# Patient Record
Sex: FEMALE | Race: WHITE | NOT HISPANIC OR LATINO | Employment: OTHER | URBAN - METROPOLITAN AREA
[De-identification: names, ages, dates, MRNs, and addresses within clinical notes are randomized per-mention and may not be internally consistent; named-entity substitution may affect disease eponyms.]

---

## 2017-01-11 PROCEDURE — 96523 IRRIG DRUG DELIVERY DEVICE: CPT

## 2017-01-11 PROCEDURE — 25000125 ZZHC RX 250: Performed by: INTERNAL MEDICINE

## 2017-01-11 RX ORDER — HEPARIN SODIUM (PORCINE) LOCK FLUSH IV SOLN 100 UNIT/ML 100 UNIT/ML
5 SOLUTION INTRAVENOUS EVERY 8 HOURS
Status: DISCONTINUED | OUTPATIENT
Start: 2017-01-11 | End: 2017-01-19 | Stop reason: HOSPADM

## 2017-01-11 RX ADMIN — SODIUM CHLORIDE, PRESERVATIVE FREE 5 ML: 5 INJECTION INTRAVENOUS at 11:14

## 2017-03-20 PROCEDURE — 96523 IRRIG DRUG DELIVERY DEVICE: CPT

## 2017-03-20 PROCEDURE — 25000128 H RX IP 250 OP 636: Performed by: INTERNAL MEDICINE

## 2017-03-20 RX ORDER — HEPARIN SODIUM (PORCINE) LOCK FLUSH IV SOLN 100 UNIT/ML 100 UNIT/ML
5 SOLUTION INTRAVENOUS DAILY PRN
Status: DISCONTINUED | OUTPATIENT
Start: 2017-03-20 | End: 2017-03-28 | Stop reason: HOSPADM

## 2017-03-20 RX ADMIN — SODIUM CHLORIDE, PRESERVATIVE FREE 5 ML: 5 INJECTION INTRAVENOUS at 10:43

## 2017-03-20 NOTE — NURSING NOTE
Chief Complaint   Patient presents with     Port Flush     port accessed by RN, positive blood return line flushed with NS and heparin and de-accessed immediately.      Melissa Hanley

## 2017-03-26 ASSESSMENT — ENCOUNTER SYMPTOMS
ABDOMINAL PAIN: 0
HEARTBURN: 1
JAUNDICE: 0
RECTAL PAIN: 0
VOMITING: 0
NAUSEA: 0
ARTHRALGIAS: 1
MYALGIAS: 0
DYSURIA: 1
EYE WATERING: 1
BOWEL INCONTINENCE: 0
DIFFICULTY URINATING: 0
MUSCLE CRAMPS: 1
JOINT SWELLING: 0
FLANK PAIN: 0
STIFFNESS: 0
LEG PAIN: 1
BACK PAIN: 1
BLOOD IN STOOL: 0
HEMATURIA: 0
BLOATING: 1
CONSTIPATION: 0
EYE IRRITATION: 1
NECK PAIN: 0
RECTAL BLEEDING: 0
DIARRHEA: 1
MUSCLE WEAKNESS: 0

## 2017-03-27 DIAGNOSIS — C48.0 LIPOSARCOMA OF RETROPERITONEUM (H): ICD-10-CM

## 2017-03-27 RX ORDER — METHYLPREDNISOLONE 32 MG/1
32 TABLET ORAL DAILY
Qty: 2 TABLET | Refills: 3 | Status: SHIPPED | OUTPATIENT
Start: 2017-03-27 | End: 2018-06-20

## 2017-03-29 ENCOUNTER — ONCOLOGY VISIT (OUTPATIENT)
Dept: ONCOLOGY | Facility: CLINIC | Age: 62
End: 2017-03-29
Attending: INTERNAL MEDICINE
Payer: COMMERCIAL

## 2017-03-29 VITALS
BODY MASS INDEX: 26.23 KG/M2 | SYSTOLIC BLOOD PRESSURE: 111 MMHG | HEIGHT: 69 IN | RESPIRATION RATE: 18 BRPM | TEMPERATURE: 98.1 F | DIASTOLIC BLOOD PRESSURE: 75 MMHG | HEART RATE: 63 BPM | WEIGHT: 177.1 LBS | OXYGEN SATURATION: 98 %

## 2017-03-29 DIAGNOSIS — C48.0 LIPOSARCOMA OF RETROPERITONEUM (H): ICD-10-CM

## 2017-03-29 DIAGNOSIS — E03.9 HYPOTHYROIDISM, UNSPECIFIED TYPE: ICD-10-CM

## 2017-03-29 DIAGNOSIS — D68.61 ANTIPHOSPHOLIPID ANTIBODY SYNDROME (H): ICD-10-CM

## 2017-03-29 DIAGNOSIS — M51.26 DISPLACEMENT OF LUMBAR INTERVERTEBRAL DISC WITHOUT MYELOPATHY: ICD-10-CM

## 2017-03-29 DIAGNOSIS — Z86.19 HISTORY OF HERPES GENITALIS: ICD-10-CM

## 2017-03-29 DIAGNOSIS — K21.9 GASTROESOPHAGEAL REFLUX DISEASE, ESOPHAGITIS PRESENCE NOT SPECIFIED: ICD-10-CM

## 2017-03-29 PROCEDURE — 99214 OFFICE O/P EST MOD 30 MIN: CPT | Mod: ZP | Performed by: INTERNAL MEDICINE

## 2017-03-29 PROCEDURE — 99212 OFFICE O/P EST SF 10 MIN: CPT | Mod: ZF

## 2017-03-29 RX ORDER — ASPIRIN 81 MG/1
81 TABLET, CHEWABLE ORAL DAILY
COMMUNITY
End: 2018-11-29

## 2017-03-29 RX ORDER — LEVOTHYROXINE SODIUM 75 UG/1
88 TABLET ORAL DAILY
COMMUNITY
End: 2018-04-20

## 2017-03-29 ASSESSMENT — PAIN SCALES - GENERAL: PAINLEVEL: NO PAIN (0)

## 2017-03-29 NOTE — MR AVS SNAPSHOT
After Visit Summary   3/29/2017    Shiela Hewitt    MRN: [id number]           Patient Information     Date Of Birth          1955        Visit Information        Provider Department      3/29/2017 11:00 AM Rob Wiseman MD [facility]        Today's Diagnoses     Liposarcoma of retroperitoneum (H)        Hypothyroidism, unspecified type        Antiphospholipid antibody syndrome (H)        Gastroesophageal reflux disease, esophagitis presence not specified        History of herpes genitalis        Displacement of lumbar intervertebral disc without myelopathy           Follow-ups after your visit        Your next 10 appointments already scheduled     Jun 27, 2017  9:40 AM CDT   (Arrive by 9:25 AM)   CT CHEST ABDOMEN PELVIS W/O & W CONTRAST with UCCT1   [facility] [place] [facility] ([facility])    [address] [address] [phone]           Please bring any scans or X-rays taken at other hospitals, if similar tests were done. Also bring a list of your medicines, including vitamins, minerals and over-the-counter drugs. It is safest to leave personal items at home.  Be sure to tell your doctor:   If you have any allergies.   If there s any chance you are pregnant.   If you are breastfeeding.   If you have any special needs.  You may have contrast for this exam. To prepare:   Do not eat or drink for 2 hours before your exam. If you need to take medicine, you may take it with small sips of water. (We may ask you to take liquid medicine as well.)   The day before your exam, drink extra fluids at least six 8-ounce glasses (unless your doctor tells you to restrict your fluids).  Patients over 70 or patients with diabetes or kidney problems:   If you haven t had a blood test (creatinine test) within the last 30 days, go to your clinic or Diagnostic Imaging Department for this test.  If you have diabetes:   If your  kidney function is normal, continue taking your metformin (Avandamet, Glucophage, Glucovance, Metaglip) on the day of your exam.   If your kidney function is abnormal, wait 48 hours before restarting this medicine.  You will have oral contrast for this exam:   You will drink the contrast at home. Get this from your clinic or Diagnostic Imaging Department. Please follow the directions given.  Please wear loose clothing, such as a sweat suit or jogging clothes. Avoid snaps, zippers and other metal. We may ask you to undress and put on a hospital gown.  If you have any questions, please call the Imaging Department where you will have your exam.            Jun 28, 2017 11:30 AM CDT   (Arrive by 11:15 AM)   Return Visit with Rob Wiseman MD   [facility] ([facility])    [address] [place] [id number] [phone]            Oct 18, 2017 11:30 AM CDT   (Arrive by 11:15 AM)   Return Visit with Rob Wiseman MD   [facility] ([facility])    [address] [place] [id number] [phone]              Future tests that were ordered for you today     Open Future Orders        Priority Expected Expires Ordered    CT Chest Abdomen Pelvis w/o & w Contrast Routine 6/27/2017 6/27/2022 3/29/2017            Who to contact     If you have questions or need follow up information about today's clinic visit or your schedule please contact [facility] directly at [phone].  Normal or non-critical lab and imaging results will be communicated to you by MyChart, letter or phone within 4 business days after the clinic has received the results. If you do not hear from us within 7 days, please contact the clinic through MyChart or phone. If you have a critical or abnormal lab result, we will notify you by phone as soon as possible.  Submit refill requests  "through [employer] or call your pharmacy and they will forward the refill request to us. Please allow 3 business days for your refill to be completed.          Additional Information About Your Visit        [employer]art Information     [employer] gives you secure access to your electronic health record. If you see a primary care provider, you can also send messages to your care team and make appointments. If you have questions, please call your primary care clinic.  If you do not have a primary care provider, please call [phone] and they will assist you.        Care EveryWhere ID     This is your Care EveryWhere ID. This could be used by other organizations to access your [place] medical records  [id number]        Your Vitals Were     Pulse Temperature Respirations Height Pulse Oximetry BMI (Body Mass Index)    63 98.1  F (36.7  C) (Oral) 18 1.74 m (5' 8.5\") 98% 26.54 kg/m2       Blood Pressure from Last 3 Encounters:   03/29/17 111/75   12/14/16 115/72   08/30/16 114/74    Weight from Last 3 Encounters:   03/29/17 80.3 kg (177 lb 1.6 oz)   12/14/16 81.9 kg (180 lb 9.6 oz)   08/30/16 80.8 kg (178 lb 3.2 oz)                 Today's Medication Changes          These changes are accurate as of: 3/29/17 11:28 AM.  If you have any questions, ask your nurse or doctor.               These medicines have changed or have updated prescriptions.        Dose/Directions    aspirin 81 MG chewable tablet   This may have changed:  Another medication with the same name was removed. Continue taking this medication, and follow the directions you see here.   Used for:  Liposarcoma of retroperitoneum (H), Hypothyroidism, unspecified type, Antiphospholipid antibody syndrome (H), Gastroesophageal reflux disease, esophagitis presence not specified, History of herpes genitalis, Displacement of lumbar intervertebral disc without myelopathy   Changed by:  Rob Wiseman MD        Dose:  81 mg   Take 81 mg by mouth   Refills:  0       " levothyroxine 75 MCG tablet   Commonly known as:  SYNTHROID/LEVOTHROID   This may have changed:  Another medication with the same name was removed. Continue taking this medication, and follow the directions you see here.   Used for:  Liposarcoma of retroperitoneum (H), Hypothyroidism, unspecified type, Antiphospholipid antibody syndrome (H), Gastroesophageal reflux disease, esophagitis presence not specified, History of herpes genitalis, Displacement of lumbar intervertebral disc without myelopathy   Changed by:  Rob Wiseman MD        Dose:  75 mcg   Take 75 mcg by mouth   Refills:  0                Primary Care Provider Office Phone # Fax #    Nahomy Desouza [phone]       [facility] [id number]  [facility] [address]        Thank you!     Thank you for choosing [facility]  for your care. Our goal is always to provide you with excellent care. Hearing back from our patients is one way we can continue to improve our services. Please take a few minutes to complete the written survey that you may receive in the mail after your visit with us. Thank you!             Your Updated Medication List - Protect others around you: Learn how to safely use, store and throw away your medicines at www.disposemymeds.org.          This list is accurate as of: 3/29/17 11:28 AM.  Always use your most recent med list.                   Brand Name Dispense Instructions for use    aspirin 81 MG chewable tablet      Take 81 mg by mouth       azithromycin 250 MG tablet    ZITHROMAX     Take by mouth three times a week       levothyroxine 75 MCG tablet    SYNTHROID/LEVOTHROID     Take 75 mcg by mouth       loperamide 2 MG capsule    IMODIUM     Take 2 mg by mouth daily       LORazepam 0.5 MG tablet    ATIVAN    30 tablet    Take 1 tablet (0.5 mg) by mouth every 4 hours as needed (Anxiety, Nausea/Vomiting or Sleep)       methylPREDNISolone 32 MG tablet    MEDROL    2  tablet    Take 1 tablet (32 mg) by mouth daily Take 1 tablet 12 hours before CT scan, take the other tablet 2 hours before CT scan       MULTIVITAMIN GUMMIES ADULT PO      Take 2 chew tab by mouth daily       omeprazole 40 MG capsule    priLOSEC    30 capsule    Take 1 capsule (40 mg) by mouth daily

## 2017-03-29 NOTE — LETTER
3/29/2017     RE: Shiela Hewitt  [address] [place] [address]     Dear Colleague,    Thank you for referring your patient, Shiela Hewitt, to the [facility]. Please see a copy of my visit note below.    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   -  Background  In brief, she noted an abdominal mass at the end of 2013, which was quite large and was resected on 01/28/2014. This tumor was about 26 cm in greatest diameter, high-grade, with necrosis present. In retrospect, she had had a CT scan done about 2 years before that, which was felt to be negative. In 08/2014, repeat imaging revealed a 3.5 cm mass near the psoas, and she received preoperative radiation therapy for this. 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 A new margin was obtained, but this was still positive for liposarcoma. All visible tumor was removed.     A recurrence was noted in the abd and she began lipodox/ifos about 3-19-15 and had 6 cycles; the last in August 2015.  -  -     Interval history    She is doing pretty well overall.  She just got back from [place] and did lot of golfing.    Her R upper leg bothers her sometimes-no change.  She can walk for 20 min before she needs to rest due to discomfort in the legs and bottoms of the feet.  The paresthesias with pins-and-needles sensation in the right groin and upper thigh are not much changed.       She developed facial and neck redness and some burning with CT contrast yesterday at 10 AM and she did take MP and benadryl - the same thing happened last time.    She is off prilosec now and GERD is generally OK.     She sometimes needs ativan at hs for sx c/w restless legs but is using it less.     BMs are pretty good not w improvement of chronic diarrhea w 1 Imodium a day.      She has a longstanding history of hypothyroidism on replacement T4, and some  "seasonal allergies. She was also noted to have an antiphospholipid antibody. This was checked because her sister presented with a mesenteric artery clot. The patient herself was on 81 mg of aspirin a day in this regard, and has not had any problems.      Otherwise, her 10-point ROS is notable for chronic hearing loss in the right ear, intermittent positional vertigo which she treats with Meclizine when presen, intermittent GERD, a history of genital herpes, and an L4-L5 disk herniation in 1997.      -  -  Background PMH, FH, SH  PAST MEDICAL HISTORY: She did have a tonsillectomy at age 15 and some sinus surgery in about 1994 along with a tubal pregnancy and a tubal ligation.   ALLERGIES: She does describe 1 hive after IV contrast material about 20 years ago, so she has been taking prednisone and Benadryl for contrast scans. She does have some itching with Dilaudid, but still uses it.   SOCIAL HISTORY: She is a never smoker and does not abuse alcohol or other drugs. 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  -        On exam she appeared comfortable with normal affect.   /75  Pulse 63  Temp 98.1  F (36.7  C) (Oral)  Resp 18  Ht 1.74 m (5' 8.5\")  Wt 80.3 kg (177 lb 1.6 oz)  SpO2 98%  BMI 26.54 kg/m2     HEENT No icterus.   NECK No thyromegaly.  CHEST clear chest.   CV RRR w no sig murmur.  ABD No HSMT.  LYMPH No lymphadenopathy.  NEURO Normal Romberg, heel/toe.  EXT No edema.    PSYCH Mood good.     -  No labs.     -     It was felt that on her outside imaging pre chemo that there seemed to be a recurrence in the abd; this is a complicated image. This region on the psoas seemed a bit more prominent on 3-14 c/w Feb, and on the 3-31 is less prominent. We dont have a biopsy of this but the series was c/w tumor response. The CT of ay 2015 showed some colitis.    I reviewed her new CT images and report and there is no clear PD and that is the formal " read.  -        We discussed a number of issues. She had the facial redness with the last scan again (typically the next day) but it did not bother her much so we will not change anything in that regard next time. She will MP pre scan again.    It will be 2 years post chemotherapy in August.  We will plan to restage in March.      All questions were addressed and she will call if other questions arise.  -  Rob Wiseman M.D.  Professor  Hematology, Oncology and Transplantation  -  cc: Sergio Haider MD   [facility]   Department of Surgery

## 2017-03-29 NOTE — NURSING NOTE
"Shiela Hewitt is a 61 year old female who presents for:  Chief Complaint   Patient presents with     Oncology Clinic Visit     return patient visit for follow up related to Liposarcoma of retroperitoneum (H)        Initial Vitals:  /75  Pulse 63  Temp 98.1  F (36.7  C) (Oral)  Resp 18  Ht 1.74 m (5' 8.5\")  Wt 80.3 kg (177 lb 1.6 oz)  SpO2 98%  BMI 26.54 kg/m2 Estimated body mass index is 26.54 kg/(m^2) as calculated from the following:    Height as of this encounter: 1.74 m (5' 8.5\").    Weight as of this encounter: 80.3 kg (177 lb 1.6 oz).. Body surface area is 1.97 meters squared. BP completed using cuff size: large  No Pain (0) No LMP recorded. Patient is postmenopausal. Allergies and medications reviewed.     Medications: Medication refills not needed today.  Pharmacy name entered into [employer]:    [id number] [place] - [address] [place] - [place], [place] - [address]  [facility] [place], [place] - [phone]    Comments: patient denied pain/discomfort.     5 minutes for nursing intake (face to face time)   Craig Quezada CMA        "

## 2017-03-29 NOTE — PROGRESS NOTES
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   -  Background  In brief, she noted an abdominal mass at the end of 2013, which was quite large and was resected on 01/28/2014. This tumor was about 26 cm in greatest diameter, high-grade, with necrosis present. In retrospect, she had had a CT scan done about 2 years before that, which was felt to be negative. In 08/2014, repeat imaging revealed a 3.5 cm mass near the psoas, and she received preoperative radiation therapy for this. 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 A new margin was obtained, but this was still positive for liposarcoma. All visible tumor was removed.     A recurrence was noted in the abd and she began lipodox/ifos about 3-19-15 and had 6 cycles; the last in August 2015.  -  -     Interval history    She is doing pretty well overall.  She just got back from [place] and did lot of golfing.    Her R upper leg bothers her sometimes-no change.  She can walk for 20 min before she needs to rest due to discomfort in the legs and bottoms of the feet.  The paresthesias with pins-and-needles sensation in the right groin and upper thigh are not much changed.       She developed facial and neck redness and some burning with CT contrast yesterday at 10 AM and she did take MP and benadryl - the same thing happened last time.    She is off prilosec now and GERD is generally OK.     She sometimes needs ativan at hs for sx c/w restless legs but is using it less.     BMs are pretty good not w improvement of chronic diarrhea w 1 Imodium a day.      She has a longstanding history of hypothyroidism on replacement T4, and some seasonal allergies. She was also noted to have an antiphospholipid antibody. This was checked because her sister presented with a mesenteric artery clot. The patient herself was on 81 mg of aspirin a day in this regard, and has not had  "any problems.      Otherwise, her 10-point ROS is notable for chronic hearing loss in the right ear, intermittent positional vertigo which she treats with Meclizine when presen, intermittent GERD, a history of genital herpes, and an L4-L5 disk herniation in 1997.      -  -  Background PMH, FH, SH  PAST MEDICAL HISTORY: She did have a tonsillectomy at age 15 and some sinus surgery in about 1994 along with a tubal pregnancy and a tubal ligation.   ALLERGIES: She does describe 1 hive after IV contrast material about 20 years ago, so she has been taking prednisone and Benadryl for contrast scans. She does have some itching with Dilaudid, but still uses it.   SOCIAL HISTORY: She is a never smoker and does not abuse alcohol or other drugs. 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  -        On exam she appeared comfortable with normal affect.   /75  Pulse 63  Temp 98.1  F (36.7  C) (Oral)  Resp 18  Ht 1.74 m (5' 8.5\")  Wt 80.3 kg (177 lb 1.6 oz)  SpO2 98%  BMI 26.54 kg/m2     HEENT No icterus.   NECK No thyromegaly.  CHEST clear chest.   CV RRR w no sig murmur.  ABD No HSMT.  LYMPH No lymphadenopathy.  NEURO Normal Romberg, heel/toe.  EXT No edema.    PSYCH Mood good.     -  No labs.     -     It was felt that on her outside imaging pre chemo that there seemed to be a recurrence in the abd; this is a complicated image. This region on the psoas seemed a bit more prominent on 3-14 c/w Feb, and on the 3-31 is less prominent. We dont have a biopsy of this but the series was c/w tumor response. The CT of ay 2015 showed some colitis.    I reviewed her new CT images and report and there is no clear PD and that is the formal read.  -        We discussed a number of issues. She had the facial redness with the last scan again (typically the next day) but it did not bother her much so we will not change anything in that regard next time. She will MP pre scan again.    It " will be 2 years post chemotherapy in August.  We will plan to restage in March.      All questions were addressed and she will call if other questions arise.  -  Rob Wiseman M.D.  Professor  Hematology, Oncology and Transplantation  -  cc: Sergio Haider MD   [facility]   Department of Surgery

## 2017-05-23 ENCOUNTER — TRANSFERRED RECORDS (OUTPATIENT)
Dept: HEALTH INFORMATION MANAGEMENT | Facility: CLINIC | Age: 62
End: 2017-05-23

## 2017-05-23 LAB — PAP SMEAR - HIM PATIENT REPORTED: NEGATIVE

## 2017-06-05 PROCEDURE — 96523 IRRIG DRUG DELIVERY DEVICE: CPT

## 2017-06-05 PROCEDURE — 25000128 H RX IP 250 OP 636: Performed by: INTERNAL MEDICINE

## 2017-06-05 RX ORDER — HEPARIN SODIUM (PORCINE) LOCK FLUSH IV SOLN 100 UNIT/ML 100 UNIT/ML
5 SOLUTION INTRAVENOUS DAILY PRN
Status: DISCONTINUED | OUTPATIENT
Start: 2017-06-05 | End: 2017-06-13 | Stop reason: HOSPADM

## 2017-06-05 RX ADMIN — SODIUM CHLORIDE, PRESERVATIVE FREE 5 ML: 5 INJECTION INTRAVENOUS at 10:20

## 2017-06-15 ASSESSMENT — ENCOUNTER SYMPTOMS
NUMBNESS: 1
EYE IRRITATION: 1
DIARRHEA: 1
EYE WATERING: 1

## 2017-06-23 LAB — HEP C HIM: NORMAL

## 2017-06-24 ENCOUNTER — HEALTH MAINTENANCE LETTER (OUTPATIENT)
Age: 62
End: 2017-06-24

## 2017-06-28 ENCOUNTER — ONCOLOGY VISIT (OUTPATIENT)
Dept: ONCOLOGY | Facility: CLINIC | Age: 62
End: 2017-06-28
Attending: INTERNAL MEDICINE
Payer: COMMERCIAL

## 2017-06-28 VITALS
TEMPERATURE: 98.8 F | RESPIRATION RATE: 18 BRPM | HEART RATE: 75 BPM | DIASTOLIC BLOOD PRESSURE: 71 MMHG | OXYGEN SATURATION: 97 % | WEIGHT: 176.15 LBS | SYSTOLIC BLOOD PRESSURE: 128 MMHG | BODY MASS INDEX: 26.39 KG/M2

## 2017-06-28 DIAGNOSIS — K21.9 GASTROESOPHAGEAL REFLUX DISEASE, ESOPHAGITIS PRESENCE NOT SPECIFIED: ICD-10-CM

## 2017-06-28 DIAGNOSIS — D68.61 ANTIPHOSPHOLIPID ANTIBODY SYNDROME (H): ICD-10-CM

## 2017-06-28 DIAGNOSIS — Z91.041 HISTORY OF ALLERGY TO RADIOGRAPHIC CONTRAST MEDIA: ICD-10-CM

## 2017-06-28 DIAGNOSIS — E03.9 HYPOTHYROIDISM, UNSPECIFIED TYPE: ICD-10-CM

## 2017-06-28 DIAGNOSIS — Z86.19 HISTORY OF HERPES GENITALIS: Primary | ICD-10-CM

## 2017-06-28 DIAGNOSIS — C48.0 LIPOSARCOMA OF RETROPERITONEUM (H): ICD-10-CM

## 2017-06-28 DIAGNOSIS — C49.9 LIPOSARCOMA (H): ICD-10-CM

## 2017-06-28 PROCEDURE — 99214 OFFICE O/P EST MOD 30 MIN: CPT | Mod: ZP | Performed by: INTERNAL MEDICINE

## 2017-06-28 PROCEDURE — 99212 OFFICE O/P EST SF 10 MIN: CPT | Mod: ZF

## 2017-06-28 RX ORDER — LORAZEPAM 0.5 MG/1
0.5 TABLET ORAL EVERY 4 HOURS PRN
Qty: 30 TABLET | Refills: 3 | Status: SHIPPED | OUTPATIENT
Start: 2017-06-28 | End: 2018-06-20

## 2017-06-28 ASSESSMENT — PAIN SCALES - GENERAL: PAINLEVEL: NO PAIN (0)

## 2017-06-28 NOTE — NURSING NOTE
"Oncology Rooming Note    June 28, 2017 11:23 AM   Shiela Hewitt is a 61 year old female who presents for:    Chief Complaint   Patient presents with     Oncology Clinic Visit     CT results , Liposarcoma      Initial Vitals: /71 (BP Location: Left arm, Patient Position: Chair, Cuff Size: Adult Large)  Pulse 75  Temp 98.8  F (37.1  C) (Oral)  Resp 18  Wt 79.9 kg (176 lb 2.4 oz)  SpO2 97%  BMI 26.39 kg/m2 Estimated body mass index is 26.39 kg/(m^2) as calculated from the following:    Height as of 3/29/17: 1.74 m (5' 8.5\").    Weight as of this encounter: 79.9 kg (176 lb 2.4 oz). Body surface area is 1.97 meters squared.  No Pain (0) Comment: Data Unavailable   No LMP recorded. Patient is postmenopausal.  Allergies reviewed: Yes  Medications reviewed: Yes    Medications: Medication refills not needed today.  Pharmacy name entered into [employer]:    [id number] - [place], [place] - [address] [place] [address] [address] [facility] [place], [place] - [address] [address]  [facility] [place], [place] - [address] [place] [address] [phone]    Clinical concerns:    mitesh was notified.    7 minutes for nursing intake (face to face time)     Kari Ortega MA              "

## 2017-06-28 NOTE — MR AVS SNAPSHOT
After Visit Summary   6/28/2017    Shiela Hewitt    MRN: [id number]           Patient Information     Date Of Birth          1955        Visit Information        Provider Department      6/28/2017 11:30 AM Rob Wiseman MD [facility]        Today's Diagnoses     History of herpes genitalis    -  1    Liposarcoma of retroperitoneum (H)        Hypothyroidism, unspecified type        Antiphospholipid antibody syndrome (H)        Gastroesophageal reflux disease, esophagitis presence not specified        History of allergy to radiographic contrast media        Liposarcoma (H)           Follow-ups after your visit        Your next 10 appointments already scheduled     Oct 16, 2017 10:15 AM CDT   [facility] Draw with  [facility] DRAW   [facility] ([facility])    [address] [place] [address] [id number] [phone]            Oct 16, 2017 11:00 AM CDT   (Arrive by 10:45 AM)   CT CHEST ABDOMEN PELVIS W/O & W CONTRAST with UCCT1   [facility] CT ([facility])    [address] [place] [address] [id number] [phone]           Please bring any scans or X-rays taken at other hospitals, if similar tests were done. Also bring a list of your medicines, including vitamins, minerals and over-the-counter drugs. It is safest to leave personal items at home.  Be sure to tell your doctor:   If you have any allergies.   If there s any chance you are pregnant.   If you are breastfeeding.   If you have any special needs.  You may have contrast for this exam. To prepare:   Do not eat or drink for 2 hours before your exam. If you need to take medicine, you may take it with small sips of water. (We may ask you to take liquid medicine as well.)   The day before your exam, drink extra fluids at least six 8-ounce glasses (unless your doctor tells you to restrict your fluids).   Patients over 70 or patients with diabetes or kidney problems:   If you haven t had a blood test (creatinine test) within the last 30 days, go to your clinic or Diagnostic Imaging Department for this test.  If you have diabetes:   If your kidney function is normal, continue taking your metformin (Avandamet, Glucophage, Glucovance, Metaglip) on the day of your exam.   If your kidney function is abnormal, wait 48 hours before restarting this medicine.  You will have oral contrast for this exam:   You will drink the contrast at home. Get this from your clinic or Diagnostic Imaging Department. Please follow the directions given.  Please wear loose clothing, such as a sweat suit or jogging clothes. Avoid snaps, zippers and other metal. We may ask you to undress and put on a hospital gown.  If you have any questions, please call the Imaging Department where you will have your exam.            Oct 18, 2017 11:30 AM CDT   (Arrive by 11:15 AM)   Return Visit with Rob Wiseman MD   [facility] ([facility])    [address] [place] [id number] [phone]            Feb 22, 2018 10:30 AM CST   (Arrive by 10:15 AM)   Return Visit with Rob Wiseman MD   [facility] ([facility])    [address] [place] [id number] [phone]              Future tests that were ordered for you today     Open Future Orders        Priority Expected Expires Ordered    CT Chest Abdomen Pelvis w/o & w Contrast Routine 10/13/2017 12/26/2017 6/28/2017            Who to contact     If you have questions or need follow up information about today's clinic visit or your schedule please contact [facility] directly at [phone].  Normal or non-critical lab and imaging results will be communicated to you by MyChart, letter or phone within 4 business days after the clinic  has received the results. If you do not hear from us within 7 days, please contact the clinic through [employer] or phone. If you have a critical or abnormal lab result, we will notify you by phone as soon as possible.  Submit refill requests through [employer] or call your pharmacy and they will forward the refill request to us. Please allow 3 business days for your refill to be completed.          Additional Information About Your Visit        [employer] Information     [employer] gives you secure access to your electronic health record. If you see a primary care provider, you can also send messages to your care team and make appointments. If you have questions, please call your primary care clinic.  If you do not have a primary care provider, please call [phone] and they will assist you.        Care EveryWhere ID     This is your Care EveryWhere ID. This could be used by other organizations to access your [facility] medical records  [id number]        Your Vitals Were     Pulse Temperature Respirations Pulse Oximetry BMI (Body Mass Index)       75 98.8  F (37.1  C) (Oral) 18 97% 26.39 kg/m2        Blood Pressure from Last 3 Encounters:   06/28/17 128/71   03/29/17 111/75   12/14/16 115/72    Weight from Last 3 Encounters:   06/28/17 79.9 kg (176 lb 2.4 oz)   03/29/17 80.3 kg (177 lb 1.6 oz)   12/14/16 81.9 kg (180 lb 9.6 oz)              We Performed the Following     CBC with platelets differential     Comprehensive metabolic panel          Where to get your medicines      Some of these will need a paper prescription and others can be bought over the counter.  Ask your nurse if you have questions.     Bring a paper prescription for each of these medications     LORazepam 0.5 MG tablet          Primary Care Provider Office Phone # Fax #    Nahomy Deo [phone]       [facility] [id number]  [place] [address]        Equal Access to Services     GREG CARR AH: Zhanna urrutia  ivan Rea, waosvaldoda luqadaha, qaasuncionta kashawnda amena, seven tyler lucerocassandra hansnadeen lacharleycece siomara [place] [phone].    ATENCIÓN: Si habla piaañol, tiene a webb disposición servicios gratuitos de asistencia lingüística. Ovidio al [phone].    We comply with applicable federal civil rights laws and [place] laws. We do not discriminate on the basis of race, color, national origin, age, disability sex, sexual orientation or gender identity.            Thank you!     Thank you for choosing [facility]  for your care. Our goal is always to provide you with excellent care. Hearing back from our patients is one way we can continue to improve our services. Please take a few minutes to complete the written survey that you may receive in the mail after your visit with us. Thank you!             Your Updated Medication List - Protect others around you: Learn how to safely use, store and throw away your medicines at www.disposemymeds.org.          This list is accurate as of: 6/28/17 11:52 AM.  Always use your most recent med list.                   Brand Name Dispense Instructions for use Diagnosis    aspirin 81 MG chewable tablet      Take 81 mg by mouth    Liposarcoma of retroperitoneum (H), Hypothyroidism, unspecified type, Antiphospholipid antibody syndrome (H), Gastroesophageal reflux disease, esophagitis presence not specified, History of herpes genitalis, Displacement of lumbar intervertebral disc without myelopathy       azithromycin 250 MG tablet    ZITHROMAX     Take by mouth three times a week    Liposarcoma of retroperitoneum (H)       levothyroxine 75 MCG tablet    SYNTHROID/LEVOTHROID     Take 75 mcg by mouth    Liposarcoma of retroperitoneum (H), Hypothyroidism, unspecified type, Antiphospholipid antibody syndrome (H), Gastroesophageal reflux disease, esophagitis presence not specified, History of herpes genitalis, Displacement of lumbar intervertebral disc without myelopathy        loperamide 2 MG capsule    IMODIUM     Take 2 mg by mouth daily        LORazepam 0.5 MG tablet    ATIVAN    30 tablet    Take 1 tablet (0.5 mg) by mouth every 4 hours as needed (Anxiety, Nausea/Vomiting or Sleep)    Liposarcoma (H)       methylPREDNISolone 32 MG tablet    MEDROL    2 tablet    Take 1 tablet (32 mg) by mouth daily Take 1 tablet 12 hours before CT scan, take the other tablet 2 hours before CT scan    Liposarcoma of retroperitoneum (H)       MULTIVITAMIN GUMMIES ADULT PO      Take 2 chew tab by mouth daily        omeprazole 40 MG capsule    priLOSEC    30 capsule    Take 1 capsule (40 mg) by mouth daily    Gastroesophageal reflux disease without esophagitis

## 2017-06-28 NOTE — LETTER
6/28/2017       RE: Shiela Hewitt  [address] [place] [address]     Dear Colleague,    Thank you for referring your patient, Shiela Hewitt, to the [facility]. Please see a copy of my visit note below.    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   -  Background  In brief, she noted an abdominal mass at the end of 2013, which was quite large and was resected on 01/28/2014. This tumor was about 26 cm in greatest diameter, high-grade, with necrosis present. In retrospect, she had had a CT scan done about 2 years before that, which was felt to be negative. In 08/2014, repeat imaging revealed a 3.5 cm mass near the psoas, and she received preoperative radiation therapy for this. 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 A new margin was obtained, but this was still positive for liposarcoma. All visible tumor was removed.      A recurrence was noted in the abd and she began lipodox/ifos about 3-19-15 and had 6 cycles; the last in August 2015.  -        Interval history     She is doing pretty well overall.      GERD is generally OK off prilosec.    She developed facial and neck redness and some burning with CT contrast yesterday at 10 AM and she did take MP and benadryl - the same thing happened last time.    Her R upper leg bothers her sometimes-no change.  She can walk for a mile over about 20 min before she needs to rest due to discomfort in the legs and bottoms of the feet.  The paresthesias with pins-and-needles sensation in the right groin and upper thigh are not much changed.       She goes to [place] in Sept.    She sometimes needs ativan at  for sx c/w restless legs but is using it less.      BMs are pretty good not w improvement of chronic diarrhea w 1 Imodium a day.       She has a longstanding history of hypothyroidism on replacement T4, and some seasonal  allergies. She was also noted to have an antiphospholipid antibody. This was checked because her sister presented with a mesenteric artery clot. The patient herself was on 81 mg of aspirin a day in this regard, and has not had any problems.     Otherwise,basically her 10-point ROS is notable for chronic hearing loss in the right ear, intermittent positional vertigo which she treats with Meclizine when presen, intermittent GERD, a history of genital herpes, and an L4-L5 disk herniation in 1997.       -  -  Background PMH, FH, SH  PAST MEDICAL HISTORY: She did have a tonsillectomy at age 15 and some sinus surgery in about 1994 along with a tubal pregnancy and a tubal ligation.   ALLERGIES: She does describe 1 hive after IV contrast material about 20 years ago, so she has been taking prednisone and Benadryl for contrast scans. She does have some itching with Dilaudid, but still uses it.   SOCIAL HISTORY: She is a never smoker and does not abuse alcohol or other drugs. 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  -          On exam she appeared comfortable with normal affect.   /71 (BP Location: Left arm, Patient Position: Chair, Cuff Size: Adult Large)  Pulse 75  Temp 98.8  F (37.1  C) (Oral)  Resp 18  Wt 79.9 kg (176 lb 2.4 oz)  SpO2 97%  BMI 26.39 kg/m2  HEENT No icterus.   NECK No thyromegaly.  CHEST clear chest.   LYMPH No lymphadenopathy.  CV RRR w no sig murmur.  ABD No HSMT.  NEURO Normal Romberg, heel/toe.  EXT No edema.    PSYCH Mood good.      -  No labs.      -      It was felt that on her outside imaging pre chemo that there seemed to be a recurrence in the abd; this is a complicated image. This region on the psoas seemed a bit more prominent on 3-14 c/w Feb, and on the 3-31 is less prominent. We dont have a biopsy of this but the series was c/w tumor response. The CT of ay 2015 showed some colitis.       I reviewed her new CT images today and  report and there is no clear PD and that is the formal read.  -    We discussed a number of issues. She had the facial redness with the last scan again (typically the next day) but it did not bother her much so we will not change anything in that regard next time. She will MP pre scan again.    She has steroid for the next scan.    It will be 2 years post chemotherapy in August 2017.  We will plan to restage in Oct.       All questions were addressed and she will call if other questions arise.  -  Rob Wiseman M.D.  Professor  Hematology, Oncology and Transplantation  -  cc: Sergio Haider MD   [facility]   Department of Surgery

## 2017-06-28 NOTE — PROGRESS NOTES
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   -  Background  In brief, she noted an abdominal mass at the end of 2013, which was quite large and was resected on 01/28/2014. This tumor was about 26 cm in greatest diameter, high-grade, with necrosis present. In retrospect, she had had a CT scan done about 2 years before that, which was felt to be negative. In 08/2014, repeat imaging revealed a 3.5 cm mass near the psoas, and she received preoperative radiation therapy for this. 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 A new margin was obtained, but this was still positive for liposarcoma. All visible tumor was removed.      A recurrence was noted in the abd and she began lipodox/ifos about 3-19-15 and had 6 cycles; the last in August 2015.  -        Interval history     She is doing pretty well overall.      GERD is generally OK off prilosec.    She developed facial and neck redness and some burning with CT contrast yesterday at 10 AM and she did take MP and benadryl - the same thing happened last time.    Her R upper leg bothers her sometimes-no change.  She can walk for a mile over about 20 min before she needs to rest due to discomfort in the legs and bottoms of the feet.  The paresthesias with pins-and-needles sensation in the right groin and upper thigh are not much changed.       She goes to [place] in Sept.    She sometimes needs ativan at hs for sx c/w restless legs but is using it less.      BMs are pretty good not w improvement of chronic diarrhea w 1 Imodium a day.       She has a longstanding history of hypothyroidism on replacement T4, and some seasonal allergies. She was also noted to have an antiphospholipid antibody. This was checked because her sister presented with a mesenteric artery clot. The patient herself was on 81 mg of aspirin a day in this regard, and has not had any problems.      Otherwise,basically her 10-point ROS is notable for chronic hearing loss in the right ear, intermittent positional vertigo which she treats with Meclizine when presen, intermittent GERD, a history of genital herpes, and an L4-L5 disk herniation in 1997.       -  -  Background PMH, FH, SH  PAST MEDICAL HISTORY: She did have a tonsillectomy at age 15 and some sinus surgery in about 1994 along with a tubal pregnancy and a tubal ligation.   ALLERGIES: She does describe 1 hive after IV contrast material about 20 years ago, so she has been taking prednisone and Benadryl for contrast scans. She does have some itching with Dilaudid, but still uses it.   SOCIAL HISTORY: She is a never smoker and does not abuse alcohol or other drugs. 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  -          On exam she appeared comfortable with normal affect.   /71 (BP Location: Left arm, Patient Position: Chair, Cuff Size: Adult Large)  Pulse 75  Temp 98.8  F (37.1  C) (Oral)  Resp 18  Wt 79.9 kg (176 lb 2.4 oz)  SpO2 97%  BMI 26.39 kg/m2  HEENT No icterus.   NECK No thyromegaly.  CHEST clear chest.   LYMPH No lymphadenopathy.  CV RRR w no sig murmur.  ABD No HSMT.  NEURO Normal Romberg, heel/toe.  EXT No edema.    PSYCH Mood good.      -  No labs.      -      It was felt that on her outside imaging pre chemo that there seemed to be a recurrence in the abd; this is a complicated image. This region on the psoas seemed a bit more prominent on 3-14 c/w Feb, and on the 3-31 is less prominent. We dont have a biopsy of this but the series was c/w tumor response. The CT of ay 2015 showed some colitis.       I reviewed her new CT images today and report and there is no clear PD and that is the formal read.  -    We discussed a number of issues. She had the facial redness with the last scan again (typically the next day) but it did not bother her much so we will not change anything in  that regard next time. She will MP pre scan again.    She has steroid for the next scan.    It will be 2 years post chemotherapy in August 2017.  We will plan to restage in Oct.       All questions were addressed and she will call if other questions arise.  -  Rob Wiseman M.D.  Professor  Hematology, Oncology and Transplantation  -  cc: Sergio Haider MD   [facility]   Department of Surgery

## 2017-08-04 PROCEDURE — 36591 DRAW BLOOD OFF VENOUS DEVICE: CPT

## 2017-08-04 PROCEDURE — 25000128 H RX IP 250 OP 636: Performed by: INTERNAL MEDICINE

## 2017-08-04 RX ORDER — HEPARIN SODIUM (PORCINE) LOCK FLUSH IV SOLN 100 UNIT/ML 100 UNIT/ML
5 SOLUTION INTRAVENOUS EVERY 8 HOURS
Status: DISCONTINUED | OUTPATIENT
Start: 2017-08-04 | End: 2017-08-12 | Stop reason: HOSPADM

## 2017-08-04 RX ADMIN — SODIUM CHLORIDE, PRESERVATIVE FREE 5 ML: 5 INJECTION INTRAVENOUS at 10:20

## 2017-10-06 PROCEDURE — 25000128 H RX IP 250 OP 636: Performed by: INTERNAL MEDICINE

## 2017-10-06 PROCEDURE — 96523 IRRIG DRUG DELIVERY DEVICE: CPT

## 2017-10-06 RX ORDER — HEPARIN SODIUM (PORCINE) LOCK FLUSH IV SOLN 100 UNIT/ML 100 UNIT/ML
5 SOLUTION INTRAVENOUS EVERY 8 HOURS
Status: DISCONTINUED | OUTPATIENT
Start: 2017-10-06 | End: 2017-10-14 | Stop reason: HOSPADM

## 2017-10-06 RX ADMIN — SODIUM CHLORIDE, PRESERVATIVE FREE 5 ML: 5 INJECTION INTRAVENOUS at 09:57

## 2017-10-06 NOTE — NURSING NOTE
Chief Complaint   Patient presents with     Port Flush     accessed with Gripper needle, flushed with heparin and de accessed

## 2017-10-16 ENCOUNTER — OFFICE VISIT (OUTPATIENT)
Dept: FAMILY MEDICINE | Facility: CLINIC | Age: 62
End: 2017-10-16
Payer: COMMERCIAL

## 2017-10-16 VITALS
TEMPERATURE: 98.3 F | WEIGHT: 177 LBS | BODY MASS INDEX: 26.52 KG/M2 | OXYGEN SATURATION: 98 % | DIASTOLIC BLOOD PRESSURE: 69 MMHG | SYSTOLIC BLOOD PRESSURE: 114 MMHG | HEART RATE: 88 BPM

## 2017-10-16 DIAGNOSIS — C48.0 LIPOSARCOMA OF RETROPERITONEUM (H): ICD-10-CM

## 2017-10-16 DIAGNOSIS — D68.61 ANTIPHOSPHOLIPID ANTIBODY SYNDROME (H): ICD-10-CM

## 2017-10-16 DIAGNOSIS — F41.1 GAD (GENERALIZED ANXIETY DISORDER): ICD-10-CM

## 2017-10-16 DIAGNOSIS — D63.0 ANEMIA IN NEOPLASTIC DISEASE: ICD-10-CM

## 2017-10-16 DIAGNOSIS — H81.10 BENIGN PAROXYSMAL POSITIONAL VERTIGO, UNSPECIFIED LATERALITY: ICD-10-CM

## 2017-10-16 DIAGNOSIS — K21.9 GASTROESOPHAGEAL REFLUX DISEASE, ESOPHAGITIS PRESENCE NOT SPECIFIED: ICD-10-CM

## 2017-10-16 DIAGNOSIS — R79.89 LOW VITAMIN D LEVEL: ICD-10-CM

## 2017-10-16 DIAGNOSIS — Z86.19 HISTORY OF HERPES GENITALIS: ICD-10-CM

## 2017-10-16 DIAGNOSIS — E03.9 HYPOTHYROIDISM, UNSPECIFIED TYPE: ICD-10-CM

## 2017-10-16 DIAGNOSIS — K56.609 SBO (SMALL BOWEL OBSTRUCTION) (H): Primary | ICD-10-CM

## 2017-10-16 DIAGNOSIS — R73.09 ELEVATED GLUCOSE: ICD-10-CM

## 2017-10-16 DIAGNOSIS — G47.00 INSOMNIA, UNSPECIFIED TYPE: ICD-10-CM

## 2017-10-16 DIAGNOSIS — C49.9 LIPOSARCOMA (H): ICD-10-CM

## 2017-10-16 LAB
ALBUMIN SERPL-MCNC: 3.8 G/DL (ref 3.4–5)
ALP SERPL-CCNC: 90 U/L (ref 40–150)
ALT SERPL W P-5'-P-CCNC: 29 U/L (ref 0–50)
ANION GAP SERPL CALCULATED.3IONS-SCNC: 9 MMOL/L (ref 3–14)
AST SERPL W P-5'-P-CCNC: 25 U/L (ref 0–45)
BASOPHILS # BLD AUTO: 0 10E9/L (ref 0–0.2)
BASOPHILS NFR BLD AUTO: 0 %
BILIRUB SERPL-MCNC: 0.7 MG/DL (ref 0.2–1.3)
BUN SERPL-MCNC: 20 MG/DL (ref 7–30)
CALCIUM SERPL-MCNC: 9.1 MG/DL (ref 8.5–10.1)
CHLORIDE SERPL-SCNC: 108 MMOL/L (ref 94–109)
CO2 SERPL-SCNC: 23 MMOL/L (ref 20–32)
CREAT SERPL-MCNC: 0.86 MG/DL (ref 0.52–1.04)
DIFFERENTIAL METHOD BLD: ABNORMAL
EOSINOPHIL # BLD AUTO: 0 10E9/L (ref 0–0.7)
EOSINOPHIL NFR BLD AUTO: 0 %
ERYTHROCYTE [DISTWIDTH] IN BLOOD BY AUTOMATED COUNT: 13.2 % (ref 10–15)
GFR SERPL CREATININE-BSD FRML MDRD: 67 ML/MIN/1.7M2
GLUCOSE SERPL-MCNC: 128 MG/DL (ref 70–99)
HBA1C MFR BLD: 5.3 % (ref 4.3–6)
HCT VFR BLD AUTO: 37.9 % (ref 35–47)
HGB BLD-MCNC: 12.6 G/DL (ref 11.7–15.7)
IMM GRANULOCYTES # BLD: 0 10E9/L (ref 0–0.4)
IMM GRANULOCYTES NFR BLD: 0.3 %
LYMPHOCYTES # BLD AUTO: 0.6 10E9/L (ref 0.8–5.3)
LYMPHOCYTES NFR BLD AUTO: 9.1 %
MCH RBC QN AUTO: 34.1 PG (ref 26.5–33)
MCHC RBC AUTO-ENTMCNC: 33.2 G/DL (ref 31.5–36.5)
MCV RBC AUTO: 102 FL (ref 78–100)
MONOCYTES # BLD AUTO: 0.1 10E9/L (ref 0–1.3)
MONOCYTES NFR BLD AUTO: 1.3 %
NEUTROPHILS # BLD AUTO: 5.5 10E9/L (ref 1.6–8.3)
NEUTROPHILS NFR BLD AUTO: 89.3 %
NRBC # BLD AUTO: 0 10*3/UL
NRBC BLD AUTO-RTO: 0 /100
PLATELET # BLD AUTO: 172 10E9/L (ref 150–450)
POTASSIUM SERPL-SCNC: 3.6 MMOL/L (ref 3.4–5.3)
PROT SERPL-MCNC: 7 G/DL (ref 6.8–8.8)
RBC # BLD AUTO: 3.7 10E12/L (ref 3.8–5.2)
SODIUM SERPL-SCNC: 139 MMOL/L (ref 133–144)
WBC # BLD AUTO: 6.2 10E9/L (ref 4–11)

## 2017-10-16 PROCEDURE — 82306 VITAMIN D 25 HYDROXY: CPT | Performed by: INTERNAL MEDICINE

## 2017-10-16 PROCEDURE — 83036 HEMOGLOBIN GLYCOSYLATED A1C: CPT | Performed by: INTERNAL MEDICINE

## 2017-10-16 PROCEDURE — 80053 COMPREHEN METABOLIC PANEL: CPT | Performed by: INTERNAL MEDICINE

## 2017-10-16 PROCEDURE — 85025 COMPLETE CBC W/AUTO DIFF WBC: CPT | Performed by: INTERNAL MEDICINE

## 2017-10-16 PROCEDURE — 99204 OFFICE O/P NEW MOD 45 MIN: CPT | Performed by: FAMILY MEDICINE

## 2017-10-16 PROCEDURE — 25000128 H RX IP 250 OP 636: Performed by: INTERNAL MEDICINE

## 2017-10-16 RX ORDER — HEPARIN SODIUM (PORCINE) LOCK FLUSH IV SOLN 100 UNIT/ML 100 UNIT/ML
5 SOLUTION INTRAVENOUS
Status: COMPLETED | OUTPATIENT
Start: 2017-10-16 | End: 2017-10-16

## 2017-10-16 RX ORDER — TRAZODONE HYDROCHLORIDE 50 MG/1
50 TABLET, FILM COATED ORAL
Qty: 30 TABLET | Refills: 1 | Status: SHIPPED | OUTPATIENT
Start: 2017-10-16 | End: 2018-11-29

## 2017-10-16 RX ORDER — LORAZEPAM 0.5 MG/1
0.5 TABLET ORAL EVERY 4 HOURS PRN
Qty: 30 TABLET | Refills: 3 | Status: CANCELLED | OUTPATIENT
Start: 2017-10-16

## 2017-10-16 RX ADMIN — SODIUM CHLORIDE, PRESERVATIVE FREE 5 ML: 5 INJECTION INTRAVENOUS at 10:26

## 2017-10-16 ASSESSMENT — ANXIETY QUESTIONNAIRES
GAD7 TOTAL SCORE: 10
2. NOT BEING ABLE TO STOP OR CONTROL WORRYING: MORE THAN HALF THE DAYS
7. FEELING AFRAID AS IF SOMETHING AWFUL MIGHT HAPPEN: SEVERAL DAYS
1. FEELING NERVOUS, ANXIOUS, OR ON EDGE: MORE THAN HALF THE DAYS
3. WORRYING TOO MUCH ABOUT DIFFERENT THINGS: MORE THAN HALF THE DAYS
5. BEING SO RESTLESS THAT IT IS HARD TO SIT STILL: SEVERAL DAYS
6. BECOMING EASILY ANNOYED OR IRRITABLE: NOT AT ALL

## 2017-10-16 ASSESSMENT — PATIENT HEALTH QUESTIONNAIRE - PHQ9: 5. POOR APPETITE OR OVEREATING: MORE THAN HALF THE DAYS

## 2017-10-16 NOTE — NURSING NOTE
"Chief Complaint   Patient presents with     Establish Care     Recheck Medication     Health Maintenance     pended     Flu Shot     historically documented     Formulary Issue     not covereing lorazapam, needs a PA       Initial /69  Pulse 88  Temp 98.3  F (36.8  C) (Oral)  Ht (P) 5' 8.5\" (1.74 m)  Wt 177 lb (80.3 kg)  SpO2 98%  BMI (P) 26.52 kg/m2 Estimated body mass index is 26.52 kg/(m^2) (pended) as calculated from the following:    Height as of this encounter: (P) 5' 8.5\" (1.74 m).    Weight as of this encounter: 177 lb (80.3 kg).  Medication Reconciliation: complete   Merlyn Montes MA      "

## 2017-10-16 NOTE — NURSING NOTE
"Chief Complaint   Patient presents with     Port Draw     labs drawn from port by rn.        Port accessed with 20 gauge 3/4\" Power needle and labs drawn by rn.  Port flushed with NS and heparin.  Pt tolerated well.    Otilia Montano, RN    Add:  Spoke with Dr. Wiseman who is placing orders for CBC and CMP.  "

## 2017-10-16 NOTE — PROGRESS NOTES
SUBJECTIVE:   Shiela Hewitt is a 62 year old female who presents to clinic today for the following health issues:     Patient had a recent small bowel obstruction in [place]. Brought records.   Brought records from recent blood work.   Refill of Ativan, not covered by new insurance. Needs a PA or alternative medication.     Anxiety Follow-Up    Status since last visit: No change, uses ativan when she goes to sleep and helps her stay asleep.     Other associated symptoms: None    Patient reports trouble sleeping disturbed by hot flashes and restless leg. She notes her son is an alcoholic and causes her stress.     Complicating factors:   Significant life event: No   Current substance abuse: None  Depression symptoms: No  RUTH-7 SCORE 10/16/2017   Total Score 10       GAD7        Amount of exercise or physical activity: 6-7 days/week for an average of 15-30 minutes, walking dog    Problems taking medications regularly: No    Medication side effects: none    Diet: regular (no restrictions)     Please abstract the following data from this visit with this patient into the appropriate field in Epic:    Mammogram done on this date: 5/22/17 (approximately), by this group: [facility], results were normal.   Pap smear done on this date: 5/23/17 (approximately), by this group: [facility], results were normal  Hepatitis C screening done by [facility] 5/23/17 and negative.      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  She notes she had half of her colon and small intestine surgically removed.  She takes imodium for diarrhea.  Patient is interested in seeing GI.  She states she gets dehydrated easily.  She reports muscle cramping in her legs.  Patient states her bowels are regular       Problem list and  "histories reviewed & adjusted, as indicated.  Additional history: as documented    BP Readings from Last 3 Encounters:   10/16/17 114/69   06/28/17 128/71   03/29/17 111/75    Wt Readings from Last 3 Encounters:   10/16/17 177 lb (80.3 kg)   06/28/17 176 lb 2.4 oz (79.9 kg)   03/29/17 177 lb 1.6 oz (80.3 kg)                    Reviewed and updated as needed this visit by clinical staff  Allergies  Meds       Reviewed and updated as needed this visit by Provider       ROS:  Constitutional, HEENT, cardiovascular, pulmonary, GI, , musculoskeletal, neuro, skin, and endocrine systems are negative, except as otherwise noted.  Psych: see above     This document serves as a record of the services and decisions personally performed and made by Mindy Vieira DO. It was created on her/his behalf by Raquel Gallegos, a trained medical scribe. The creation of this document is based on the provider's statements to the medical scribe.  Raquel Gallegos October 16, 2017 1:23 PM     OBJECTIVE:   /69  Pulse 88  Temp 98.3  F (36.8  C) (Oral)  Ht (P) 5' 8.5\" (1.74 m)  Wt 177 lb (80.3 kg)  SpO2 98%  BMI (P) 26.52 kg/m2  Body mass index is 26.52 kg/(m^2) (pended).  GENERAL: healthy, alert and no distress  EYES: Eyes grossly normal to inspection, PERRL and conjunctivae and sclerae normal  HENT: ear canals and TM's normal, nose and mouth without ulcers or lesions  NECK: no adenopathy, no asymmetry, masses, or scars and thyroid normal to palpation  RESP: lungs clear to auscultation - no rales, rhonchi or wheezes  CV: regular rate and rhythm, normal S1 S2, no S3 or S4, no murmur, click or rub, no peripheral edema and peripheral pulses strong  ABDOMEN: soft, no hepatosplenomegaly, no masses and bowel sounds normal, RLQ tender  MS: no gross musculoskeletal defects noted, no edema  SKIN: no suspicious lesions or rashes  NEURO: Normal strength and tone, mentation intact and speech normal  PSYCH: mentation appears normal, affect " normal/bright    Diagnostic Test Results:  Results for orders placed or performed in visit on 10/16/17 (from the past 24 hour(s))   CBC with platelets differential   Result Value Ref Range    WBC 6.2 4.0 - 11.0 10e9/L    RBC Count 3.70 (L) 3.8 - 5.2 10e12/L    Hemoglobin 12.6 11.7 - 15.7 g/dL    Hematocrit 37.9 35.0 - 47.0 %     (H) 78 - 100 fl    MCH 34.1 (H) 26.5 - 33.0 pg    MCHC 33.2 31.5 - 36.5 g/dL    RDW 13.2 10.0 - 15.0 %    Platelet Count 172 150 - 450 10e9/L    Diff Method Automated Method     % Neutrophils 89.3 %    % Lymphocytes 9.1 %    % Monocytes 1.3 %    % Eosinophils 0.0 %    % Basophils 0.0 %    % Immature Granulocytes 0.3 %    Nucleated RBCs 0 0 /100    Absolute Neutrophil 5.5 1.6 - 8.3 10e9/L    Absolute Lymphocytes 0.6 (L) 0.8 - 5.3 10e9/L    Absolute Monocytes 0.1 0.0 - 1.3 10e9/L    Absolute Eosinophils 0.0 0.0 - 0.7 10e9/L    Absolute Basophils 0.0 0.0 - 0.2 10e9/L    Abs Immature Granulocytes 0.0 0 - 0.4 10e9/L    Absolute Nucleated RBC 0.0    Comprehensive metabolic panel   Result Value Ref Range    Sodium 139 133 - 144 mmol/L    Potassium 3.6 3.4 - 5.3 mmol/L    Chloride 108 94 - 109 mmol/L    Carbon Dioxide 23 20 - 32 mmol/L    Anion Gap 9 3 - 14 mmol/L    Glucose 128 (H) 70 - 99 mg/dL    Urea Nitrogen 20 7 - 30 mg/dL    Creatinine 0.86 0.52 - 1.04 mg/dL    GFR Estimate 67 >60 mL/min/1.7m2    GFR Estimate If Black 81 >60 mL/min/1.7m2    Calcium 9.1 8.5 - 10.1 mg/dL    Bilirubin Total 0.7 0.2 - 1.3 mg/dL    Albumin 3.8 3.4 - 5.0 g/dL    Protein Total 7.0 6.8 - 8.8 g/dL    Alkaline Phosphatase 90 40 - 150 U/L    ALT 29 0 - 50 U/L    AST 25 0 - 45 U/L   Hemoglobin A1c   Result Value Ref Range    Hemoglobin A1C 5.3 4.3 - 6.0 %       ASSESSMENT/PLAN:     Hypothyroidism; controlled/euthyroid   Plan:  No changes in the patient's current treatment plan      ICD-10-CM    1. SBO (small bowel obstruction) K56.609 GASTROENTEROLOGY ADULT REF CONSULT ONLY   2. Insomnia, unspecified type G47.00  traZODone (DESYREL) 50 MG tablet   3. Liposarcoma (H) C49.9    4. Anemia in neoplastic disease D63.0    5. Hypothyroidism, unspecified type E03.9    6. Gastroesophageal reflux disease, esophagitis presence not specified K21.9    7. RUTH (generalized anxiety disorder) F41.1    8. Elevated glucose R73.09 CANCELED: Hemoglobin A1c   9. Low vitamin D level E55.9 CANCELED: Vitamin D Deficiency     I referred patient to GI for SBO follow up and prescribed Trazodone for insomnia.  Patient will follow-up with me on MyChart with an update on her insomnia. This patient's cancer and anemia is managed by his specialist and is stable on the current treatments.     Patient Instructions   Follow-up with me with an update on your insomnia.       This document serves as a record of the services and decisions personally performed and made by Mindy Vieira DO. It was created on her/his behalf by Raquel Gallegos, a trained medical scribe. The creation of this document is based on the provider's statements to the medical scribe.  Raquel Gallegos October 16, 2017 1:22 PM     Mindy Vieira DO  [facility]

## 2017-10-16 NOTE — MR AVS SNAPSHOT
After Visit Summary   10/16/2017    Shiela Hewitt    MRN: [id number]           Patient Information     Date Of Birth          1955        Visit Information        Provider Department      10/16/2017 1:00 PM Mindy Vieira DO [facility]        Today's Diagnoses     Liposarcoma (H)    -  1    Anemia in neoplastic disease        Hypothyroidism, unspecified type        Gastroesophageal reflux disease, esophagitis presence not specified        RUTH (generalized anxiety disorder)        SBO (small bowel obstruction)        Elevated glucose        Low vitamin D level        Insomnia, unspecified type          Care Instructions    Follow-up with me with an update on your insomnia.           Follow-ups after your visit        Additional Services     GASTROENTEROLOGY ADULT REF CONSULT ONLY       Preferred Location: [facility], [facility] [phone]      Please be aware that coverage of these services is subject to the terms and limitations of your health insurance plan.  Call member services at your health plan with any benefit or coverage questions.  Any procedures must be performed at a [facility] facility OR coordinated by your clinic's referral office.    Please bring the following with you to your appointment:    (1) Any X-Rays, CTs or MRIs which have been performed.  Contact the facility where they were done to arrange for  prior to your scheduled appointment.    (2) List of current medications   (3) This referral request   (4) Any documents/labs given to you for this referral                  Your next 10 appointments already scheduled     Oct 18, 2017 11:30 AM CDT   (Arrive by 11:15 AM)   Return Visit with Rob Wiseman MD   [facility] ([facility])    [address] [place] [id number]   [phone]            Feb 22, 2018 10:30 AM CST   (Arrive by 10:15 AM)   Return Visit with Rob Lechuga  MD [place]   [facility] ([facility])    [address]  [address]  [facility] [address] [phone]              Who to contact     If you have questions or need follow up information about today's clinic visit or your schedule please contact [facility] directly at [phone].  Normal or non-critical lab and imaging results will be communicated to you by MyChart, letter or phone within 4 business days after the clinic has received the results. If you do not hear from us within 7 days, please contact the clinic through [facility] or phone. If you have a critical or abnormal lab result, we will notify you by phone as soon as possible.  Submit refill requests through Consulted or call your pharmacy and they will forward the refill request to us. Please allow 3 business days for your refill to be completed.          Additional Information About Your Visit        [facility] Information     Consulted gives you secure access to your electronic health record. If you see a primary care provider, you can also send messages to your care team and make appointments. If you have questions, please call your primary care clinic.  If you do not have a primary care provider, please call [phone] and they will assist you.        Care EveryWhere ID     This is your Care EveryWhere ID. This could be used by other organizations to access your [facility] medical records  [id number]        Your Vitals Were     Pulse Temperature Pulse Oximetry BMI (Body Mass Index)          88 98.3  F (36.8  C) (Oral) 98% 26.52 kg/m2         Blood Pressure from Last 3 Encounters:   10/16/17 114/69   06/28/17 128/71   03/29/17 111/75    Weight from Last 3 Encounters:   10/16/17 177 lb (80.3 kg)   06/28/17 176 lb 2.4 oz (79.9 kg)   03/29/17 177 lb 1.6 oz (80.3 kg)              We Performed the Following     GASTROENTEROLOGY ADULT REF CONSULT ONLY     Hemoglobin A1c     Vitamin D  Deficiency          Today's Medication Changes          These changes are accurate as of: 10/16/17  2:04 PM.  If you have any questions, ask your nurse or doctor.               Start taking these medicines.        Dose/Directions    traZODone 50 MG tablet   Commonly known as:  DESYREL   Used for:  Insomnia, unspecified type   Started by:  Mindy Vieira DO        Dose:  50 mg   Take 1 tablet (50 mg) by mouth nightly as needed for sleep   Quantity:  30 tablet   Refills:  1         Stop taking these medicines if you haven't already. Please contact your care team if you have questions.     azithromycin 250 MG tablet   Commonly known as:  ZITHROMAX   Stopped by:  Mindy Vieira DO                Where to get your medicines      These medications were sent to [facility] [address] [place] [address]  AT [address] [facility] [address]     Phone:  [phone]     traZODone 50 MG tablet                Primary Care Provider Office Phone # Fax #    Nahomy Desouza [phone]       [id number]  [facility] [address]        Equal Access to Services     [facility] Center[facility]ISHA : Hadii aad ku hadasho Soomaali, waaxda luqadaha, qaybta kaalmada adeegyada, waxay ashain hayfamilian rohan armirez . So [facility] [phone].    ATENCIÓN: Si habla español, tiene a webb disposición servicios gratuitos de asistencia lingüística. LlThe [facility] [phone].    We comply with applicable federal civil rights laws and [place] laws. We do not discriminate on the basis of race, color, national origin, age, disability, sex, sexual orientation, or gender identity.            Thank you!     Thank you for choosing [facility]  for your care. Our goal is always to provide you with excellent care. Hearing back from our patients is one way we can continue to improve our services. Please take a few minutes to complete the written survey that you  may receive in the mail after your visit with us. Thank you!             Your Updated Medication List - Protect others around you: Learn how to safely use, store and throw away your medicines at www.disposemymeds.org.          This list is accurate as of: 10/16/17  2:04 PM.  Always use your most recent med list.                   Brand Name Dispense Instructions for use Diagnosis    aspirin 81 MG chewable tablet      Take 81 mg by mouth    Liposarcoma of retroperitoneum (H), Hypothyroidism, unspecified type, Antiphospholipid antibody syndrome (H), Gastroesophageal reflux disease, esophagitis presence not specified, History of herpes genitalis, Displacement of lumbar intervertebral disc without myelopathy       levothyroxine 75 MCG tablet    SYNTHROID/LEVOTHROID     Take 75 mcg by mouth    Liposarcoma of retroperitoneum (H), Hypothyroidism, unspecified type, Antiphospholipid antibody syndrome (H), Gastroesophageal reflux disease, esophagitis presence not specified, History of herpes genitalis, Displacement of lumbar intervertebral disc without myelopathy       loperamide 2 MG capsule    IMODIUM     Take 2 mg by mouth daily        LORazepam 0.5 MG tablet    ATIVAN    30 tablet    Take 1 tablet (0.5 mg) by mouth every 4 hours as needed (Anxiety, Nausea/Vomiting or Sleep)    Liposarcoma (H)       methylPREDNISolone 32 MG tablet    MEDROL    2 tablet    Take 1 tablet (32 mg) by mouth daily Take 1 tablet 12 hours before CT scan, take the other tablet 2 hours before CT scan    Liposarcoma of retroperitoneum (H)       MULTIVITAMIN GUMMIES ADULT PO      Take 2 chew tab by mouth daily        omeprazole 40 MG capsule    priLOSEC    30 capsule    Take 1 capsule (40 mg) by mouth daily    Gastroesophageal reflux disease without esophagitis       RESTASIS OP           traZODone 50 MG tablet    DESYREL    30 tablet    Take 1 tablet (50 mg) by mouth nightly as needed for sleep    Insomnia, unspecified type

## 2017-10-16 NOTE — Clinical Note
Mammogram done on this date: 5/22/17 (approximately), by this group: [facility] , results were normal.  Pap smear done on this date: 5/23/17 (approximately), by this group: [facility], results were normal Hepatitis C screening done by [facility] 5/23/17 and negative Dexa Scan done 11/9/2010 [facility] and Rebel Montes MA

## 2017-10-17 ASSESSMENT — ANXIETY QUESTIONNAIRES: GAD7 TOTAL SCORE: 10

## 2017-10-18 ENCOUNTER — ONCOLOGY VISIT (OUTPATIENT)
Dept: ONCOLOGY | Facility: CLINIC | Age: 62
End: 2017-10-18
Attending: INTERNAL MEDICINE
Payer: COMMERCIAL

## 2017-10-18 VITALS
TEMPERATURE: 97.5 F | OXYGEN SATURATION: 99 % | WEIGHT: 176.9 LBS | BODY MASS INDEX: 26.2 KG/M2 | DIASTOLIC BLOOD PRESSURE: 68 MMHG | HEIGHT: 69 IN | RESPIRATION RATE: 18 BRPM | HEART RATE: 73 BPM | SYSTOLIC BLOOD PRESSURE: 130 MMHG

## 2017-10-18 DIAGNOSIS — H81.10 BENIGN PAROXYSMAL POSITIONAL VERTIGO, UNSPECIFIED LATERALITY: ICD-10-CM

## 2017-10-18 DIAGNOSIS — E03.9 HYPOTHYROIDISM, UNSPECIFIED TYPE: ICD-10-CM

## 2017-10-18 DIAGNOSIS — D68.61 ANTIPHOSPHOLIPID ANTIBODY SYNDROME (H): ICD-10-CM

## 2017-10-18 DIAGNOSIS — K21.9 GASTROESOPHAGEAL REFLUX DISEASE, ESOPHAGITIS PRESENCE NOT SPECIFIED: ICD-10-CM

## 2017-10-18 DIAGNOSIS — C48.0 LIPOSARCOMA OF RETROPERITONEUM (H): ICD-10-CM

## 2017-10-18 DIAGNOSIS — D63.0 ANEMIA IN NEOPLASTIC DISEASE: ICD-10-CM

## 2017-10-18 PROCEDURE — 99214 OFFICE O/P EST MOD 30 MIN: CPT | Mod: ZP | Performed by: INTERNAL MEDICINE

## 2017-10-18 PROCEDURE — 99212 OFFICE O/P EST SF 10 MIN: CPT | Mod: ZF

## 2017-10-18 ASSESSMENT — PAIN SCALES - GENERAL: PAINLEVEL: NO PAIN (0)

## 2017-10-18 NOTE — NURSING NOTE
"Oncology Rooming Note    October 18, 2017 11:16 AM   Shiela eHwitt is a 62 year old female who presents for:    Chief Complaint   Patient presents with     Oncology Clinic Visit     Liposarcoma F/U     Initial Vitals: /68  Pulse 73  Temp 97.5  F (36.4  C) (Oral)  Resp 18  Ht 1.74 m (5' 8.5\")  Wt 80.2 kg (176 lb 14.4 oz)  SpO2 99%  BMI 26.5 kg/m2 Estimated body mass index is 26.5 kg/(m^2) as calculated from the following:    Height as of this encounter: 1.74 m (5' 8.5\").    Weight as of this encounter: 80.2 kg (176 lb 14.4 oz). Body surface area is 1.97 meters squared.  No Pain (0) Comment: Data Unavailable   No LMP recorded. Patient is postmenopausal.  Allergies reviewed: Yes  Medications reviewed: Yes    Medications: Medication refills not needed today.  Pharmacy name entered into Recroup:    [id number] - [place] - [phone] & [facility] - [place] [address]  [facility] [place], [place] - [id number]  [facility] [place] [id number] AT [facility]    Clinical concerns: Pt reported having a small bowel obstruction in September over labor day. Dr Wiseman was NOT notified.    7 minutes for nursing intake (face to face time)     Eve Terrell LPN              "

## 2017-10-18 NOTE — MR AVS SNAPSHOT
After Visit Summary   10/18/2017    Shiela Hewitt    MRN: [id number]           Patient Information     Date Of Birth          1955        Visit Information        Provider Department      10/18/2017 11:30 AM Rob Wiseman MD [facility]        Today's Diagnoses     Liposarcoma of retroperitoneum (H)        Hypothyroidism, unspecified type        Antiphospholipid antibody syndrome (H)        Benign paroxysmal positional vertigo, unspecified laterality        Gastroesophageal reflux disease, esophagitis presence not specified        Anemia in neoplastic disease           Follow-ups after your visit        Your next 10 appointments already scheduled     Feb 20, 2018 10:15 AM CST   [facility] with  [facility]   [facility]    [address]  2nd Floor  [facility] [id number] [phone]            Feb 20, 2018 10:40 AM CST   (Arrive by 10:25 AM)   CT ABDOMEN PELVIS W/O & W CONTRAST with UCCT2   [facility] [place] [facility] ([facility])    [address]  1st Floor  [facility] [id number] [phone]           Please bring any scans or X-rays taken at other hospitals, if similar tests were done. Also bring a list of your medicines, including vitamins, minerals and over-the-counter drugs. It is safest to leave personal items at home.  Be sure to tell your doctor:   If you have any allergies.   If there s any chance you are pregnant.   If you are breastfeeding.   If you have any special needs.  You may have contrast for this exam. To prepare:   Do not eat or drink for 2 hours before your exam. If you need to take medicine, you may take it with small sips of water. (We may ask you to take liquid medicine as well.)   The day before your exam, drink extra fluids at least six 8-ounce glasses (unless your doctor tells you to restrict your fluids).  Patients over 70 or  patients with diabetes or kidney problems:   If you haven t had a blood test (creatinine test) within the last 30 days, go to your clinic or Diagnostic Imaging Department for this test.  If you have diabetes:   If your kidney function is normal, continue taking your metformin (Avandamet, Glucophage, Glucovance, Metaglip) on the day of your exam.   If your kidney function is abnormal, wait 48 hours before restarting this medicine.  You will have oral contrast for this exam:   You will drink the contrast at home. Get this from your clinic or Diagnostic Imaging Department. Please follow the directions given.  Please wear loose clothing, such as a sweat suit or jogging clothes. Avoid snaps, zippers and other metal. We may ask you to undress and put on a hospital gown.  If you have any questions, please call the Imaging Department where you will have your exam.            Feb 22, 2018 10:30 AM CST   (Arrive by 10:15 AM)   Return Visit with Rob Wiseman MD   [facility] ([facility])    [address] [place] [id number] [phone]            Jun 20, 2018 11:00 AM CDT   (Arrive by 10:45 AM)   Return Visit with Rob Wiseman MD   [facility] ([facility])    [address] [place] [id number] [phone]              Future tests that were ordered for you today     Open Future Orders        Priority Expected Expires Ordered    CT Abdomen Pelvis w/o & w Contrast Routine 2/20/2018 6/18/2018 10/18/2017    CBC with platelets differential Routine 2/20/2018 6/18/2018 10/18/2017    Comprehensive metabolic panel Routine 2/20/2018 6/18/2018 10/18/2017            Who to contact     If you have questions or need follow up information about today's clinic visit or your schedule please contact [facility] directly at [phone].  Normal or non-critical lab  "and imaging results will be communicated to you by MyChart, letter or phone within 4 business days after the clinic has received the results. If you do not hear from us within 7 days, please contact the clinic through [employer] or phone. If you have a critical or abnormal lab result, we will notify you by phone as soon as possible.  Submit refill requests through [employer] or call your pharmacy and they will forward the refill request to us. Please allow 3 business days for your refill to be completed.          Additional Information About Your Visit        [employer] Information     [employer] gives you secure access to your electronic health record. If you see a primary care provider, you can also send messages to your care team and make appointments. If you have questions, please call your primary care clinic.  If you do not have a primary care provider, please call [phone] and they will assist you.        Care EveryWhere ID     This is your Care EveryWhere ID. This could be used by other organizations to access your [facility] medical records  [id number]        Your Vitals Were     Pulse Temperature Respirations Height Pulse Oximetry BMI (Body Mass Index)    73 97.5  F (36.4  C) (Oral) 18 1.74 m (5' 8.5\") 99% 26.5 kg/m2       Blood Pressure from Last 3 Encounters:   10/18/17 130/68   10/16/17 114/69   06/28/17 128/71    Weight from Last 3 Encounters:   10/18/17 80.2 kg (176 lb 14.4 oz)   10/16/17 80.3 kg (177 lb)   06/28/17 79.9 kg (176 lb 2.4 oz)                 Today's Medication Changes          These changes are accurate as of: 10/18/17 11:58 AM.  If you have any questions, ask your nurse or doctor.               These medicines have changed or have updated prescriptions.        Dose/Directions    omeprazole 40 MG capsule   Commonly known as:  priLOSEC   This may have changed:    - when to take this  - reasons to take this   Used for:  Gastroesophageal reflux disease without esophagitis        Dose:  40 mg   Take " 1 capsule (40 mg) by mouth daily   Quantity:  30 capsule   Refills:  2                Primary Care Provider Office Phone # Fax #    Nahomy Desouza [phone]       [facility] [place] [address]  [place] [address]        Equal Access to Services     GREG CARR : Hadii aad ku hadasho Soomaali, waaxda luqadaha, qaybta kaalmada adeegyada, seven loyd rastacece madrigal maria doloresjanice liriano. So [facility] [phone].    ATENCIÓN: Si habla español, tiene a webb disposición servicios gratuitos de asistencia lingüística. Ovidio al [phone].    We comply with applicable federal civil rights laws and [place] laws. We do not discriminate on the basis of race, color, national origin, age, disability, sex, sexual orientation, or gender identity.            Thank you!     Thank you for choosing [facility]  for your care. Our goal is always to provide you with excellent care. Hearing back from our patients is one way we can continue to improve our services. Please take a few minutes to complete the written survey that you may receive in the mail after your visit with us. Thank you!             Your Updated Medication List - Protect others around you: Learn how to safely use, store and throw away your medicines at www.disposemymeds.org.          This list is accurate as of: 10/18/17 11:58 AM.  Always use your most recent med list.                   Brand Name Dispense Instructions for use Diagnosis    aspirin 81 MG chewable tablet      Take 81 mg by mouth daily    Liposarcoma of retroperitoneum (H), Hypothyroidism, unspecified type, Antiphospholipid antibody syndrome (H), Gastroesophageal reflux disease, esophagitis presence not specified, History of herpes genitalis, Displacement of lumbar intervertebral disc without myelopathy       levothyroxine 75 MCG tablet    SYNTHROID/LEVOTHROID     Take 88 mcg by mouth daily    Liposarcoma of retroperitoneum (H), Hypothyroidism, unspecified  type, Antiphospholipid antibody syndrome (H), Gastroesophageal reflux disease, esophagitis presence not specified, History of herpes genitalis, Displacement of lumbar intervertebral disc without myelopathy       loperamide 2 MG capsule    IMODIUM     Take 2 mg by mouth every 48 hours        LORazepam 0.5 MG tablet    ATIVAN    30 tablet    Take 1 tablet (0.5 mg) by mouth every 4 hours as needed (Anxiety, Nausea/Vomiting or Sleep)    Liposarcoma (H)       methylPREDNISolone 32 MG tablet    MEDROL    2 tablet    Take 1 tablet (32 mg) by mouth daily Take 1 tablet 12 hours before CT scan, take the other tablet 2 hours before CT scan    Liposarcoma of retroperitoneum (H)       MULTIVITAMIN GUMMIES ADULT PO      Take 2 chew tab by mouth daily        omeprazole 40 MG capsule    priLOSEC    30 capsule    Take 1 capsule (40 mg) by mouth daily    Gastroesophageal reflux disease without esophagitis       RESTASIS OP      Apply 1 drop to eye 2 times daily        traZODone 50 MG tablet    DESYREL    30 tablet    Take 1 tablet (50 mg) by mouth nightly as needed for sleep    Insomnia, unspecified type

## 2017-10-18 NOTE — PROGRESS NOTES
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   -  Background  In brief, she noted an abdominal mass at the end of 2013, which was quite large and was resected on 01/28/2014. This tumor was about 26 cm in greatest diameter, high-grade, with necrosis present. In retrospect, she had had a CT scan done about 2 years before that, which was felt to be negative. In 08/2014, repeat imaging revealed a 3.5 cm mass near the psoas, and she received preoperative radiation therapy for this. 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 A new margin was obtained, but this was still positive for liposarcoma. All visible tumor was removed.      A recurrence was noted in the abd and she began lipodox/ifos about 3-19-15 and had 6 cycles; the last in August 2015.  -         Interval history      She is doing pretty well overall.      She had an episode of SBO in [place] in early Sept and was hospitalized for 3 days w no NG.  She is taking less imodium now due to that.     GERD is generally OK off prilosec.     She developed facial and neck redness and some burning with CT contrast yesterday at 10 AM and she did take MP and benadryl - the same thing happened last time.     Her R upper leg bothers her sometimes-no change.  She walks about 3/4 mile over 20 min and notes some MEDRANO at the end of that.  She noted some MEDRANO going up the big clinic stairs today.  She is going to try in increase her activity to try to improve endurance.    The paresthesias with pins-and-needles sensation in the right groin and upper thigh are not much changed.        She goes to [place] in Jan.    She switched to trazadone for the restless legs w her new PCP.      BMs were pretty good not w improvement of chronic diarrhea w 1 Imodium a day.  Now she is using less and has more BMs.      She has a longstanding history of hypothyroidism on replacement T4, and  "some seasonal allergies. She was also noted to have an antiphospholipid antibody. This was checked because her sister presented with a mesenteric artery clot. The patient herself was on 81 mg of aspirin a day in this regard, and has not had any problems.     Otherwise,basically her 10-point ROS is notable for chronic hearing loss in the right ear, intermittent positional vertigo which she treats with Meclizine when presen, intermittent GERD, a history of genital herpes, and an L4-L5 disk herniation in 1997.       -  -  Background PMH, FH, SH  PAST MEDICAL HISTORY: She did have a tonsillectomy at age 15 and some sinus surgery in about 1994 along with a tubal pregnancy and a tubal ligation.   ALLERGIES: She does describe 1 hive after IV contrast material about 20 years ago, so she has been taking prednisone and Benadryl for contrast scans. She does have some itching with Dilaudid, but still uses it.   SOCIAL HISTORY: She is a never smoker and does not abuse alcohol or other drugs. 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  -          On exam she appeared comfortable with normal affect.   /68  Pulse 73  Temp 97.5  F (36.4  C) (Oral)  Resp 18  Ht 1.74 m (5' 8.5\")  Wt 80.2 kg (176 lb 14.4 oz)  SpO2 99%  BMI 26.5 kg/m2  HEENT No icterus.   NECK No thyromegaly.  LYMPH No lymphadenopathy.  CHEST clear chest.   CV RRR w no sig murmur.  ABD No HSMT.  NEURO Normal Romberg, heel/toe.  EXT No edema.    PSYCH Mood good.      -  GNE, LFT, CBC OK though .    -   It was felt that on her outside imaging pre chemo that there seemed to be a recurrence in the abd; this is a complicated image. This region on the psoas seemed a bit more prominent on 3-14 c/w Feb, and on the 3-31 is less prominent. We dont have a biopsy of this but the series was c/w tumor response. The CT of ay 2015 showed some colitis.    I reviewed her new CT images today and report and there is no " clear PD and that is the formal read.    -     We discussed a number of issues. She had the facial redness with the last scan again (typically the next day) but it did not bother her much so we will not change anything in that regard next time. She will MP pre scan again.     She has steroid for the next scan.    It was 2 years post chemotherapy in August 2017.  We will plan to restage in Feb.       All questions were addressed and she will call if other questions arise.  -  Rob Wiseman M.D.  Professor  Hematology, Oncology and Transplantation  -  cc: Sergio Haider MD   [facility]   Department of Surgery

## 2017-10-18 NOTE — LETTER
10/18/2017       RE: Shiela Hewitt  [address] [place] [address]     Dear Colleague,    Thank you for referring your patient, Shiela Hewitt, to the [facility]. Please see a copy of my visit note below.    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   -  Background  In brief, she noted an abdominal mass at the end of 2013, which was quite large and was resected on 01/28/2014. This tumor was about 26 cm in greatest diameter, high-grade, with necrosis present. In retrospect, she had had a CT scan done about 2 years before that, which was felt to be negative. In 08/2014, repeat imaging revealed a 3.5 cm mass near the psoas, and she received preoperative radiation therapy for this. 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 A new margin was obtained, but this was still positive for liposarcoma. All visible tumor was removed.      A recurrence was noted in the abd and she began lipodox/ifos about 3-19-15 and had 6 cycles; the last in August 2015.  -         Interval history      She is doing pretty well overall.      She had an episode of [place] in [place] in early Sept and was hospitalized for 3 days w no NG.  She is taking less imodium now due to that.     GERD is generally OK off prilosec.     She developed facial and neck redness and some burning with CT contrast yesterday at 10 AM and she did take MP and benadryl - the same thing happened last time.     Her R upper leg bothers her sometimes-no change.  She walks about 3/4 mile over 20 min and notes some MEDRANO at the end of that.  She noted some MEDRANO going up the big clinic stairs today.  She is going to try in increase her activity to try to improve endurance.    The paresthesias with pins-and-needles sensation in the right groin and upper thigh are not much changed.        She goes to [place] in Jan.    She switched to trazadone  "for the restless legs w her new PCP.      BMs were pretty good not w improvement of chronic diarrhea w 1 Imodium a day.  Now she is using less and has more BMs.      She has a longstanding history of hypothyroidism on replacement T4, and some seasonal allergies. She was also noted to have an antiphospholipid antibody. This was checked because her sister presented with a mesenteric artery clot. The patient herself was on 81 mg of aspirin a day in this regard, and has not had any problems.     Otherwise,basically her 10-point ROS is notable for chronic hearing loss in the right ear, intermittent positional vertigo which she treats with Meclizine when presen, intermittent GERD, a history of genital herpes, and an L4-L5 disk herniation in 1997.       -  -  Background PMH, FH, SH  PAST MEDICAL HISTORY: She did have a tonsillectomy at age 15 and some sinus surgery in about 1994 along with a tubal pregnancy and a tubal ligation.   ALLERGIES: She does describe 1 hive after IV contrast material about 20 years ago, so she has been taking prednisone and Benadryl for contrast scans. She does have some itching with Dilaudid, but still uses it.   SOCIAL HISTORY: She is a never smoker and does not abuse alcohol or other drugs. 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  -          On exam she appeared comfortable with normal affect.   /68  Pulse 73  Temp 97.5  F (36.4  C) (Oral)  Resp 18  Ht 1.74 m (5' 8.5\")  Wt 80.2 kg (176 lb 14.4 oz)  SpO2 99%  BMI 26.5 kg/m2  HEENT No icterus.   NECK No thyromegaly.  LYMPH No lymphadenopathy.  CHEST clear chest.   CV RRR w no sig murmur.  ABD No HSMT.  NEURO Normal Romberg, heel/toe.  EXT No edema.    PSYCH Mood good.      -  GNE, LFT, CBC OK though .    -   It was felt that on her outside imaging pre chemo that there seemed to be a recurrence in the abd; this is a complicated image. This region on the psoas seemed a bit more " prominent on 3-14 c/w Feb, and on the 3-31 is less prominent. We dont have a biopsy of this but the series was c/w tumor response. The CT of ay 2015 showed some colitis.    I reviewed her new CT images today and report and there is no clear PD and that is the formal read.    -     We discussed a number of issues. She had the facial redness with the last scan again (typically the next day) but it did not bother her much so we will not change anything in that regard next time. She will MP pre scan again.     She has steroid for the next scan.    It was 2 years post chemotherapy in August 2017.  We will plan to restage in Feb.       All questions were addressed and she will call if other questions arise.  -  Rob Wiseman M.D.  Professor  Hematology, Oncology and Transplantation  -  cc: Sergio Haider MD   [facility]   Department of Surgery

## 2017-10-19 LAB — DEPRECATED CALCIDIOL+CALCIFEROL SERPL-MC: 22 UG/L (ref 20–75)

## 2017-11-21 PROCEDURE — 25000128 H RX IP 250 OP 636: Performed by: INTERNAL MEDICINE

## 2017-11-21 PROCEDURE — 96523 IRRIG DRUG DELIVERY DEVICE: CPT

## 2017-11-21 RX ORDER — HEPARIN SODIUM (PORCINE) LOCK FLUSH IV SOLN 100 UNIT/ML 100 UNIT/ML
5 SOLUTION INTRAVENOUS
Status: COMPLETED | OUTPATIENT
Start: 2017-11-21 | End: 2017-11-21

## 2017-11-21 RX ADMIN — SODIUM CHLORIDE, PRESERVATIVE FREE 5 ML: 5 INJECTION INTRAVENOUS at 10:09

## 2017-11-21 NOTE — NURSING NOTE
Chief Complaint   Patient presents with     Port Flush     port flushed by rn     Good blood return noted in port.  Port flushed with NS and heparin by rn.  Pt tolerated well.  Otilia Montano RN

## 2017-12-21 PROCEDURE — 96523 IRRIG DRUG DELIVERY DEVICE: CPT

## 2017-12-21 PROCEDURE — 25000128 H RX IP 250 OP 636: Performed by: INTERNAL MEDICINE

## 2017-12-21 RX ORDER — HEPARIN SODIUM (PORCINE) LOCK FLUSH IV SOLN 100 UNIT/ML 100 UNIT/ML
5 SOLUTION INTRAVENOUS
Status: COMPLETED | OUTPATIENT
Start: 2017-12-21 | End: 2017-12-21

## 2017-12-21 RX ADMIN — SODIUM CHLORIDE, PRESERVATIVE FREE 5 ML: 5 INJECTION INTRAVENOUS at 09:56

## 2017-12-21 NOTE — NURSING NOTE
"Chief Complaint   Patient presents with     Port Flush     port flushed by rn.     Port accessed with 20 gauge 3/4\" gripper needle and labs drawn by rn.  Port flushed with NS and heparin; port de-accessed.  Pt tolerated well.    Otilia Montano RN      "

## 2018-02-10 ASSESSMENT — ENCOUNTER SYMPTOMS
SINUS PAIN: 1
ABDOMINAL PAIN: 0
TASTE DISTURBANCE: 0
BLOOD IN STOOL: 0
SHORTNESS OF BREATH: 0
WHEEZING: 0
COUGH DISTURBING SLEEP: 1
RECTAL PAIN: 0
HEARTBURN: 0
SPUTUM PRODUCTION: 1
SMELL DISTURBANCE: 0
JAUNDICE: 0
HEMOPTYSIS: 0
CONSTIPATION: 0
SORE THROAT: 1
BLOATING: 0
COUGH: 1
DYSPNEA ON EXERTION: 0
TROUBLE SWALLOWING: 0
DIARRHEA: 1
BOWEL INCONTINENCE: 0
POSTURAL DYSPNEA: 0
NECK MASS: 0
SNORES LOUDLY: 0
VOMITING: 0
NAUSEA: 0
SINUS CONGESTION: 1

## 2018-02-20 ENCOUNTER — RADIANT APPOINTMENT (OUTPATIENT)
Dept: CT IMAGING | Facility: CLINIC | Age: 63
End: 2018-02-20
Attending: INTERNAL MEDICINE
Payer: COMMERCIAL

## 2018-02-20 DIAGNOSIS — H81.10 BENIGN PAROXYSMAL POSITIONAL VERTIGO, UNSPECIFIED LATERALITY: ICD-10-CM

## 2018-02-20 DIAGNOSIS — D68.61 ANTIPHOSPHOLIPID ANTIBODY SYNDROME (H): ICD-10-CM

## 2018-02-20 DIAGNOSIS — K21.9 GASTROESOPHAGEAL REFLUX DISEASE, ESOPHAGITIS PRESENCE NOT SPECIFIED: ICD-10-CM

## 2018-02-20 DIAGNOSIS — E03.9 HYPOTHYROIDISM, UNSPECIFIED TYPE: ICD-10-CM

## 2018-02-20 DIAGNOSIS — D63.0 ANEMIA IN NEOPLASTIC DISEASE: ICD-10-CM

## 2018-02-20 DIAGNOSIS — C48.0 LIPOSARCOMA OF RETROPERITONEUM (H): ICD-10-CM

## 2018-02-20 LAB
ALBUMIN SERPL-MCNC: 4 G/DL (ref 3.4–5)
ALP SERPL-CCNC: 93 U/L (ref 40–150)
ALT SERPL W P-5'-P-CCNC: 22 U/L (ref 0–50)
ANION GAP SERPL CALCULATED.3IONS-SCNC: 9 MMOL/L (ref 3–14)
AST SERPL W P-5'-P-CCNC: 18 U/L (ref 0–45)
BASOPHILS # BLD AUTO: 0 10E9/L (ref 0–0.2)
BASOPHILS NFR BLD AUTO: 0.1 %
BILIRUB SERPL-MCNC: 0.4 MG/DL (ref 0.2–1.3)
BUN SERPL-MCNC: 23 MG/DL (ref 7–30)
CALCIUM SERPL-MCNC: 9.4 MG/DL (ref 8.5–10.1)
CHLORIDE SERPL-SCNC: 105 MMOL/L (ref 94–109)
CO2 SERPL-SCNC: 24 MMOL/L (ref 20–32)
CREAT SERPL-MCNC: 0.82 MG/DL (ref 0.52–1.04)
DIFFERENTIAL METHOD BLD: ABNORMAL
EOSINOPHIL # BLD AUTO: 0 10E9/L (ref 0–0.7)
EOSINOPHIL NFR BLD AUTO: 0 %
ERYTHROCYTE [DISTWIDTH] IN BLOOD BY AUTOMATED COUNT: 12.4 % (ref 10–15)
GFR SERPL CREATININE-BSD FRML MDRD: 70 ML/MIN/1.7M2
GLUCOSE SERPL-MCNC: 134 MG/DL (ref 70–99)
HCT VFR BLD AUTO: 39.7 % (ref 35–47)
HGB BLD-MCNC: 13.4 G/DL (ref 11.7–15.7)
IMM GRANULOCYTES # BLD: 0 10E9/L (ref 0–0.4)
IMM GRANULOCYTES NFR BLD: 0.3 %
LYMPHOCYTES # BLD AUTO: 0.5 10E9/L (ref 0.8–5.3)
LYMPHOCYTES NFR BLD AUTO: 7 %
MCH RBC QN AUTO: 33.8 PG (ref 26.5–33)
MCHC RBC AUTO-ENTMCNC: 33.8 G/DL (ref 31.5–36.5)
MCV RBC AUTO: 100 FL (ref 78–100)
MONOCYTES # BLD AUTO: 0 10E9/L (ref 0–1.3)
MONOCYTES NFR BLD AUTO: 0.4 %
NEUTROPHILS # BLD AUTO: 6.2 10E9/L (ref 1.6–8.3)
NEUTROPHILS NFR BLD AUTO: 92.2 %
NRBC # BLD AUTO: 0 10*3/UL
NRBC BLD AUTO-RTO: 0 /100
PLATELET # BLD AUTO: 174 10E9/L (ref 150–450)
POTASSIUM SERPL-SCNC: 4 MMOL/L (ref 3.4–5.3)
PROT SERPL-MCNC: 7.6 G/DL (ref 6.8–8.8)
RBC # BLD AUTO: 3.97 10E12/L (ref 3.8–5.2)
SODIUM SERPL-SCNC: 138 MMOL/L (ref 133–144)
WBC # BLD AUTO: 6.7 10E9/L (ref 4–11)

## 2018-02-20 PROCEDURE — 80053 COMPREHEN METABOLIC PANEL: CPT | Performed by: INTERNAL MEDICINE

## 2018-02-20 PROCEDURE — 85025 COMPLETE CBC W/AUTO DIFF WBC: CPT | Performed by: INTERNAL MEDICINE

## 2018-02-20 PROCEDURE — 25000128 H RX IP 250 OP 636: Performed by: INTERNAL MEDICINE

## 2018-02-20 RX ORDER — IOPAMIDOL 755 MG/ML
108 INJECTION, SOLUTION INTRAVASCULAR ONCE
Status: COMPLETED | OUTPATIENT
Start: 2018-02-20 | End: 2018-02-20

## 2018-02-20 RX ORDER — HEPARIN SODIUM (PORCINE) LOCK FLUSH IV SOLN 100 UNIT/ML 100 UNIT/ML
5 SOLUTION INTRAVENOUS EVERY 8 HOURS PRN
Status: DISCONTINUED | OUTPATIENT
Start: 2018-02-20 | End: 2018-02-28 | Stop reason: HOSPADM

## 2018-02-20 RX ORDER — HEPARIN SODIUM (PORCINE) LOCK FLUSH IV SOLN 100 UNIT/ML 100 UNIT/ML
500 SOLUTION INTRAVENOUS ONCE
Status: COMPLETED | OUTPATIENT
Start: 2018-02-20 | End: 2018-02-20

## 2018-02-20 RX ADMIN — HEPARIN SODIUM (PORCINE) LOCK FLUSH IV SOLN 100 UNIT/ML 500 UNITS: 100 SOLUTION at 11:04

## 2018-02-20 RX ADMIN — SODIUM CHLORIDE, PRESERVATIVE FREE 5 ML: 5 INJECTION INTRAVENOUS at 10:29

## 2018-02-20 RX ADMIN — IOPAMIDOL 108 ML: 755 INJECTION, SOLUTION INTRAVASCULAR at 11:02

## 2018-02-20 NOTE — NURSING NOTE
Chief Complaint   Patient presents with     Port Draw     Labs drawn via port by RN. Line flushed and hep locked. VS taken.     Dorene Medina RN

## 2018-02-20 NOTE — DISCHARGE INSTRUCTIONS

## 2018-02-22 ENCOUNTER — ONCOLOGY VISIT (OUTPATIENT)
Dept: ONCOLOGY | Facility: CLINIC | Age: 63
End: 2018-02-22
Attending: INTERNAL MEDICINE
Payer: COMMERCIAL

## 2018-02-22 VITALS
HEART RATE: 71 BPM | DIASTOLIC BLOOD PRESSURE: 70 MMHG | BODY MASS INDEX: 26.28 KG/M2 | OXYGEN SATURATION: 98 % | HEIGHT: 69 IN | WEIGHT: 177.4 LBS | RESPIRATION RATE: 16 BRPM | TEMPERATURE: 97.3 F | SYSTOLIC BLOOD PRESSURE: 107 MMHG

## 2018-02-22 DIAGNOSIS — K21.9 GASTROESOPHAGEAL REFLUX DISEASE, ESOPHAGITIS PRESENCE NOT SPECIFIED: ICD-10-CM

## 2018-02-22 DIAGNOSIS — D68.61 ANTIPHOSPHOLIPID ANTIBODY SYNDROME (H): ICD-10-CM

## 2018-02-22 DIAGNOSIS — C48.0 LIPOSARCOMA OF RETROPERITONEUM (H): ICD-10-CM

## 2018-02-22 DIAGNOSIS — M51.26 DISPLACEMENT OF LUMBAR INTERVERTEBRAL DISC WITHOUT MYELOPATHY: ICD-10-CM

## 2018-02-22 DIAGNOSIS — E06.3 HYPOTHYROIDISM DUE TO HASHIMOTO'S THYROIDITIS: ICD-10-CM

## 2018-02-22 PROCEDURE — G0463 HOSPITAL OUTPT CLINIC VISIT: HCPCS | Mod: ZF

## 2018-02-22 PROCEDURE — 99214 OFFICE O/P EST MOD 30 MIN: CPT | Mod: ZP | Performed by: INTERNAL MEDICINE

## 2018-02-22 ASSESSMENT — PAIN SCALES - GENERAL: PAINLEVEL: NO PAIN (0)

## 2018-02-22 NOTE — PROGRESS NOTES
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   -  Background  In brief, she noted an abdominal mass at the end of 2013, which was quite large and was resected on 01/28/2014. This tumor was about 26 cm in greatest diameter, high-grade, with necrosis present. In retrospect, she had had a CT scan done about 2 years before that, which was felt to be negative. In 08/2014, repeat imaging revealed a 3.5 cm mass near the psoas, and she received preoperative radiation therapy for this. 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 A new margin was obtained, but this was still positive for liposarcoma. All visible tumor was removed.      A recurrence was noted in the abd and she began lipodox/ifos about 3-19-15 and had 6 cycles; the last in August 2015.  -      Interval history  Sandro is doing very well.  She usually stays in [place] from January through May.  She and her  drove back to [place] for this appointment.  She has good appetite and her weight is relatively stable.  She plays golf in [place].  She had some numbness in her toes.  Right thigh is also numb since the second surgery.  She has some weakness in the right leg but is getting better and if that does not affect her life.  She is off omeprazole.  She has some loose stool and she takes Imodium for it.  She had a CAT scan 2 days ago.  Her face was red yesterday; today it is back to normal.  Denies fever, chills, shortness of breath, chest pain, diarrhea or constipation. She plans to drive back to [place] after today's visit.        She has a longstanding history of hypothyroidism on replacement T4, and some seasonal allergies. She was also noted to have an antiphospholipid antibody. This was checked because her sister presented with a mesenteric artery clot. The patient herself was on 81 mg of aspirin a day in this regard, and has not  "had any problems.     Her 10-point ROS is otherwise unremarkable    -  Background PMH, FH, SH  PAST MEDICAL HISTORY: She did have a tonsillectomy at age 15 and some sinus surgery in about 1994 along with a tubal pregnancy and a tubal ligation.   ALLERGIES: She does describe 1 hive after IV contrast material about 20 years ago, so she has been taking prednisone and Benadryl for contrast scans. She does have some itching with Dilaudid, but still uses it.   SOCIAL HISTORY: She is a never smoker and does not abuse alcohol or other drugs. 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  -          On exam she appeared comfortable with normal affect.   /70 (BP Location: Right arm, Patient Position: Sitting, Cuff Size: Adult Regular)  Pulse 71  Temp 97.3  F (36.3  C) (Oral)  Resp 16  Ht 1.74 m (5' 8.5\")  Wt 80.5 kg (177 lb 6.4 oz)  SpO2 98%  BMI 26.58 kg/m2  HEENT No icterus.   NECK No thyromegaly or JVD.  LYMPH No lymphadenopathy.  CHEST clear chest.   CV RRR w no sig murmur.  ABD No HSMT.  NEURO AAO X3, nonfocal.  EXT No edema.    PSYCH Mood good.      -  Labs unremarkable    -   It was felt that on her outside imaging pre chemo that there seemed to be a recurrence in the abd; this was a complicated image. This region on the psoas seemed a bit more prominent on 3-14 c/w Feb, and on the 3-31 is less prominent. We dont have a biopsy of this but the series was c/w tumor response. The CT of ay 2015 showed some colitis.    We reviewed her new CT images today and report and there is no clear PD and that is the formal read.    -  A/P   We discussed a number of issues. She continues to have facial redness after the scan (typically the next day) but it did not bother her much so we will not change anything in that regard next time. She has steroid for the next scan.    She is about  2.5 years post Chemotherapy and is doing well.  We will plan to restage in 6/2018 and then " 11/2018.       All questions were addressed and she will call if other questions arise.  -  Discussed with Dr. Bowen Patterson MD, PhD  Hem/Onc Fellow    I examined the patient w Dr Patterson and agree w the above note.      Rob Wiseman M.D.  Professor  Hematology, Oncology and Transplantation

## 2018-02-22 NOTE — MR AVS SNAPSHOT
After Visit Summary   2/22/2018    Shiela Hewitt    MRN: [id number]           Patient Information     Date Of Birth          1955        Visit Information        Provider Department      2/22/2018 10:30 AM Rob Wiseman MD [facility]        Today's Diagnoses     Liposarcoma of retroperitoneum (H)        Hypothyroidism due to Hashimoto's thyroiditis        Antiphospholipid antibody syndrome (H)        Gastroesophageal reflux disease, esophagitis presence not specified        Displacement of lumbar intervertebral disc without myelopathy           Follow-ups after your visit        Your next 10 appointments already scheduled     Jun 18, 2018 10:15 AM CDT   [facility] with  [facility]   [facility] ([facility])    [address]  [address]  [facility] [id number] [phone]            Jun 18, 2018 10:40 AM CDT   (Arrive by 10:25 AM)   CT CHEST ABDOMEN PELVIS W/O & W CONTRAST with UCCT1   [facility] [place] ([facility])    [address]  1st Floor  [facility] [id number] [phone]           Please bring any scans or X-rays taken at other hospitals, if similar tests were done. Also bring a list of your medicines, including vitamins, minerals and over-the-counter drugs. It is safest to leave personal items at home.  Be sure to tell your doctor:   If you have any allergies.   If there s any chance you are pregnant.   If you are breastfeeding.  You may have contrast for this exam. To prepare:   Do not eat or drink for 2 hours before your exam. If you need to take medicine, you may take it with small sips of water. (We may ask you to take liquid medicine as well.)   The day before your exam, drink extra fluids at least six 8-ounce glasses (unless your doctor tells you to restrict your fluids).   You will be given instructions on how to drink the contrast.   Patients over 70 or patients with diabetes or kidney problems:   If you haven t had a blood test (creatinine test) within the last 30 days, the Cardiologist/Radiologist may require you to get this test prior to your exam.  If you have diabetes:   Continue to take your metformin medication on the day of your exam  Please wear loose clothing, such as a sweat suit or jogging clothes. Avoid snaps, zippers and other metal. We may ask you to undress and put on a hospital gown.  If you have any questions, please call the Imaging Department where you will have your exam.            Jun 20, 2018 11:00 AM CDT   (Arrive by 10:45 AM)   Return Visit with Rob Wiseman MD   [facility] ([facility])    [address] [place] [address]  [address] [place] [id number] [phone]            Nov 29, 2018 10:00 AM CST   (Arrive by 9:45 AM)   Return Visit with Rob Wiseman MD   [facility] ([facility])    [address] [place] [address]  [address] [place] [id number] [phone]              Future tests that were ordered for you today     Open Future Orders        Priority Expected Expires Ordered    CT Chest Abdomen Pelvis w/o & w Contrast Routine 6/18/2018 12/22/2018 2/22/2018    CBC with platelets differential Routine 6/18/2018 12/22/2018 2/22/2018    Comprehensive metabolic panel Routine 6/18/2018 12/22/2018 2/22/2018            Who to contact     If you have questions or need follow up information about today's clinic visit or your schedule please contact [facility] directly at [phone].  Normal or non-critical lab and imaging results will be communicated to you by MyChart, letter or phone within 4 business days after the clinic has received the results. If you do not hear from us within 7 days, please contact the clinic through MyChart or phone. If you have a critical or abnormal lab result, we will  "notify you by phone as soon as possible.  Submit refill requests through RocketOn or call your pharmacy and they will forward the refill request to us. Please allow 3 business days for your refill to be completed.          Additional Information About Your Visit        Microinoxhart Information     [employer]On gives you secure access to your electronic health record. If you see a primary care provider, you can also send messages to your care team and make appointments. If you have questions, please call your primary care clinic.  If you do not have a primary care provider, please call [phone] and they will assist you.        Care EveryWhere ID     This is your Care EveryWhere ID. This could be used by other organizations to access your [place] medical records  [id number]        Your Vitals Were     Pulse Temperature Respirations Height Pulse Oximetry BMI (Body Mass Index)    71 97.3  F (36.3  C) (Oral) 16 1.74 m (5' 8.5\") 98% 26.58 kg/m2       Blood Pressure from Last 3 Encounters:   02/22/18 107/70   10/18/17 130/68   10/16/17 114/69    Weight from Last 3 Encounters:   02/22/18 80.5 kg (177 lb 6.4 oz)   10/18/17 80.2 kg (176 lb 14.4 oz)   10/16/17 80.3 kg (177 lb)               Primary Care Provider Office Phone # Fax #    Nahomy Desouza [phone]       [address] ANTONY [address] [place] [phone]        Equal Access to Services     GREG CARR : Hadii aad ku hadasho Soomaali, waaxda luqadaha, qaybta kaalmada adeegyada, seven ramirez . So [facility] [phone].    ATENCIÓN: Si habla español, tiene a webb disposición servicios gratuitos de asistencia lingüística. Ovidio al [phone].    We comply with applicable federal civil rights laws and [place] laws. We do not discriminate on the basis of race, color, national origin, age, disability, sex, sexual orientation, or gender identity.            Thank you!     Thank you for choosing [facility]  for your care. Our goal is always to provide you with excellent care. Hearing back from our patients is one way we can continue to improve our services. Please take a few minutes to complete the written survey that you may receive in the mail after your visit with us. Thank you!             Your Updated Medication List - Protect others around you: Learn how to safely use, store and throw away your medicines at www.disposemymeds.org.          This list is accurate as of 2/22/18 10:58 AM.  Always use your most recent med list.                   Brand Name Dispense Instructions for use Diagnosis    aspirin 81 MG chewable tablet      Take 81 mg by mouth daily    Liposarcoma of retroperitoneum (H), Hypothyroidism, unspecified type, Antiphospholipid antibody syndrome (H), Gastroesophageal reflux disease, esophagitis presence not specified, History of herpes genitalis, Displacement of lumbar intervertebral disc without myelopathy       levothyroxine 75 MCG tablet    SYNTHROID/LEVOTHROID     Take 88 mcg by mouth daily    Liposarcoma of retroperitoneum (H), Hypothyroidism, unspecified type, Antiphospholipid antibody syndrome (H), Gastroesophageal reflux disease, esophagitis presence not specified, History of herpes genitalis, Displacement of lumbar intervertebral disc without myelopathy       loperamide 2 MG capsule    IMODIUM     Take 2 mg by mouth every 48 hours        LORazepam 0.5 MG tablet    ATIVAN    30 tablet    Take 1 tablet (0.5 mg) by mouth every 4 hours as needed (Anxiety, Nausea/Vomiting or Sleep)    Liposarcoma (H)       methylPREDNISolone 32 MG tablet    MEDROL    2 tablet    Take 1 tablet (32 mg) by mouth daily Take 1 tablet 12 hours before CT scan, take the other tablet 2 hours before CT scan    Liposarcoma of retroperitoneum (H)       MULTIVITAMIN GUMMIES ADULT PO      Take 2 chew tab by mouth daily        omeprazole 40 MG capsule    priLOSEC    30 capsule    Take 1 capsule (40 mg) by mouth  daily    Gastroesophageal reflux disease without esophagitis       RESTASIS OP      Apply 1 drop to eye 2 times daily        traZODone 50 MG tablet    DESYREL    30 tablet    Take 1 tablet (50 mg) by mouth nightly as needed for sleep    Insomnia, unspecified type

## 2018-02-22 NOTE — LETTER
2/22/2018       RE: Shiela Hewitt  [address] [place] [address]     Dear Colleague,    Thank you for referring your patient, Shiela Hewitt, to the [facility]. Please see a copy of my visit note below.    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   -  Background  In brief, she noted an abdominal mass at the end of 2013, which was quite large and was resected on 01/28/2014. This tumor was about 26 cm in greatest diameter, high-grade, with necrosis present. In retrospect, she had had a CT scan done about 2 years before that, which was felt to be negative. In 08/2014, repeat imaging revealed a 3.5 cm mass near the psoas, and she received preoperative radiation therapy for this. 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 A new margin was obtained, but this was still positive for liposarcoma. All visible tumor was removed.      A recurrence was noted in the abd and she began lipodox/ifos about 3-19-15 and had 6 cycles; the last in August 2015.  -      Interval history  Sandro is doing very well.  She usually stays in [place] from January through May.  She and her  drove back to [place] for this appointment.  She has good appetite and her weight is relatively stable.  She plays golf in [place].  She had some numbness in her toes.  Right thigh is also numb since the second surgery.  She has some weakness in the right leg but is getting better and if that does not affect her life.  She is off omeprazole.  She has some loose stool and she takes Imodium for it.  She had a CAT scan 2 days ago.  Her face was red yesterday; today it is back to normal.  Denies fever, chills, shortness of breath, chest pain, diarrhea or constipation. She plans to drive back to [place] after today's visit.        She has a longstanding history of hypothyroidism on replacement T4, and  "some seasonal allergies. She was also noted to have an antiphospholipid antibody. This was checked because her sister presented with a mesenteric artery clot. The patient herself was on 81 mg of aspirin a day in this regard, and has not had any problems.     Her 10-point ROS is otherwise unremarkable    -  Background PMH, FH, SH  PAST MEDICAL HISTORY: She did have a tonsillectomy at age 15 and some sinus surgery in about 1994 along with a tubal pregnancy and a tubal ligation.   ALLERGIES: She does describe 1 hive after IV contrast material about 20 years ago, so she has been taking prednisone and Benadryl for contrast scans. She does have some itching with Dilaudid, but still uses it.   SOCIAL HISTORY: She is a never smoker and does not abuse alcohol or other drugs. 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  -          On exam she appeared comfortable with normal affect.   /70 (BP Location: Right arm, Patient Position: Sitting, Cuff Size: Adult Regular)  Pulse 71  Temp 97.3  F (36.3  C) (Oral)  Resp 16  Ht 1.74 m (5' 8.5\")  Wt 80.5 kg (177 lb 6.4 oz)  SpO2 98%  BMI 26.58 kg/m2  HEENT No icterus.   NECK No thyromegaly or JVD.  LYMPH No lymphadenopathy.  CHEST clear chest.   CV RRR w no sig murmur.  ABD No HSMT.  NEURO AAO X3, nonfocal.  EXT No edema.    PSYCH Mood good.      -  Labs unremarkable    -   It was felt that on her outside imaging pre chemo that there seemed to be a recurrence in the abd; this was a complicated image. This region on the psoas seemed a bit more prominent on 3-14 c/w Feb, and on the 3-31 is less prominent. We dont have a biopsy of this but the series was c/w tumor response. The CT of ay 2015 showed some colitis.    We reviewed her new CT images today and report and there is no clear PD and that is the formal read.    -  A/P   We discussed a number of issues. She continues to have facial redness after the scan (typically the next " day) but it did not bother her much so we will not change anything in that regard next time. She has steroid for the next scan.    She is about  2.5 years post Chemotherapy and is doing well.  We will plan to restage in 6/2018 and then 11/2018.       All questions were addressed and she will call if other questions arise.  -  Discussed with Dr. Bowen Patterson MD, PhD  Hem/Onc Fellow    I examined the patient w Dr Patterson and agree w the above note.      Rob Wiseman M.D.  Professor  Hematology, Oncology and Transplantation

## 2018-02-22 NOTE — NURSING NOTE
"Oncology Rooming Note    February 22, 2018 10:14 AM   Shiela Hewitt is a 62 year old female who presents for:    Chief Complaint   Patient presents with     Oncology Clinic Visit     f/u Liposarcoma     Initial Vitals: /70 (BP Location: Right arm, Patient Position: Sitting, Cuff Size: Adult Regular)  Pulse 71  Temp 97.3  F (36.3  C) (Oral)  Resp 16  Ht 1.74 m (5' 8.5\")  Wt 80.5 kg (177 lb 6.4 oz)  SpO2 98%  BMI 26.58 kg/m2 Estimated body mass index is 26.58 kg/(m^2) as calculated from the following:    Height as of this encounter: 1.74 m (5' 8.5\").    Weight as of this encounter: 80.5 kg (177 lb 6.4 oz). Body surface area is 1.97 meters squared.  No Pain (0) Comment: Data Unavailable   No LMP recorded. Patient is postmenopausal.  Allergies reviewed: Yes  Medications reviewed: Yes    Medications: Medication refills not needed today.  Pharmacy name entered into [email or web address]:    [id number] [place] - [phone] [place] [address]  [facility] [place], [place] - [address] [place] [address] [phone]  [facility] [address] [place], [place] [id number] [facility]    Clinical concerns: none Dr Wiseman was NOT notified.    10 minutes for nursing intake (face to face time)     NICK BRITT LPN            "

## 2018-04-20 ENCOUNTER — TELEPHONE (OUTPATIENT)
Dept: FAMILY MEDICINE | Facility: CLINIC | Age: 63
End: 2018-04-20

## 2018-04-20 DIAGNOSIS — D68.61 ANTIPHOSPHOLIPID ANTIBODY SYNDROME (H): ICD-10-CM

## 2018-04-20 DIAGNOSIS — E03.9 HYPOTHYROIDISM, UNSPECIFIED TYPE: ICD-10-CM

## 2018-04-20 DIAGNOSIS — C48.0 LIPOSARCOMA OF RETROPERITONEUM (H): ICD-10-CM

## 2018-04-20 DIAGNOSIS — Z86.19 HISTORY OF HERPES GENITALIS: ICD-10-CM

## 2018-04-20 DIAGNOSIS — K21.9 GASTROESOPHAGEAL REFLUX DISEASE, ESOPHAGITIS PRESENCE NOT SPECIFIED: ICD-10-CM

## 2018-04-20 DIAGNOSIS — M51.26 DISPLACEMENT OF LUMBAR INTERVERTEBRAL DISC WITHOUT MYELOPATHY: ICD-10-CM

## 2018-04-20 RX ORDER — LEVOTHYROXINE SODIUM 75 UG/1
TABLET ORAL
Qty: 30 TABLET | Refills: 0 | Status: SHIPPED | OUTPATIENT
Start: 2018-04-20 | End: 2018-04-23

## 2018-04-20 RX ORDER — LEVOTHYROXINE SODIUM 75 UG/1
88 TABLET ORAL DAILY
Qty: 30 TABLET | Refills: 0 | Status: SHIPPED | OUTPATIENT
Start: 2018-04-20 | End: 2018-04-20

## 2018-04-20 RX ORDER — LEVOTHYROXINE SODIUM 88 UG/1
88 TABLET ORAL DAILY
Qty: 90 TABLET | Refills: 1 | Status: CANCELLED | OUTPATIENT
Start: 2018-04-20

## 2018-04-20 NOTE — TELEPHONE ENCOUNTER
Reason for Call:  Medication or medication refill:    Do you use a [facility]?  Name of the pharmacy and phone number for the current request:  [facility], [phone], [address], [place], [address]    Name of the medication requested: levothyroxine (SYNTHROID/LEVOTHROID) 88 micrograms.    Other request: patient had established care with Dr Vieira back in October of 2017.  Had not needed any refills of her Levothyroxine until now.  Patient is in [place] but is needing DR Vieira to send a script to her pharmacy down there.      Can we leave a detailed message on this number? YES    Phone number patient can be reached at: Home number on file [phone] (home)    Best Time: any    Call taken on 4/20/2018 at 11:40 AM by Mirta Luque

## 2018-04-20 NOTE — TELEPHONE ENCOUNTER
"LOV- 10/16/17. Overdue for labs, ordered HM & sent jefry with note.  Melissa Paul, RN    Requested Prescriptions   Pending Prescriptions Disp Refills     levothyroxine (SYNTHROID/LEVOTHROID) 75 MCG tablet [Pharmacy Med Name: LEVOTHYROXINE 0.075MG (75MCG) TABS] 90 tablet 0     Sig: TAKE 1 TABLET(75 MCG) BY MOUTH DAILY    Thyroid Protocol Failed    4/20/2018  1:36 PM       Failed - Normal TSH on file in past 12 months    Recent Labs   Lab Test  06/16/16   1016   TSH  1.24             Passed - Patient is 12 years or older       Passed - Recent (12 mo) or future (30 days) visit within the authorizing provider's specialty    Patient had office visit in the last 12 months or has a visit in the next 30 days with authorizing provider or within the authorizing provider's specialty.  See \"Patient Info\" tab in inbasket, or \"Choose Columns\" in Meds & Orders section of the refill encounter.           Passed - No active pregnancy on record    If patient is pregnant or has had a positive pregnancy test, please check TSH.         Passed - No positive pregnancy test in past 12 months    If patient is pregnant or has had a positive pregnancy test, please check TSH.            "

## 2018-04-20 NOTE — TELEPHONE ENCOUNTER
Route to covering providers. Per 10/16/18 OV note. Med is listed as historical:  ASSESSMENT/PLAN:      Hypothyroidism; controlled/euthyroid                        Plan:  No changes in the patient's current treatment plan  Exam Date Exam Time Accession # Results    6/16/16 10:16 AM [id number]    Component Results   Component Value Flag Ref Range Units Status Collected Lab   TSH 1.24  0.40 - 4.00 mU/L Final       Order is pended.

## 2018-04-22 RX ORDER — LEVOTHYROXINE SODIUM 75 UG/1
75 TABLET ORAL DAILY
Qty: 30 TABLET | Refills: 0 | Status: SHIPPED | OUTPATIENT
Start: 2018-04-22 | End: 2018-04-23

## 2018-04-23 DIAGNOSIS — E03.9 HYPOTHYROIDISM, UNSPECIFIED TYPE: ICD-10-CM

## 2018-04-23 RX ORDER — LEVOTHYROXINE SODIUM 88 UG/1
88 TABLET ORAL DAILY
Qty: 30 TABLET | Refills: 0 | Status: SHIPPED | OUTPATIENT
Start: 2018-04-23 | End: 2018-05-21

## 2018-04-23 NOTE — TELEPHONE ENCOUNTER
Called patient and relayed Dr Vieira message below.  Patient is our of state right now but when she comes back in May she will schedule an appointment.    Mirta Luque

## 2018-04-23 NOTE — TELEPHONE ENCOUNTER
She is due for appointment she has not had her TSH done in over a year.   Please have her make a lab only appointment.  I did another month refill.    Mindy Vieira D.O.

## 2018-04-23 NOTE — TELEPHONE ENCOUNTER
Patient is calling because Dr Augustin(patients prior PCP)  had prescribed 88mcg for patient. The script that was sent to pharmacy was for 75mcg.  Please send a new script to [facility] in [place].  See message below for address.    Any questions you can contact patient at [phone].    Mirta Luque

## 2018-04-23 NOTE — TELEPHONE ENCOUNTER
"Requested Prescriptions   Pending Prescriptions Disp Refills     levothyroxine (SYNTHROID/LEVOTHROID) 88 MCG tablet [Pharmacy Med Name: LEVOTHYROXINE 0.088MG (88MCG) TAB]  Last Written Prescription Date:  4/23/2018  Last Fill Quantity: 30 tabs,  # refills: 0   Last office visit: 10/16/2017 with prescribing provider:  Isha   Future Office Visit:     90 tablet 0     Sig: TAKE 1 TABLET(88 MCG) BY MOUTH DAILY    Thyroid Protocol Failed    4/23/2018  2:49 PM       Failed - Normal TSH on file in past 12 months    Recent Labs   Lab Test  06/16/16   1016   TSH  1.24             Passed - Patient is 12 years or older       Passed - Recent (12 mo) or future (30 days) visit within the authorizing provider's specialty    Patient had office visit in the last 12 months or has a visit in the next 30 days with authorizing provider or within the authorizing provider's specialty.  See \"Patient Info\" tab in inbasket, or \"Choose Columns\" in Meds & Orders section of the refill encounter.           Passed - No active pregnancy on record    If patient is pregnant or has had a positive pregnancy test, please check TSH.         Passed - No positive pregnancy test in past 12 months    If patient is pregnant or has had a positive pregnancy test, please check TSH.            "

## 2018-04-24 RX ORDER — LEVOTHYROXINE SODIUM 88 UG/1
TABLET ORAL
Qty: 90 TABLET | Refills: 0 | OUTPATIENT
Start: 2018-04-24

## 2018-05-15 ENCOUNTER — HOSPITAL ENCOUNTER (EMERGENCY)
Facility: CLINIC | Age: 63
Discharge: HOME | End: 2018-05-15
Payer: COMMERCIAL

## 2018-05-15 ENCOUNTER — TELEPHONE (OUTPATIENT)
Dept: FAMILY MEDICINE | Facility: CLINIC | Age: 63
End: 2018-05-15

## 2018-05-15 NOTE — TELEPHONE ENCOUNTER
Reason for Call:  Other appointment    Detailed comments: patient calling to schedule appointment for hospital follow up - TC scheduled appointment     Phone Number Patient can be reached at: Home number on file [phone] (home)    Best Time: any     Can we leave a detailed message on this number? YES    Call taken on 5/15/2018 at 11:09 AM by ZACK ZAYAS

## 2018-05-21 ENCOUNTER — OFFICE VISIT (OUTPATIENT)
Dept: FAMILY MEDICINE | Facility: CLINIC | Age: 63
End: 2018-05-21
Payer: COMMERCIAL

## 2018-05-21 ENCOUNTER — TELEPHONE (OUTPATIENT)
Dept: FAMILY MEDICINE | Facility: CLINIC | Age: 63
End: 2018-05-21

## 2018-05-21 VITALS
TEMPERATURE: 98.3 F | DIASTOLIC BLOOD PRESSURE: 64 MMHG | SYSTOLIC BLOOD PRESSURE: 110 MMHG | HEIGHT: 69 IN | WEIGHT: 173 LBS | BODY MASS INDEX: 25.62 KG/M2 | HEART RATE: 80 BPM

## 2018-05-21 DIAGNOSIS — G47.00 INSOMNIA, UNSPECIFIED TYPE: ICD-10-CM

## 2018-05-21 DIAGNOSIS — C49.9 LIPOSARCOMA (H): ICD-10-CM

## 2018-05-21 DIAGNOSIS — R42 VERTIGO: Primary | ICD-10-CM

## 2018-05-21 DIAGNOSIS — R42 VERTIGO: ICD-10-CM

## 2018-05-21 DIAGNOSIS — Z13.6 CARDIOVASCULAR SCREENING; LDL GOAL LESS THAN 130: ICD-10-CM

## 2018-05-21 DIAGNOSIS — K56.609 SBO (SMALL BOWEL OBSTRUCTION) (H): ICD-10-CM

## 2018-05-21 DIAGNOSIS — E03.9 HYPOTHYROIDISM, UNSPECIFIED TYPE: ICD-10-CM

## 2018-05-21 PROCEDURE — 99214 OFFICE O/P EST MOD 30 MIN: CPT | Performed by: FAMILY MEDICINE

## 2018-05-21 RX ORDER — PROCHLORPERAZINE MALEATE 10 MG
10 TABLET ORAL EVERY 6 HOURS PRN
Qty: 20 TABLET | Refills: 1 | Status: SHIPPED | OUTPATIENT
Start: 2018-05-21 | End: 2018-11-29

## 2018-05-21 RX ORDER — MECLIZINE HCL 25MG 25 MG/1
25 TABLET, CHEWABLE ORAL EVERY 6 HOURS PRN
COMMUNITY
End: 2019-06-03

## 2018-05-21 RX ORDER — LORAZEPAM 0.5 MG/1
0.5 TABLET ORAL EVERY 4 HOURS PRN
Qty: 30 TABLET | Refills: 3 | Status: CANCELLED | OUTPATIENT
Start: 2018-05-21

## 2018-05-21 RX ORDER — LEVOTHYROXINE SODIUM 88 UG/1
88 TABLET ORAL DAILY
Qty: 30 TABLET | Refills: 0 | Status: SHIPPED | OUTPATIENT
Start: 2018-05-21 | End: 2018-08-16

## 2018-05-21 RX ORDER — TRAZODONE HYDROCHLORIDE 50 MG/1
50 TABLET, FILM COATED ORAL
Qty: 30 TABLET | Refills: 1 | Status: CANCELLED | OUTPATIENT
Start: 2018-05-21

## 2018-05-21 NOTE — PATIENT INSTRUCTIONS
You can take 1/2 of a trazodone to help you sleep.    Do scar massage for prevention.    You can try the heating pad for your bowel obstructions.    If you can, it is helpful if you fill out a survey about your clinic visit if it is mailed to your house in a few weeks.

## 2018-05-21 NOTE — TELEPHONE ENCOUNTER
"Requested Prescriptions   Pending Prescriptions Disp Refills     prochlorperazine (COMPAZINE) 10 MG tablet [Pharmacy Med Name: PROCHLORPERAZINE 10MG TABLETS]  Last Written Prescription Date:  5/21/2018  Last Fill Quantity: 20 tabs,  # refills: 1   Last office visit: No previous visit found with prescribing provider:  Isha   Future Office Visit:     360 tablet 1     Sig: TAKE 1 TABLET(10 MG) BY MOUTH EVERY 6 HOURS AS NEEDED FOR NAUSEA OR VOMITING     Antivertigo/Antiemetic Agents Passed    5/21/2018  2:29 PM       Passed - Recent (12 mo) or future (30 days) visit within the authorizing provider's specialty    Patient had office visit in the last 12 months or has a visit in the next 30 days with authorizing provider or within the authorizing provider's specialty.  See \"Patient Info\" tab in inbasket, or \"Choose Columns\" in Meds & Orders section of the refill encounter.           Passed - Patient is 18 years of age or older          "

## 2018-05-21 NOTE — PROGRESS NOTES
SUBJECTIVE:   Shiela Hewitt is a 62 year old female who presents to clinic today for the following health issues:          Hospital Follow-up Visit:    Hospital/Nursing Home/IP Rehab Facility: [facility]   Date of Admission: 5/12/18  Date of Discharge: 5/14/18  Reason(s) for Admission: Dizziness             Problems taking medications regularly:  None       Medication changes since discharge: Meclozine and Zofran        Problems adhering to non-medication therapy:  Zofran - causes headaches, not able to take.     Summary of hospitalization:  CareEverywhere information obtained and reviewed - could not find discharge summary.  Diagnostic Tests/Treatments reviewed.  Follow up needed: none  Other Healthcare Providers Involved in Patient s Care:         None  Update since discharge: improved.     Post Discharge Medication Reconciliation: discharge medications reconciled, continue medications without change.  Plan of care communicated with patient     Coding guidelines for this visit:  Type of Medical   Decision Making Face-to-Face Visit       within 7 Days of discharge Face-to-Face Visit        within 14 days of discharge   Moderate Complexity [id number]   High Complexity [id number]          Patient was referred to GI when she was seen in October. She did not go because she talked to her oncologist and he agreed that there wasn't much GI could do. She has a history of small bowel obstruction and hemicolectomy. She had slightly low potassium in the ED at 3.3. She was given IV fluids. She was in the ED the first time for dizziness, she was told this was vestibular neuritis. She had Benign positional vertigo in the past, and this was different from that. She is not dizzy anymore, but she is still hearing sounds and has lost hearing in her left ear. She was referred to the [facility], but this is out of network for her. She will see ENT later today.     Abdominal Pain:  She states that she was in the hospital  "in [place] for 3 days due to small bowel obstruction on 5/15. She states that it is a very specific pain. She was given IV fluids, and no food for about 3 days, and it resolved.    Sleep:  She states that she had some leftover ativan from a previous prescription, so she was using them periodically for sleep. She has been trying to get to sleep on her own. She has not been using Trazodone because she is concerned that it is a higher mg amount than Ativan.    Hypothyroidism Follow-up      Since last visit, patient describes the following symptoms: Weight stable, no hair loss, no skin changes, no constipation, no loose stools      Problem list and histories reviewed & adjusted, as indicated.  Additional history: as documented    BP Readings from Last 3 Encounters:   05/21/18 110/64   02/22/18 107/70   10/18/17 130/68    Wt Readings from Last 3 Encounters:   05/21/18 173 lb (78.5 kg)   02/22/18 177 lb 6.4 oz (80.5 kg)   10/18/17 176 lb 14.4 oz (80.2 kg)                    Reviewed and updated as needed this visit by clinical staff  Tobacco  Allergies  Meds  Med Hx  Surg Hx  Fam Hx  Soc Hx      Reviewed and updated as needed this visit by Provider         ROS:  Constitutional, HEENT, cardiovascular, pulmonary, gi and gu systems are negative, except as otherwise noted.    This document serves as a record of the services and decisions personally performed by HALEY ROY. It was created on his/her behalf by Misa De Jesus, a trained medical scribe. The creation of this document is based on the provider's statements to the medical scribe. Misa De Jesus, May 21, 2018 1:48 PM    OBJECTIVE:     /64 (Cuff Size: Adult Large)  Pulse 80  Temp 98.3  F (36.8  C) (Oral)  Ht 5' 8.5\" (1.74 m)  Wt 173 lb (78.5 kg)  BMI 25.92 kg/m2  Body mass index is 25.92 kg/(m^2).  GENERAL: healthy, alert and no distress  EYES: Nystagmus to the left. Eyes grossly normal to inspection, PERRL and conjunctivae and sclerae normal  HENT: ear " canals and TM's normal, nose and mouth without ulcers or lesions  NECK: no adenopathy, no asymmetry, masses, or scars and thyroid normal to palpation  RESP: lungs clear to auscultation - no rales, rhonchi or wheezes  CV: regular rate and rhythm, normal S1 S2, no S3 or S4, no murmur, click or rub, no peripheral edema and peripheral pulses strong  ABDOMEN: soft, nontender, no hepatosplenomegaly, no masses and bowel sounds normal  MS: no gross musculoskeletal defects noted, no edema  SKIN: no suspicious lesions or rashes  NEURO: Normal strength and tone, mentation intact and speech normal  PSYCH: mentation appears normal, affect normal/bright    Diagnostic Test Results:  No results found for this or any previous visit (from the past 24 hour(s)).    ASSESSMENT/PLAN:     Hypothyroidism; controlled/euthyroid   Plan:  No changes in the patient's current treatment plan  Labs:  TSH and Free T4        ICD-10-CM    1. Vertigo R42 prochlorperazine (COMPAZINE) 10 MG tablet   2. Hypothyroidism, unspecified type E03.9 TSH WITH FREE T4 REFLEX     levothyroxine (SYNTHROID/LEVOTHROID) 88 MCG tablet   3. SBO (small bowel obstruction) K56.609    4. Liposarcoma (H) C49.9    5. Insomnia, unspecified type G47.00    6. CARDIOVASCULAR SCREENING; LDL GOAL LESS THAN 130 Z13.6 Lipid panel reflex to direct LDL Fasting       Patient will complete labs on the 18th of June when her port is accessed. I will adjust treatment based on labs. I suggested scar massage for small bowel obstruction prevention. I prescribed the patient compazine to have on hand to control her vertigo. She is seeing ENT today.  I think this sounds more like meniere disease than vestibular neuritis.  She will follow up with me in 6 months for chronic care.     Patient Instructions   You can take 1/2 of a trazodone to help you sleep.    Do scar massage for prevention.    You can try the heating pad for your bowel obstructions.    If you can, it is helpful if you fill out a  survey about your clinic visit if it is mailed to your house in a few weeks.          Mindy Vieira, DO  [facility]    The information in this document, created by the medical scribe Misa De Jesus for me, accurately reflects the services I personally performed and the decisions made by me. I have reviewed and approved this document for accuracy prior to leaving the patient care area.

## 2018-05-21 NOTE — MR AVS SNAPSHOT
After Visit Summary   5/21/2018    Shiela Hewitt    MRN: [id number]           Patient Information     Date Of Birth          1955        Visit Information        Provider Department      5/21/2018 1:00 PM Mindy Vieira DO [facility]        Today's Diagnoses     CARDIOVASCULAR SCREENING; LDL GOAL LESS THAN 130    -  1    Hypothyroidism, unspecified type        Liposarcoma (H)        Insomnia, unspecified type        Vertigo          Care Instructions    You can take 1/2 of a trazodone to help you sleep.    Do scar massage for prevention.    You can try the heating pad for your bowel obstructions.    If you can, it is helpful if you fill out a survey about your clinic visit if it is mailed to your house in a few weeks.              Follow-ups after your visit        Your next 10 appointments already scheduled     Jun 18, 2018 10:15 AM CDT   [facility] Draw with  [facility]   [facility] ([facility])    [address]  [address]  [facility] [id number] [phone]            Jun 18, 2018 10:40 AM CDT   CT CHEST ABDOMEN PELVIS W/O & W CONTRAST with CT1   [facility] CT ([facility])    [address]  1st Floor  [facility] [id number] [phone]           Please bring any scans or X-rays taken at other hospitals, if similar tests were done. Also bring a list of your medicines, including vitamins, minerals and over-the-counter drugs. It is safest to leave personal items at home.  Be sure to tell your doctor:   If you have any allergies.   If there s any chance you are pregnant.   If you are breastfeeding.  How to prepare:   Do not eat or drink for 2 hours before your exam. If you need to take medicine, you may take it with small sips of water. (We may ask you to take liquid medicine as well.)   Please wear loose clothing, such as a sweat suit or jogging clothes. Avoid  snaps, zippers and other metal. We may ask you to undress and put on a hospital gown.  Please arrive 30 minutes early for your CT. Once in the department you might be asked to drink water 15-20 minutes prior to your exam.  If indicated you may be asked to drink an oral contrast in advance of your CT.  If this is the case, the imaging team will let you know or be in contact with you prior to your appointment  Patients over 70 or patients with diabetes or kidney problems:   If you haven t had a blood test (creatinine test) within the last 30 days, the Cardiologist/Radiologist may require you to get this test prior to your exam.  If you have diabetes:   Continue to take your metformin medication on the day of your exam  If you have any questions, please call the Imaging Department where you will have your exam.            Jun 20, 2018 11:00 AM CDT   (Arrive by 10:45 AM)   Return Visit with Rob Wiseman MD   [facility] ([facility])    [address] [place] [address]  [address]  [facility] [address] [phone]            Nov 29, 2018 10:00 AM CST   (Arrive by 9:45 AM)   Return Visit with Rob Wiseman MD   [facility])    [address] [place] [address]  [address]  [facility] [address] [phone]              Future tests that were ordered for you today     Open Future Orders        Priority Expected Expires Ordered    Lipid panel reflex to direct LDL Fasting Routine  7/21/2018 5/21/2018    TSH WITH FREE T4 REFLEX Routine  7/21/2018 5/21/2018            Who to contact     If you have questions or need follow up information about today's clinic visit or your schedule please contact [facility] directly at [phone].  Normal or non-critical lab and imaging results will be communicated to you by MyChart, letter or phone within 4 business days after the clinic has received the results. If  "you do not hear from us within 7 days, please contact the clinic through [employer] or phone. If you have a critical or abnormal lab result, we will notify you by phone as soon as possible.  Submit refill requests through [employer] or call your pharmacy and they will forward the refill request to us. Please allow 3 business days for your refill to be completed.          Additional Information About Your Visit        [employer] Information     [employer] gives you secure access to your electronic health record. If you see a primary care provider, you can also send messages to your care team and make appointments. If you have questions, please call your primary care clinic.  If you do not have a primary care provider, please call [phone] and they will assist you.        Care EveryWhere ID     This is your Care EveryWhere ID. This could be used by other organizations to access your [place] medical records  [id number]        Your Vitals Were     Pulse Temperature Height BMI (Body Mass Index)          80 98.3  F (36.8  C) (Oral) 5' 8.5\" (1.74 m) 25.92 kg/m2         Blood Pressure from Last 3 Encounters:   05/21/18 110/64   02/22/18 107/70   10/18/17 130/68    Weight from Last 3 Encounters:   05/21/18 173 lb (78.5 kg)   02/22/18 177 lb 6.4 oz (80.5 kg)   10/18/17 176 lb 14.4 oz (80.2 kg)                 Today's Medication Changes          These changes are accurate as of 5/21/18  1:58 PM.  If you have any questions, ask your nurse or doctor.               Start taking these medicines.        Dose/Directions    prochlorperazine 10 MG tablet   Commonly known as:  COMPAZINE   Used for:  Vertigo   Started by:  Mindy Vieira DO        Dose:  10 mg   Take 1 tablet (10 mg) by mouth every 6 hours as needed for nausea or vomiting   Quantity:  20 tablet   Refills:  1            Where to get your medicines      These medications were sent to [facility] [address] AT [facility]  " [address] [address]     Phone:  [phone]     levothyroxine 88 MCG tablet    prochlorperazine 10 MG tablet                Primary Care Provider Office Phone # Fax #    Mindy Vieira [phone] [address] [facility] [address]        Equal Access to Services     GREG CARR : Hadii aad ku hadasho Soomaali, waaxda luqadaha, qaybta kaalmada adeegyada, waxay idiin hayaan adeeg kharash laliana . So [facility] [phone].    ATENCIÓN: Si habla español, tiene a webb disposición servicios gratuitos de asistencia lingüística. Yaeltyson al [phone].    We comply with applicable federal civil rights laws and [place] laws. We do not discriminate on the basis of race, color, national origin, age, disability, sex, sexual orientation, or gender identity.            Thank you!     Thank you for choosing [facility]  for your care. Our goal is always to provide you with excellent care. Hearing back from our patients is one way we can continue to improve our services. Please take a few minutes to complete the written survey that you may receive in the mail after your visit with us. Thank you!             Your Updated Medication List - Protect others around you: Learn how to safely use, store and throw away your medicines at www.disposemymeds.org.          This list is accurate as of 5/21/18  1:58 PM.  Always use your most recent med list.                   Brand Name Dispense Instructions for use Diagnosis    aspirin 81 MG chewable tablet      Take 81 mg by mouth daily    Liposarcoma of retroperitoneum (H), Hypothyroidism, unspecified type, Antiphospholipid antibody syndrome (H), Gastroesophageal reflux disease, esophagitis presence not specified, History of herpes genitalis, Displacement of lumbar intervertebral disc without myelopathy       levothyroxine 88 MCG tablet    SYNTHROID/LEVOTHROID    30 tablet    Take 1 tablet (88 mcg) by mouth daily     Hypothyroidism, unspecified type       loperamide 2 MG capsule    IMODIUM     Take 2 mg by mouth every 48 hours        LORazepam 0.5 MG tablet    ATIVAN    30 tablet    Take 1 tablet (0.5 mg) by mouth every 4 hours as needed (Anxiety, Nausea/Vomiting or Sleep)    Liposarcoma (H)       meclizine 25 MG Chew      Take 25 mg by mouth every 6 hours as needed for dizziness        methylPREDNISolone 32 MG tablet    MEDROL    2 tablet    Take 1 tablet (32 mg) by mouth daily Take 1 tablet 12 hours before CT scan, take the other tablet 2 hours before CT scan    Liposarcoma of retroperitoneum (H)       MULTIVITAMIN GUMMIES ADULT PO      Take 2 chew tab by mouth daily        omeprazole 40 MG capsule    priLOSEC    30 capsule    Take 1 capsule (40 mg) by mouth daily    Gastroesophageal reflux disease without esophagitis       prochlorperazine 10 MG tablet    COMPAZINE    20 tablet    Take 1 tablet (10 mg) by mouth every 6 hours as needed for nausea or vomiting    Vertigo       RESTASIS OP      Apply 1 drop to eye 2 times daily        traZODone 50 MG tablet    DESYREL    30 tablet    Take 1 tablet (50 mg) by mouth nightly as needed for sleep    Insomnia, unspecified type

## 2018-05-22 NOTE — TELEPHONE ENCOUNTER
90 day supply not appropriate as this should not be taken long term and problem should improve with time.     Mindy Vieira D.O.

## 2018-05-23 RX ORDER — PROCHLORPERAZINE MALEATE 10 MG
TABLET ORAL
Qty: 360 TABLET | Refills: 1 | OUTPATIENT
Start: 2018-05-23

## 2018-06-17 ASSESSMENT — ENCOUNTER SYMPTOMS
EYE IRRITATION: 1
DIZZINESS: 1
EYE WATERING: 1
ABDOMINAL PAIN: 1
DIARRHEA: 1

## 2018-06-18 ENCOUNTER — RADIANT APPOINTMENT (OUTPATIENT)
Dept: CT IMAGING | Facility: CLINIC | Age: 63
End: 2018-06-18
Attending: INTERNAL MEDICINE
Payer: COMMERCIAL

## 2018-06-18 DIAGNOSIS — Z13.6 CARDIOVASCULAR SCREENING; LDL GOAL LESS THAN 130: ICD-10-CM

## 2018-06-18 DIAGNOSIS — M51.26 DISPLACEMENT OF LUMBAR INTERVERTEBRAL DISC WITHOUT MYELOPATHY: ICD-10-CM

## 2018-06-18 DIAGNOSIS — K21.9 GASTROESOPHAGEAL REFLUX DISEASE, ESOPHAGITIS PRESENCE NOT SPECIFIED: ICD-10-CM

## 2018-06-18 DIAGNOSIS — D68.61 ANTIPHOSPHOLIPID ANTIBODY SYNDROME (H): ICD-10-CM

## 2018-06-18 DIAGNOSIS — E06.3 HYPOTHYROIDISM DUE TO HASHIMOTO'S THYROIDITIS: ICD-10-CM

## 2018-06-18 DIAGNOSIS — E03.9 HYPOTHYROIDISM, UNSPECIFIED TYPE: ICD-10-CM

## 2018-06-18 DIAGNOSIS — C48.0 LIPOSARCOMA OF RETROPERITONEUM (H): ICD-10-CM

## 2018-06-18 LAB
ALBUMIN SERPL-MCNC: 4 G/DL (ref 3.4–5)
ALP SERPL-CCNC: 79 U/L (ref 40–150)
ALT SERPL W P-5'-P-CCNC: 31 U/L (ref 0–50)
ANION GAP SERPL CALCULATED.3IONS-SCNC: 10 MMOL/L (ref 3–14)
AST SERPL W P-5'-P-CCNC: 19 U/L (ref 0–45)
BASOPHILS # BLD AUTO: 0 10E9/L (ref 0–0.2)
BASOPHILS NFR BLD AUTO: 0.1 %
BILIRUB SERPL-MCNC: 0.6 MG/DL (ref 0.2–1.3)
BUN SERPL-MCNC: 21 MG/DL (ref 7–30)
CALCIUM SERPL-MCNC: 9.3 MG/DL (ref 8.5–10.1)
CHLORIDE SERPL-SCNC: 106 MMOL/L (ref 94–109)
CHOLEST SERPL-MCNC: 168 MG/DL
CO2 SERPL-SCNC: 24 MMOL/L (ref 20–32)
CREAT SERPL-MCNC: 0.94 MG/DL (ref 0.52–1.04)
DIFFERENTIAL METHOD BLD: ABNORMAL
EOSINOPHIL # BLD AUTO: 0 10E9/L (ref 0–0.7)
EOSINOPHIL NFR BLD AUTO: 0 %
ERYTHROCYTE [DISTWIDTH] IN BLOOD BY AUTOMATED COUNT: 12.9 % (ref 10–15)
GFR SERPL CREATININE-BSD FRML MDRD: 60 ML/MIN/1.7M2
GLUCOSE SERPL-MCNC: 123 MG/DL (ref 70–99)
HCT VFR BLD AUTO: 40.5 % (ref 35–47)
HDLC SERPL-MCNC: 87 MG/DL
HGB BLD-MCNC: 13.7 G/DL (ref 11.7–15.7)
IMM GRANULOCYTES # BLD: 0 10E9/L (ref 0–0.4)
IMM GRANULOCYTES NFR BLD: 0.3 %
LDLC SERPL CALC-MCNC: 68 MG/DL
LYMPHOCYTES # BLD AUTO: 0.5 10E9/L (ref 0.8–5.3)
LYMPHOCYTES NFR BLD AUTO: 6.5 %
MCH RBC QN AUTO: 33.8 PG (ref 26.5–33)
MCHC RBC AUTO-ENTMCNC: 33.8 G/DL (ref 31.5–36.5)
MCV RBC AUTO: 100 FL (ref 78–100)
MONOCYTES # BLD AUTO: 0 10E9/L (ref 0–1.3)
MONOCYTES NFR BLD AUTO: 0.5 %
NEUTROPHILS # BLD AUTO: 6.7 10E9/L (ref 1.6–8.3)
NEUTROPHILS NFR BLD AUTO: 92.6 %
NONHDLC SERPL-MCNC: 80 MG/DL
NRBC # BLD AUTO: 0 10*3/UL
NRBC BLD AUTO-RTO: 0 /100
PLATELET # BLD AUTO: 165 10E9/L (ref 150–450)
POTASSIUM SERPL-SCNC: 3.9 MMOL/L (ref 3.4–5.3)
PROT SERPL-MCNC: 7.3 G/DL (ref 6.8–8.8)
RBC # BLD AUTO: 4.05 10E12/L (ref 3.8–5.2)
SODIUM SERPL-SCNC: 140 MMOL/L (ref 133–144)
TRIGL SERPL-MCNC: 59 MG/DL
TSH SERPL DL<=0.005 MIU/L-ACNC: 0.54 MU/L (ref 0.4–4)
WBC # BLD AUTO: 7.3 10E9/L (ref 4–11)

## 2018-06-18 PROCEDURE — 80061 LIPID PANEL: CPT | Performed by: INTERNAL MEDICINE

## 2018-06-18 PROCEDURE — 80053 COMPREHEN METABOLIC PANEL: CPT | Performed by: INTERNAL MEDICINE

## 2018-06-18 PROCEDURE — 85025 COMPLETE CBC W/AUTO DIFF WBC: CPT | Performed by: INTERNAL MEDICINE

## 2018-06-18 PROCEDURE — 84443 ASSAY THYROID STIM HORMONE: CPT | Performed by: INTERNAL MEDICINE

## 2018-06-18 PROCEDURE — 25000128 H RX IP 250 OP 636: Performed by: INTERNAL MEDICINE

## 2018-06-18 RX ORDER — IOPAMIDOL 755 MG/ML
105 INJECTION, SOLUTION INTRAVASCULAR ONCE
Status: COMPLETED | OUTPATIENT
Start: 2018-06-18 | End: 2018-06-18

## 2018-06-18 RX ORDER — HEPARIN SODIUM (PORCINE) LOCK FLUSH IV SOLN 100 UNIT/ML 100 UNIT/ML
500 SOLUTION INTRAVENOUS ONCE
Status: COMPLETED | OUTPATIENT
Start: 2018-06-18 | End: 2018-06-18

## 2018-06-18 RX ORDER — HEPARIN SODIUM (PORCINE) LOCK FLUSH IV SOLN 100 UNIT/ML 100 UNIT/ML
5 SOLUTION INTRAVENOUS
Status: COMPLETED | OUTPATIENT
Start: 2018-06-18 | End: 2018-06-18

## 2018-06-18 RX ADMIN — HEPARIN SODIUM (PORCINE) LOCK FLUSH IV SOLN 100 UNIT/ML 500 UNITS: 100 SOLUTION at 11:13

## 2018-06-18 RX ADMIN — IOPAMIDOL 105 ML: 755 INJECTION, SOLUTION INTRAVASCULAR at 11:08

## 2018-06-18 RX ADMIN — SODIUM CHLORIDE, PRESERVATIVE FREE 5 ML: 5 INJECTION INTRAVENOUS at 10:42

## 2018-06-18 NOTE — NURSING NOTE
"Chief Complaint   Patient presents with     Labs Only     labs drawn from port by rn.       Port accessed with 20 gauge 3/4\" Power needle and labs drawn by rn.  Port flushed with NS and heparin.  Pt tolerated well.    Otilia Montano RN      "

## 2018-06-18 NOTE — DISCHARGE INSTRUCTIONS

## 2018-06-20 ENCOUNTER — ONCOLOGY VISIT (OUTPATIENT)
Dept: ONCOLOGY | Facility: CLINIC | Age: 63
End: 2018-06-20
Attending: INTERNAL MEDICINE
Payer: COMMERCIAL

## 2018-06-20 VITALS
SYSTOLIC BLOOD PRESSURE: 109 MMHG | BODY MASS INDEX: 25.77 KG/M2 | TEMPERATURE: 97 F | OXYGEN SATURATION: 98 % | RESPIRATION RATE: 16 BRPM | WEIGHT: 174 LBS | DIASTOLIC BLOOD PRESSURE: 72 MMHG | HEART RATE: 70 BPM | HEIGHT: 69 IN

## 2018-06-20 DIAGNOSIS — K21.9 GASTROESOPHAGEAL REFLUX DISEASE, ESOPHAGITIS PRESENCE NOT SPECIFIED: ICD-10-CM

## 2018-06-20 DIAGNOSIS — M51.26 DISPLACEMENT OF LUMBAR INTERVERTEBRAL DISC WITHOUT MYELOPATHY: ICD-10-CM

## 2018-06-20 DIAGNOSIS — H81.10 BENIGN PAROXYSMAL POSITIONAL VERTIGO, UNSPECIFIED LATERALITY: ICD-10-CM

## 2018-06-20 DIAGNOSIS — D68.61 ANTIPHOSPHOLIPID ANTIBODY SYNDROME (H): ICD-10-CM

## 2018-06-20 DIAGNOSIS — C48.0 LIPOSARCOMA OF RETROPERITONEUM (H): ICD-10-CM

## 2018-06-20 DIAGNOSIS — E03.4 HYPOTHYROIDISM DUE TO ACQUIRED ATROPHY OF THYROID: ICD-10-CM

## 2018-06-20 PROCEDURE — G0463 HOSPITAL OUTPT CLINIC VISIT: HCPCS | Mod: ZF

## 2018-06-20 PROCEDURE — 99214 OFFICE O/P EST MOD 30 MIN: CPT | Mod: ZP | Performed by: INTERNAL MEDICINE

## 2018-06-20 RX ORDER — VALACYCLOVIR HYDROCHLORIDE 500 MG/1
500 TABLET, FILM COATED ORAL PRN
COMMUNITY

## 2018-06-20 RX ORDER — METHYLPREDNISOLONE 32 MG/1
32 TABLET ORAL DAILY
Qty: 2 TABLET | Refills: 3 | Status: SHIPPED | OUTPATIENT
Start: 2018-06-20 | End: 2019-06-03

## 2018-06-20 ASSESSMENT — PAIN SCALES - GENERAL: PAINLEVEL: NO PAIN (0)

## 2018-06-20 NOTE — LETTER
6/20/2018       RE: Shiela Hewitt  [address] [place] [address]     Dear Colleague,    Thank you for referring your patient, Shiela Hewitt, to the [facility]. Please see a copy of my visit note below.    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   -  Background  In brief, she noted an abdominal mass at the end of 2013, which was quite large and was resected on 01/28/2014. This tumor was about 26 cm in greatest diameter, high-grade, with necrosis present. In retrospect, she had had a CT scan done about 2 years before that, which was felt to be negative. In 08/2014, repeat imaging revealed a 3.5 cm mass near the psoas, and she received preoperative radiation therapy for this. 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 A new margin was obtained, but this was still positive for liposarcoma. All visible tumor was removed.      A recurrence was noted in the abd and she began lipodox/ifos about 3-19-15 and had 6 cycles; the last in August 2015.  -        Interval history    Sandro is doing very well except she developed noise in her R ear requiring hospitalization.  She was also admitted for partial SBO. She is now off imodium. She has some loose stool and she takes Imodium for it sometimes    She got a short course of prednisone; she may get a steroid injection next week.  She has some vertigo w this intermittently.    She usually stays in [place] from January through May.   She got back 5-11.    Right thigh is also numb since the second surgery.  She has some weakness in the right leg but is getting better and if that does not affect her life.   She can walk a mile at least with no problem.    She has occ GERD.  She is off omeprazole.      Her face was red yesterday; today it is back to normal.     She plans to drive back to [place] after today's visit.      She has a  "longstanding history of hypothyroidism on replacement T4, and some seasonal allergies. She was also noted to have an antiphospholipid antibody. This was checked because her sister presented with a mesenteric artery clot. The patient herself was on 81 mg of aspirin a day in this regard, and has not had any problems.     Her 10-point ROS is otherwise unremarkable     -  Background PMH, FH, SH  PAST MEDICAL HISTORY: She did have a tonsillectomy at age 15 and some sinus surgery in about 1994 along with a tubal pregnancy and a tubal ligation.   ALLERGIES: She does describe 1 hive after IV contrast material about 20 years ago, so she has been taking prednisone and Benadryl for contrast scans. She does have some itching with Dilaudid, but still uses it.   SOCIAL HISTORY: She is a never smoker and does not abuse alcohol or other drugs. 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  -          On exam she appeared comfortable with normal affect.   /72  Pulse 70  Temp 97  F (36.1  C) (Oral)  Resp 16  Ht 1.74 m (5' 8.5\")  Wt 78.9 kg (174 lb)  SpO2 98%  BMI 26.07 kg/m2  HEENT No icterus.   NECK No thyromegaly or neck mass.  CHEST clear chest.   CV RRR w no sig murmur.  LYMPH No lymphadenopathy.  ABD No HSMT.  NEURO AAO X3, Romberg and heel walk OK.  EXT No edema.    PSYCH Mood good.      -  CBC, LFT, GNE OK.     -   It was felt that on her outside imaging pre chemo that there seemed to be a recurrence in the abd; this was a complicated image. This region on the psoas seemed a bit more prominent on 3-14 c/w Feb, and on the 3-31 is less prominent. We dont have a biopsy of this but the series was c/w tumor response. The CT of ay 2015 showed some colitis.     I reviewed her new CT images today and report and there is no clear PD and that is the formal read.     -  A/P   We discussed a number of issues. She had facial redness after the scan (typically the next day) but it " did not bother her much so we will not change anything in that regard next time. She has steroid for the next scan.    She is almost 3 years post Chemotherapy and is doing well.  We will plan to restage iabout 11-29 and then when she comes back from [place].      All questions were addressed and she will call if other questions arise.     Rob Wiseman M.D.  Professor  Hematology, Oncology and Transplantation    Again, thank you for allowing me to participate in the care of your patient.      Sincerely,    Rob Wiseman MD

## 2018-06-20 NOTE — PROGRESS NOTES
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   -  Background  In brief, she noted an abdominal mass at the end of 2013, which was quite large and was resected on 01/28/2014. This tumor was about 26 cm in greatest diameter, high-grade, with necrosis present. In retrospect, she had had a CT scan done about 2 years before that, which was felt to be negative. In 08/2014, repeat imaging revealed a 3.5 cm mass near the psoas, and she received preoperative radiation therapy for this. 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 A new margin was obtained, but this was still positive for liposarcoma. All visible tumor was removed.      A recurrence was noted in the abd and she began lipodox/ifos about 3-19-15 and had 6 cycles; the last in August 2015.  -        Interval history    Sandro is doing very well except she developed noise in her R ear requiring hospitalization.  She was also admitted for partial SBO. She is now off imodium. She has some loose stool and she takes Imodium for it sometimes    She got a short course of prednisone; she may get a steroid injection next week.  She has some vertigo w this intermittently.    She usually stays in [place] from January through May.  She got back 5-11.    Right thigh is also numb since the second surgery.  She has some weakness in the right leg but is getting better and if that does not affect her life.  She can walk a mile at least with no problem.    She has occ GERD.  She is off omeprazole.      Her face was red yesterday; today it is back to normal.     She plans to drive back to [place] after today's visit.      She has a longstanding history of hypothyroidism on replacement T4, and some seasonal allergies. She was also noted to have an antiphospholipid antibody. This was checked because her sister presented with a mesenteric artery clot. The patient herself  "was on 81 mg of aspirin a day in this regard, and has not had any problems.     Her 10-point ROS is otherwise unremarkable     -  Background PMH, FH, SH  PAST MEDICAL HISTORY: She did have a tonsillectomy at age 15 and some sinus surgery in about 1994 along with a tubal pregnancy and a tubal ligation.   ALLERGIES: She does describe 1 hive after IV contrast material about 20 years ago, so she has been taking prednisone and Benadryl for contrast scans. She does have some itching with Dilaudid, but still uses it.   SOCIAL HISTORY: She is a never smoker and does not abuse alcohol or other drugs. 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  -          On exam she appeared comfortable with normal affect.   /72  Pulse 70  Temp 97  F (36.1  C) (Oral)  Resp 16  Ht 1.74 m (5' 8.5\")  Wt 78.9 kg (174 lb)  SpO2 98%  BMI 26.07 kg/m2  HEENT No icterus.   NECK No thyromegaly or neck mass.  CHEST clear chest.   CV RRR w no sig murmur.  LYMPH No lymphadenopathy.  ABD No HSMT.  NEURO AAO X3, Romberg and heel walk OK.  EXT No edema.    PSYCH Mood good.      -  CBC, LFT, GNE OK.     -   It was felt that on her outside imaging pre chemo that there seemed to be a recurrence in the abd; this was a complicated image. This region on the psoas seemed a bit more prominent on 3-14 c/w Feb, and on the 3-31 is less prominent. We dont have a biopsy of this but the series was c/w tumor response. The CT of ay 2015 showed some colitis.     I reviewed her new CT images today and report and there is no clear PD and that is the formal read.     -  A/P   We discussed a number of issues. She had facial redness after the scan (typically the next day) but it did not bother her much so we will not change anything in that regard next time. She has steroid for the next scan.    She is almost 3 years post Chemotherapy and is doing well.  We will plan to restage iabout 11-29 and then when she comes " back from [place].      All questions were addressed and she will call if other questions arise.     Rob Wiseman M.D.  Professor  Hematology, Oncology and Transplantation

## 2018-06-20 NOTE — MR AVS SNAPSHOT
After Visit Summary   6/20/2018    Shiela Hewitt    MRN: [id number]           Patient Information     Date Of Birth          1955        Visit Information        Provider Department      6/20/2018 11:00 AM Rob Wiseman MD [facility]        Today's Diagnoses     Liposarcoma of retroperitoneum (H)        Hypothyroidism due to acquired atrophy of thyroid        Antiphospholipid antibody syndrome (H)        Benign paroxysmal positional vertigo, unspecified laterality        Gastroesophageal reflux disease, esophagitis presence not specified        Displacement of lumbar intervertebral disc without myelopathy           Follow-ups after your visit        Your next 10 appointments already scheduled     Nov 27, 2018  9:15 AM CST   LAB with  LAB   [facility] ([facility])    [address] [place] [id number] [phone]           Please do not eat 10-12 hours before your appointment if you are coming in fasting for labs on lipids, cholesterol, or glucose (sugar). This does not apply to pregnant women. Water, hot tea and black coffee (with nothing added) are okay. Do not drink other fluids, diet soda or chew gum.            Nov 27, 2018 10:00 AM CST   CT CHEST ABDOMEN PELVIS W/O & W CONTRAST with UCCT2   [facility] CT ([facility])    [address] [place] [address] [phone]           Please bring any scans or X-rays taken at other hospitals, if similar tests were done. Also bring a list of your medicines, including vitamins, minerals and over-the-counter drugs. It is safest to leave personal items at home.  Be sure to tell your doctor:   If you have any allergies.   If there s any chance you are pregnant.   If you are breastfeeding.  How to prepare:   Do not eat or drink for 2 hours before your exam. If you need to take medicine, you may take it with  small sips of water. (We may ask you to take liquid medicine as well.)   Please wear loose clothing, such as a sweat suit or jogging clothes. Avoid snaps, zippers and other metal. We may ask you to undress and put on a hospital gown.  Please arrive 30 minutes early for your CT. Once in the department you might be asked to drink water 15-20 minutes prior to your exam.  If indicated you may be asked to drink an oral contrast in advance of your CT.  If this is the case, the imaging team will let you know or be in contact with you prior to your appointment  Patients over 70 or patients with diabetes or kidney problems:   If you haven t had a blood test (creatinine test) within the last 30 days, the Cardiologist/Radiologist may require you to get this test prior to your exam.  If you have diabetes:   Continue to take your metformin medication on the day of your exam  If you have any questions, please call the Imaging Department where you will have your exam.            Nov 29, 2018 10:00 AM CST   (Arrive by 9:45 AM)   Return Visit with Rob Wiseman MD   [facility] ([facility])    [address] [place] [address]  [address]  [facility] [address] [phone]            May 28, 2019 10:00 AM CDT   (Arrive by 9:45 AM)   Return Visit with Rob Wiseman MD   [facility] ([facility])    [address] [place] [address]  [address]  [facility] [address] [phone]              Future tests that were ordered for you today     Open Future Orders        Priority Expected Expires Ordered    CT Chest Abdomen Pelvis w/o & w Contrast Routine 11/27/2018 4/18/2019 6/20/2018            Who to contact     If you have questions or need follow up information about today's clinic visit or your schedule please contact [facility] directly at [phone].  Normal or non-critical lab and imaging results will be communicated  "to you by [employer], letter or phone within 4 business days after the clinic has received the results. If you do not hear from us within 7 days, please contact the clinic through [employer] or phone. If you have a critical or abnormal lab result, we will notify you by phone as soon as possible.  Submit refill requests through [employer] or call your pharmacy and they will forward the refill request to us. Please allow 3 business days for your refill to be completed.          Additional Information About Your Visit        [employer] Information     [employer] gives you secure access to your electronic health record. If you see a primary care provider, you can also send messages to your care team and make appointments. If you have questions, please call your primary care clinic.  If you do not have a primary care provider, please call [phone] and they will assist you.        Care EveryWhere ID     This is your Care EveryWhere ID. This could be used by other organizations to access your [place] medical records  [id number]        Your Vitals Were     Pulse Temperature Respirations Height Pulse Oximetry BMI (Body Mass Index)    70 97  F (36.1  C) (Oral) 16 1.74 m (5' 8.5\") 98% 26.07 kg/m2       Blood Pressure from Last 3 Encounters:   06/20/18 109/72   05/21/18 110/64   02/22/18 107/70    Weight from Last 3 Encounters:   06/20/18 78.9 kg (174 lb)   05/21/18 78.5 kg (173 lb)   02/22/18 80.5 kg (177 lb 6.4 oz)                 Today's Medication Changes          These changes are accurate as of 6/20/18 12:03 PM.  If you have any questions, ask your nurse or doctor.               Stop taking these medicines if you haven't already. Please contact your care team if you have questions.     LORazepam 0.5 MG tablet   Commonly known as:  ATIVAN   Stopped by:  Rob Wiseman MD           MULTIVITAMIN GUMMIES ADULT PO   Stopped by:  Rob Wiseman MD           RESTASIS OP   Stopped by:  Rob Wiseman MD     "            Where to get your medicines      [id number] [place] [employer] [address] AT [address] , [place] [facility] [address]     Phone:  [phone]     methylPREDNISolone 32 MG tablet                Primary Care Provider Office Phone # Fax #    Mindy Vieira [phone]       [address] [place] [address]        Equal Access to Services     GREG CARR : Hadii aad ku hadasho Soomaali, waaxda luqadaha, qaybta kaalmada adeegyada, waxay idiin hayaan adeeg kharash laliana . So [facility] [phone].    ATENCIÓN: Si habla español, tiene a webb disposición servicios gratuitos de asistencia lingüística. [facility] [phone].    We comply with applicable federal civil rights laws and [place] laws. We do not discriminate on the basis of race, color, national origin, age, disability, sex, sexual orientation, or gender identity.            Thank you!     Thank you for choosing [facility]  for your care. Our goal is always to provide you with excellent care. Hearing back from our patients is one way we can continue to improve our services. Please take a few minutes to complete the written survey that you may receive in the mail after your visit with us. Thank you!             Your Updated Medication List - Protect others around you: Learn how to safely use, store and throw away your medicines at www.disposemymeds.org.          This list is accurate as of 6/20/18 12:03 PM.  Always use your most recent med list.                   Brand Name Dispense Instructions for use Diagnosis    aspirin 81 MG chewable tablet      Take 81 mg by mouth daily    Liposarcoma of retroperitoneum (H), Hypothyroidism, unspecified type, Antiphospholipid antibody syndrome (H), Gastroesophageal reflux disease, esophagitis presence not specified, History of herpes genitalis, Displacement of lumbar intervertebral disc  without myelopathy       levothyroxine 88 MCG tablet    SYNTHROID/LEVOTHROID    30 tablet    Take 1 tablet (88 mcg) by mouth daily    Hypothyroidism, unspecified type       loperamide 2 MG capsule    IMODIUM     Take 2 mg by mouth every 48 hours        meclizine 25 MG Chew      Take 25 mg by mouth every 6 hours as needed for dizziness        methylPREDNISolone 32 MG tablet    MEDROL    2 tablet    Take 1 tablet (32 mg) by mouth daily Take 1 tablet 12 hours before CT scan, take the other tablet 2 hours before CT scan    Liposarcoma of retroperitoneum (H)       omeprazole 40 MG capsule    priLOSEC    30 capsule    Take 1 capsule (40 mg) by mouth daily    Gastroesophageal reflux disease without esophagitis       prochlorperazine 10 MG tablet    COMPAZINE    20 tablet    Take 1 tablet (10 mg) by mouth every 6 hours as needed for nausea or vomiting    Vertigo       Propylene Glycol-Glycerin 1-0.3 % Soln      Apply 1 mL to eye    Liposarcoma of retroperitoneum (H), Hypothyroidism due to acquired atrophy of thyroid, Antiphospholipid antibody syndrome (H), Benign paroxysmal positional vertigo, unspecified laterality, Gastroesophageal reflux disease, esophagitis presence not specified, Displacement of lumbar intervertebral disc without myelopathy       tiZANidine 4 MG tablet    ZANAFLEX     Take 4 mg by mouth    Liposarcoma of retroperitoneum (H), Hypothyroidism due to acquired atrophy of thyroid, Antiphospholipid antibody syndrome (H), Benign paroxysmal positional vertigo, unspecified laterality, Gastroesophageal reflux disease, esophagitis presence not specified, Displacement of lumbar intervertebral disc without myelopathy       traZODone 50 MG tablet    DESYREL    30 tablet    Take 1 tablet (50 mg) by mouth nightly as needed for sleep    Insomnia, unspecified type       valACYclovir 500 MG tablet    VALTREX     Take 500 mg by mouth    Liposarcoma of retroperitoneum (H), Hypothyroidism due to acquired atrophy of thyroid,  Antiphospholipid antibody syndrome (H), Benign paroxysmal positional vertigo, unspecified laterality, Gastroesophageal reflux disease, esophagitis presence not specified, Displacement of lumbar intervertebral disc without myelopathy

## 2018-06-20 NOTE — NURSING NOTE
"Oncology Rooming Note    June 20, 2018 10:44 AM   Shiela Hewitt is a 62 year old female who presents for:    Chief Complaint   Patient presents with     Oncology Clinic Visit     Return Liposarcoma     Initial Vitals: /72  Pulse 70  Temp 97  F (36.1  C) (Oral)  Resp 16  Ht 1.74 m (5' 8.5\")  Wt 78.9 kg (174 lb)  SpO2 98%  BMI 26.07 kg/m2 Estimated body mass index is 26.07 kg/(m^2) as calculated from the following:    Height as of this encounter: 1.74 m (5' 8.5\").    Weight as of this encounter: 78.9 kg (174 lb). Body surface area is 1.95 meters squared.  No Pain (0) Comment: Data Unavailable   No LMP recorded. Patient is postmenopausal.  Allergies reviewed: Yes  Medications reviewed: Yes    Medications: MEDICATION REFILLS NEEDED TODAY. Provider was notified.  Pharmacy name entered into [employer]:    [id number] - [place] - [address] AT [address] [facility] [place] - [place], [place] - [address]  [facility] [place] [address] [facility] [address]  [facility] - [place] [id number] AT [facility] - [place], [place] - [id number]    Clinical concerns: Yes, Patient complains of vertigo at times. Dr Wiseman was notified.    10 minutes for nursing intake (face to face time)     NICK BRITT LPN            "

## 2018-07-18 NOTE — NURSING NOTE
Chief Complaint   Patient presents with     Port Flush       Vitals taken, port accessed.  Line flushed with NS and Heparin.  Port de-accessed per protocol.  Pt tolerated procedure.      Preeti Yañez RN

## 2018-08-16 DIAGNOSIS — E03.9 HYPOTHYROIDISM, UNSPECIFIED TYPE: ICD-10-CM

## 2018-08-16 NOTE — TELEPHONE ENCOUNTER
"Requested Prescriptions   Pending Prescriptions Disp Refills     levothyroxine (SYNTHROID/LEVOTHROID) 88 MCG tablet [Pharmacy Med Name: LEVOTHYROXINE 0.088MG (88MCG) TAB]  Last Written Prescription Date:  5/21/2018  Last Fill Quantity: 30 tabs,  # refills: 0   Last office visit: 5/21/2018 with prescribing provider:  Isha   Future Office Visit:     30 tablet 0     Sig: TAKE 1 TABLET(88 MCG) BY MOUTH DAILY    Thyroid Protocol Passed    8/16/2018  5:14 PM       Passed - Patient is 12 years or older       Passed - Recent (12 mo) or future (30 days) visit within the authorizing provider's specialty    Patient had office visit in the last 12 months or has a visit in the next 30 days with authorizing provider or within the authorizing provider's specialty.  See \"Patient Info\" tab in inbasket, or \"Choose Columns\" in Meds & Orders section of the refill encounter.           Passed - Normal TSH on file in past 12 months    Recent Labs   Lab Test  06/18/18   1051   TSH  0.54             Passed - No active pregnancy on record    If patient is pregnant or has had a positive pregnancy test, please check TSH.         Passed - No positive pregnancy test in past 12 months    If patient is pregnant or has had a positive pregnancy test, please check TSH.            "

## 2018-08-17 PROCEDURE — 96523 IRRIG DRUG DELIVERY DEVICE: CPT

## 2018-08-17 PROCEDURE — 25000128 H RX IP 250 OP 636: Performed by: INTERNAL MEDICINE

## 2018-08-17 RX ORDER — LEVOTHYROXINE SODIUM 88 UG/1
TABLET ORAL
Qty: 90 TABLET | Refills: 2 | Status: SHIPPED | OUTPATIENT
Start: 2018-08-17 | End: 2018-10-10

## 2018-08-17 RX ORDER — HEPARIN SODIUM (PORCINE) LOCK FLUSH IV SOLN 100 UNIT/ML 100 UNIT/ML
5 SOLUTION INTRAVENOUS ONCE
Status: COMPLETED | OUTPATIENT
Start: 2018-08-17 | End: 2018-08-17

## 2018-08-17 RX ADMIN — SODIUM CHLORIDE, PRESERVATIVE FREE 5 ML: 5 INJECTION INTRAVENOUS at 10:22

## 2018-08-17 NOTE — NURSING NOTE
Chief Complaint   Patient presents with     Port Flush     port flushed by RN     There were no vitals taken for this visit.    Port accessed by RN. Line flushed with NS & Heparin. Pt tolerated well.     Jaymie Forman

## 2018-08-29 ENCOUNTER — TELEPHONE (OUTPATIENT)
Dept: FAMILY MEDICINE | Facility: CLINIC | Age: 63
End: 2018-08-29

## 2018-10-10 DIAGNOSIS — E03.9 HYPOTHYROIDISM, UNSPECIFIED TYPE: ICD-10-CM

## 2018-10-10 NOTE — TELEPHONE ENCOUNTER
Requested Prescriptions   Pending Prescriptions Disp Refills     levothyroxine (SYNTHROID/LEVOTHROID) 88 MCG tablet  Last Written Prescription Date:  8/17/18  Last Fill Quantity: 90,  # refills: 2   Last office visit: 5/21/2018 with prescribing provider:  Isha   Future Office Visit:     90 tablet 2    There is no refill protocol information for this order

## 2018-10-11 RX ORDER — LEVOTHYROXINE SODIUM 88 UG/1
TABLET ORAL
Qty: 90 TABLET | Refills: 2 | Status: SHIPPED | OUTPATIENT
Start: 2018-10-11 | End: 2019-11-26

## 2018-10-11 NOTE — TELEPHONE ENCOUNTER
Prescription approved per [facility] Refill Protocol.  Jessiac Carmona,Clinic Rn  Sasser Rowland

## 2018-11-19 ENCOUNTER — RADIANT APPOINTMENT (OUTPATIENT)
Dept: MAMMOGRAPHY | Facility: CLINIC | Age: 63
End: 2018-11-19
Payer: COMMERCIAL

## 2018-11-19 DIAGNOSIS — Z12.31 VISIT FOR SCREENING MAMMOGRAM: ICD-10-CM

## 2018-11-19 PROCEDURE — 77067 SCR MAMMO BI INCL CAD: CPT | Mod: TC

## 2018-11-19 PROCEDURE — 77063 BREAST TOMOSYNTHESIS BI: CPT | Mod: TC

## 2018-11-27 ENCOUNTER — RADIANT APPOINTMENT (OUTPATIENT)
Dept: CT IMAGING | Facility: CLINIC | Age: 63
End: 2018-11-27
Attending: INTERNAL MEDICINE
Payer: COMMERCIAL

## 2018-11-27 DIAGNOSIS — E03.4 HYPOTHYROIDISM DUE TO ACQUIRED ATROPHY OF THYROID: ICD-10-CM

## 2018-11-27 DIAGNOSIS — C48.0 LIPOSARCOMA OF RETROPERITONEUM (H): ICD-10-CM

## 2018-11-27 DIAGNOSIS — D68.61 ANTIPHOSPHOLIPID ANTIBODY SYNDROME (H): ICD-10-CM

## 2018-11-27 DIAGNOSIS — H81.10 BENIGN PAROXYSMAL POSITIONAL VERTIGO, UNSPECIFIED LATERALITY: ICD-10-CM

## 2018-11-27 DIAGNOSIS — K21.9 GASTROESOPHAGEAL REFLUX DISEASE, ESOPHAGITIS PRESENCE NOT SPECIFIED: ICD-10-CM

## 2018-11-27 DIAGNOSIS — M51.26 DISPLACEMENT OF LUMBAR INTERVERTEBRAL DISC WITHOUT MYELOPATHY: ICD-10-CM

## 2018-11-27 DIAGNOSIS — C48.0 LIPOSARCOMA OF RETROPERITONEUM (H): Primary | ICD-10-CM

## 2018-11-27 LAB
ALBUMIN SERPL-MCNC: 3.2 G/DL (ref 3.4–5)
ALP SERPL-CCNC: 70 U/L (ref 40–150)
ALT SERPL W P-5'-P-CCNC: 19 U/L (ref 0–50)
ANION GAP SERPL CALCULATED.3IONS-SCNC: 10 MMOL/L (ref 3–14)
AST SERPL W P-5'-P-CCNC: 16 U/L (ref 0–45)
BASOPHILS # BLD AUTO: 0 10E9/L (ref 0–0.2)
BASOPHILS NFR BLD AUTO: 0 %
BILIRUB SERPL-MCNC: 0.5 MG/DL (ref 0.2–1.3)
BUN SERPL-MCNC: 23 MG/DL (ref 7–30)
CALCIUM SERPL-MCNC: 8.3 MG/DL (ref 8.5–10.1)
CHLORIDE SERPL-SCNC: 106 MMOL/L (ref 94–109)
CHOLEST SERPL-MCNC: 113 MG/DL
CO2 SERPL-SCNC: 21 MMOL/L (ref 20–32)
CREAT SERPL-MCNC: 0.89 MG/DL (ref 0.52–1.04)
DIFFERENTIAL METHOD BLD: ABNORMAL
EOSINOPHIL # BLD AUTO: 0 10E9/L (ref 0–0.7)
EOSINOPHIL NFR BLD AUTO: 0.1 %
ERYTHROCYTE [DISTWIDTH] IN BLOOD BY AUTOMATED COUNT: 12.7 % (ref 10–15)
GFR SERPL CREATININE-BSD FRML MDRD: 64 ML/MIN/1.7M2
GLUCOSE SERPL-MCNC: 113 MG/DL (ref 70–99)
HCT VFR BLD AUTO: 33.2 % (ref 35–47)
HDLC SERPL-MCNC: 65 MG/DL
HGB BLD-MCNC: 11.6 G/DL (ref 11.7–15.7)
IMM GRANULOCYTES # BLD: 0 10E9/L (ref 0–0.4)
IMM GRANULOCYTES NFR BLD: 0.4 %
LDLC SERPL CALC-MCNC: 41 MG/DL
LYMPHOCYTES # BLD AUTO: 0.6 10E9/L (ref 0.8–5.3)
LYMPHOCYTES NFR BLD AUTO: 8.7 %
MCH RBC QN AUTO: 34.4 PG (ref 26.5–33)
MCHC RBC AUTO-ENTMCNC: 34.9 G/DL (ref 31.5–36.5)
MCV RBC AUTO: 99 FL (ref 78–100)
MONOCYTES # BLD AUTO: 0.1 10E9/L (ref 0–1.3)
MONOCYTES NFR BLD AUTO: 1.9 %
NEUTROPHILS # BLD AUTO: 6 10E9/L (ref 1.6–8.3)
NEUTROPHILS NFR BLD AUTO: 88.9 %
NONHDLC SERPL-MCNC: 48 MG/DL
NRBC # BLD AUTO: 0 10*3/UL
NRBC BLD AUTO-RTO: 0 /100
PLATELET # BLD AUTO: 177 10E9/L (ref 150–450)
POTASSIUM SERPL-SCNC: 3.4 MMOL/L (ref 3.4–5.3)
PROT SERPL-MCNC: 6.2 G/DL (ref 6.8–8.8)
RBC # BLD AUTO: 3.37 10E12/L (ref 3.8–5.2)
SODIUM SERPL-SCNC: 137 MMOL/L (ref 133–144)
TRIGL SERPL-MCNC: 39 MG/DL
TSH SERPL DL<=0.005 MIU/L-ACNC: 0.75 MU/L (ref 0.4–4)
WBC # BLD AUTO: 6.8 10E9/L (ref 4–11)

## 2018-11-27 RX ORDER — IOPAMIDOL 755 MG/ML
107 INJECTION, SOLUTION INTRAVASCULAR ONCE
Status: COMPLETED | OUTPATIENT
Start: 2018-11-27 | End: 2018-11-27

## 2018-11-27 RX ORDER — HEPARIN SODIUM (PORCINE) LOCK FLUSH IV SOLN 100 UNIT/ML 100 UNIT/ML
5 SOLUTION INTRAVENOUS ONCE
Status: COMPLETED | OUTPATIENT
Start: 2018-11-27 | End: 2018-11-27

## 2018-11-27 RX ADMIN — IOPAMIDOL 107 ML: 755 INJECTION, SOLUTION INTRAVASCULAR at 09:51

## 2018-11-27 RX ADMIN — HEPARIN SODIUM (PORCINE) LOCK FLUSH IV SOLN 100 UNIT/ML 5 ML: 100 SOLUTION at 10:06

## 2018-11-27 NOTE — DISCHARGE INSTRUCTIONS

## 2018-11-29 ENCOUNTER — ONCOLOGY VISIT (OUTPATIENT)
Dept: ONCOLOGY | Facility: CLINIC | Age: 63
End: 2018-11-29
Attending: INTERNAL MEDICINE
Payer: COMMERCIAL

## 2018-11-29 VITALS
OXYGEN SATURATION: 98 % | DIASTOLIC BLOOD PRESSURE: 66 MMHG | TEMPERATURE: 97.1 F | HEART RATE: 69 BPM | WEIGHT: 177.91 LBS | RESPIRATION RATE: 16 BRPM | BODY MASS INDEX: 26.65 KG/M2 | SYSTOLIC BLOOD PRESSURE: 105 MMHG

## 2018-11-29 DIAGNOSIS — K21.9 GASTROESOPHAGEAL REFLUX DISEASE WITHOUT ESOPHAGITIS: ICD-10-CM

## 2018-11-29 DIAGNOSIS — E03.4 HYPOTHYROIDISM DUE TO ACQUIRED ATROPHY OF THYROID: ICD-10-CM

## 2018-11-29 DIAGNOSIS — Z86.19 HISTORY OF HERPES GENITALIS: ICD-10-CM

## 2018-11-29 DIAGNOSIS — H81.11 BENIGN PAROXYSMAL POSITIONAL VERTIGO OF RIGHT EAR: ICD-10-CM

## 2018-11-29 DIAGNOSIS — C48.0 LIPOSARCOMA OF RETROPERITONEUM (H): ICD-10-CM

## 2018-11-29 DIAGNOSIS — D68.61 ANTIPHOSPHOLIPID ANTIBODY SYNDROME (H): ICD-10-CM

## 2018-11-29 PROCEDURE — 99214 OFFICE O/P EST MOD 30 MIN: CPT | Mod: ZP | Performed by: INTERNAL MEDICINE

## 2018-11-29 ASSESSMENT — PAIN SCALES - GENERAL: PAINLEVEL: NO PAIN (0)

## 2018-11-29 NOTE — PROGRESS NOTES
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   -  Background  In brief, she noted an abdominal mass at the end of 2013, which was quite large and was resected on 01/28/2014. This tumor was about 26 cm in greatest diameter, high-grade, with necrosis present. In retrospect, she had had a CT scan done about 2 years before that, which was felt to be negative. In 08/2014, repeat imaging revealed a 3.5 cm mass near the psoas, and she received preoperative radiation therapy for this. 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 A new margin was obtained, but this was still positive for liposarcoma. All visible tumor was removed.      A recurrence was noted in the abd and she began lipodox/ifos about 3-19-15 and had 6 cycles; the last in August 2015.  -  She was admitted for partial SBO earlier in 2018.     Interval history     Sandro is doing pretty very well now.    In June she had several episodes of vertigo and developed noise in her R ear requiring hospitalization.  She is not having vertigo now but has R hearing loss.    She is now off imodium. She has some loose stool and she takes Imodium for it sometimes. Typically she has about 6 loose stools/d.         She has occ GERD.  She is off omeprazole.    She usually stays in [place] from January through May. She plans to drive back to [place] early Jan.      Right thigh is also numb since the second surgery.    She has some weakness in the right leg but is getting better and if that does not affect her life.  She can walk a mile at least with no problem but with the ear she sometimes has some balance problems.    She has a longstanding history of hypothyroidism on replacement T4, and some seasonal allergies. She was also noted to have an antiphospholipid antibody. This was checked because her sister presented with a mesenteric artery clot.       Her  10-point ROS is otherwise unremarkable      -  Background PMH, FH, SH  PAST MEDICAL HISTORY: She did have a tonsillectomy at age 15 and some sinus surgery in about 1994 along with a tubal pregnancy and a tubal ligation.   ALLERGIES: She does describe 1 hive after IV contrast material about 20 years ago, so she has been taking prednisone and Benadryl for contrast scans. She does have some itching with Dilaudid, but still uses it.   SOCIAL HISTORY: She is a never smoker and does not abuse alcohol or other drugs. 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  -          On exam she appeared comfortable with normal affect.   /66  Pulse 69  Temp 97.1  F (36.2  C) (Oral)  Resp 16  Wt 80.7 kg (177 lb 14.6 oz)  SpO2 98%  BMI 26.65 kg/m2  HEENT No icterus.   NECK No thyromegaly or neck mass.  CHEST clear chest.   LYMPH No lymphadenopathy.  CV RRR w no sig murmur.  ABD No HSMT.  NEURO AAO X3, Romberg slightly shaky but OK.  EXT No edema.    PSYCH Mood good.    -  CBC, LFT, GNE OK though Hb 11.6.      -   It was felt that on her outside imaging pre chemo that there seemed to be a recurrence in the abd; this was a complicated image. This region on the psoas seemed a bit more prominent on 3-14 c/w Feb, and on the 3-31 is less prominent. We dont have a biopsy of this but the series was c/w tumor response. The CT of ay 2015 showed some colitis.      I reviewed her new CT images today and report and there is no clear PD and that is the formal read.      -  A/P   We discussed a number of issues. She had facial redness after the scan (typically the next day) but it did not bother her much so we will not change anything in that regard next time. She has steroid for the next scan.     She is > 3 years post Chemotherapy and is doing well.  We will plan to restage about 5-24 after she comes back from [place].       All questions were addressed and she will call if other questions  arise.      Rob Wiseman M.D.  Professor  Hematology, Oncology and Transplantation

## 2018-11-29 NOTE — LETTER
11/29/2018       RE: Shiela Hewitt  [address] [facility] [address]     Dear Colleague,    Thank you for referring your patient, Shiela Hewitt, to the [facility]. Please see a copy of my visit note below.    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   -  Background  In brief, she noted an abdominal mass at the end of 2013, which was quite large and was resected on 01/28/2014. This tumor was about 26 cm in greatest diameter, high-grade, with necrosis present. In retrospect, she had had a CT scan done about 2 years before that, which was felt to be negative. In 08/2014, repeat imaging revealed a 3.5 cm mass near the psoas, and she received preoperative radiation therapy for this. 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 A new margin was obtained, but this was still positive for liposarcoma. All visible tumor was removed.      A recurrence was noted in the abd and she began lipodox/ifos about 3-19-15 and had 6 cycles; the last in August 2015.  -  She was admitted for partial SBO earlier in 2018.     Interval history     Sandro is doing pretty very well now.    In June she had several episodes of vertigo and developed noise in her R ear requiring hospitalization.  She  is not having vertigo now but has R hearing loss.    She is now off imodium. She has some loose stool and she takes Imodium for it sometimes. Typically she has about 6 loose stools/d.         She has occ GERD.  She is off omeprazole.    She usually stays in [place] from January through May. She plans to drive back to [place] early Jan.      Right thigh is also numb since the second surgery.    She has some weakness in the right leg but is getting better and if that does not affect her life.  She can walk a mile at least with no problem but with the ear she sometimes has some balance problems.    She  has a longstanding history of hypothyroidism on replacement T4, and some seasonal allergies. She was also noted to have an antiphospholipid antibody. This was checked because her sister presented with a mesenteric artery clot.       Her 10-point ROS is otherwise unremarkable      -  Background PMH, FH, SH  PAST MEDICAL HISTORY: She did have a tonsillectomy at age 15 and some sinus surgery in about 1994 along with a tubal pregnancy and a tubal ligation.   ALLERGIES: She does describe 1 hive after IV contrast material about 20 years ago, so she has been taking prednisone and Benadryl for contrast scans. She does have some itching with Dilaudid, but still uses it.   SOCIAL HISTORY: She is a never smoker and does not abuse alcohol or other drugs. 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  -          On exam she appeared comfortable with normal affect.   /66  Pulse 69  Temp 97.1  F (36.2  C) (Oral)  Resp 16  Wt 80.7 kg (177 lb 14.6 oz)  SpO2 98%  BMI 26.65 kg/m2  HEENT No icterus.   NECK No thyromegaly or neck mass.  CHEST clear chest.   LYMPH No lymphadenopathy.  CV RRR w no sig murmur.  ABD No HSMT.  NEURO AAO X3, Romberg slightly shaky but OK.  EXT No edema.    PSYCH Mood good.    -  CBC, LFT, GNE OK though Hb 11.6.      -   It was felt that on her outside imaging pre chemo that there seemed to be a recurrence in the abd; this was a complicated image. This region on the psoas seemed a bit more prominent on 3-14 c/w Feb, and on the 3-31 is less prominent. We dont have a biopsy of this but the series was c/w tumor response. The CT of ay 2015 showed some colitis.      I reviewed her new CT images today and report and there is no clear PD and that is the formal read.      -  A/P   We discussed a number of issues. She had facial redness after the scan (typically the next day) but it did not bother her much so we will not change anything in that regard next time.  She has steroid for the next scan.     She is > 3 years post Chemotherapy and is doing well.  We will plan to restage about 5-24 after she comes back from [place].       All questions were addressed and she will call if other questions arise.      Rob Wiseman M.D.  Professor  Hematology, Oncology and Transplantation

## 2018-11-29 NOTE — MR AVS SNAPSHOT
After Visit Summary   11/29/2018    Shiela Hewitt    MRN: [id number]           Patient Information     Date Of Birth          1955        Visit Information        Provider Department      11/29/2018 10:00 AM Rob Wiseman MD [facility]        Today's Diagnoses     Liposarcoma of retroperitoneum (H)        Hypothyroidism due to acquired atrophy of thyroid        Antiphospholipid antibody syndrome (H)        Benign paroxysmal positional vertigo of right ear        Gastroesophageal reflux disease without esophagitis        History of herpes genitalis           Follow-ups after your visit        Your next 10 appointments already scheduled     May 28, 2019 10:00 AM CDT   (Arrive by 9:45 AM)   Return Visit with Rob Wiseman MD   [facility] ([facility])    [address] [place] [address]  [address] [place] [id number] [phone]              Future tests that were ordered for you today     Open Future Orders        Priority Expected Expires Ordered    CT Chest Abdomen Pelvis w/o & w Contrast Routine 5/24/2019 11/29/2019 11/29/2018    CBC with platelets differential Routine 5/24/2019 11/29/2019 11/29/2018    Comprehensive metabolic panel Routine 5/24/2019 11/29/2019 11/29/2018            Who to contact     If you have questions or need follow up information about today's clinic visit or your schedule please contact [facility] directly at [phone].  Normal or non-critical lab and imaging results will be communicated to you by MyChart, letter or phone within 4 business days after the clinic has received the results. If you do not hear from us within 7 days, please contact the clinic through MyChart or phone. If you have a critical or abnormal lab result, we will notify you by phone as soon as possible.  Submit refill requests through [employer] or call your pharmacy and they will forward the refill  request to us. Please allow 3 business days for your refill to be completed.          Additional Information About Your Visit        [employer] Information     [employer] gives you secure access to your electronic health record. If you see a primary care provider, you can also send messages to your care team and make appointments. If you have questions, please call your primary care clinic.  If you do not have a primary care provider, please call [phone] and they will assist you.        Care EveryWhere ID     This is your Care EveryWhere ID. This could be used by other organizations to access your [facility] medical records  [id number]        Your Vitals Were     Pulse Temperature Respirations Pulse Oximetry BMI (Body Mass Index)       69 97.1  F (36.2  C) (Oral) 16 98% 26.65 kg/m2        Blood Pressure from Last 3 Encounters:   11/29/18 105/66   06/20/18 109/72   05/21/18 110/64    Weight from Last 3 Encounters:   11/29/18 80.7 kg (177 lb 14.6 oz)   06/20/18 78.9 kg (174 lb)   05/21/18 78.5 kg (173 lb)                 Today's Medication Changes          These changes are accurate as of 11/29/18 10:15 AM.  If you have any questions, ask your nurse or doctor.               Stop taking these medicines if you haven't already. Please contact your care team if you have questions.     aspirin 81 MG chewable tablet   Commonly known as:  ASA   Stopped by:  Rob Wiseman MD           loperamide 2 MG capsule   Commonly known as:  IMODIUM   Stopped by:  Rob Wiseman MD           omeprazole 40 MG DR capsule   Commonly known as:  priLOSEC   Stopped by:  Rob Wiseman MD           prochlorperazine 10 MG tablet   Commonly known as:  COMPAZINE   Stopped by:  Rob Wiseman MD           traZODone 50 MG tablet   Commonly known as:  DESYREL   Stopped by:  Rob Wiseman MD                    Primary Care Provider Office Phone # Fax #    Mindy Vieira [phone]        [address] [facility] [address]        Equal Access to Services     GREG CARR : Hadii aad ku hadsaraghazal Rea, waosvaldowalter felix, qaasuncionsadia bucioshawnwalter beckwith, seven maxwellanithajanice liriano. So [facility] [phone].    ATENCIÓN: Si habla español, tiene a webb disposición servicios gratuitos de asistencia lingüística. LlThe [facility] [phone].    We comply with applicable federal civil rights laws and [place] laws. We do not discriminate on the basis of race, color, national origin, age, disability, sex, sexual orientation, or gender identity.            Thank you!     Thank you for choosing [facility]  for your care. Our goal is always to provide you with excellent care. Hearing back from our patients is one way we can continue to improve our services. Please take a few minutes to complete the written survey that you may receive in the mail after your visit with us. Thank you!             Your Updated Medication List - Protect others around you: Learn how to safely use, store and throw away your medicines at www.disposemymeds.org.          This list is accurate as of 11/29/18 10:15 AM.  Always use your most recent med list.                   Brand Name Dispense Instructions for use Diagnosis    levothyroxine 88 MCG tablet    SYNTHROID/LEVOTHROID    90 tablet    TAKE 1 TABLET(88 MCG) BY MOUTH DAILY    Hypothyroidism, unspecified type       meclizine 25 MG Chew      Take 25 mg by mouth every 6 hours as needed for dizziness        methylPREDNISolone 32 MG tablet    MEDROL    2 tablet    Take 1 tablet (32 mg) by mouth daily Take 1 tablet 12 hours before CT scan, take the other tablet 2 hours before CT scan    Liposarcoma of retroperitoneum (H)       Propylene Glycol-Glycerin 1-0.3 % Soln      Apply 1 mL to eye    Liposarcoma of retroperitoneum (H), Hypothyroidism due to acquired atrophy of thyroid, Antiphospholipid antibody syndrome (H), Benign paroxysmal positional vertigo,  unspecified laterality, Gastroesophageal reflux disease, esophagitis presence not specified, Displacement of lumbar intervertebral disc without myelopathy       tiZANidine 4 MG tablet    ZANAFLEX     Take 4 mg by mouth    Liposarcoma of retroperitoneum (H), Hypothyroidism due to acquired atrophy of thyroid, Antiphospholipid antibody syndrome (H), Benign paroxysmal positional vertigo, unspecified laterality, Gastroesophageal reflux disease, esophagitis presence not specified, Displacement of lumbar intervertebral disc without myelopathy       valACYclovir 500 MG tablet    VALTREX     Take 500 mg by mouth    Liposarcoma of retroperitoneum (H), Hypothyroidism due to acquired atrophy of thyroid, Antiphospholipid antibody syndrome (H), Benign paroxysmal positional vertigo, unspecified laterality, Gastroesophageal reflux disease, esophagitis presence not specified, Displacement of lumbar intervertebral disc without myelopathy

## 2018-11-29 NOTE — NURSING NOTE
"Oncology Rooming Note    November 29, 2018 9:52 AM   Shiela Hewitt is a 63 year old female who presents for:    Chief Complaint   Patient presents with     Oncology Clinic Visit     Liposarcoma, Lab, CT results      Initial Vitals: /66  Pulse 69  Temp 97.1  F (36.2  C) (Oral)  Resp 16  Wt 80.7 kg (177 lb 14.6 oz)  SpO2 98%  BMI 26.65 kg/m2 Estimated body mass index is 26.65 kg/(m^2) as calculated from the following:    Height as of 6/20/18: 1.74 m (5' 8.5\").    Weight as of this encounter: 80.7 kg (177 lb 14.6 oz). Body surface area is 1.97 meters squared.  No Pain (0) Comment: Data Unavailable   No LMP recorded. Patient is postmenopausal.  Allergies reviewed: Yes  Medications reviewed: Yes    Medications: Medication refills not needed today.  Pharmacy name entered into [employer]:    [id number] [place] - [address] [facility] [address] & [facility] [place] [address]  [facility] [place] [phone]  [facility] - [place] [address] [facility] [address] - [facility] [address] OF  [address]  [facility] - [facility] [address]    Clinical concerns: Results  Bowen was notified.    8  minutes for nursing intake (face to face time)     Kari Ortega MA              "

## 2018-12-28 PROCEDURE — 25000128 H RX IP 250 OP 636: Performed by: INTERNAL MEDICINE

## 2018-12-28 RX ORDER — HEPARIN SODIUM (PORCINE) LOCK FLUSH IV SOLN 100 UNIT/ML 100 UNIT/ML
5 SOLUTION INTRAVENOUS EVERY 8 HOURS
Status: DISCONTINUED | OUTPATIENT
Start: 2018-12-28 | End: 2019-01-05 | Stop reason: HOSPADM

## 2018-12-28 RX ADMIN — SODIUM CHLORIDE, PRESERVATIVE FREE 5 ML: 5 INJECTION INTRAVENOUS at 10:34

## 2018-12-28 NOTE — NURSING NOTE
Chief Complaint   Patient presents with     Port Flush     Port flushed with saline and heparin

## 2019-05-24 ENCOUNTER — ANCILLARY PROCEDURE (OUTPATIENT)
Dept: CT IMAGING | Facility: CLINIC | Age: 64
End: 2019-05-24
Attending: INTERNAL MEDICINE
Payer: COMMERCIAL

## 2019-05-24 DIAGNOSIS — D68.61 ANTIPHOSPHOLIPID ANTIBODY SYNDROME (H): ICD-10-CM

## 2019-05-24 DIAGNOSIS — C48.0 LIPOSARCOMA OF RETROPERITONEUM (H): ICD-10-CM

## 2019-05-24 DIAGNOSIS — H81.11 BENIGN PAROXYSMAL POSITIONAL VERTIGO OF RIGHT EAR: ICD-10-CM

## 2019-05-24 DIAGNOSIS — E03.4 HYPOTHYROIDISM DUE TO ACQUIRED ATROPHY OF THYROID: ICD-10-CM

## 2019-05-24 DIAGNOSIS — Z86.19 HISTORY OF HERPES GENITALIS: ICD-10-CM

## 2019-05-24 DIAGNOSIS — K21.9 GASTROESOPHAGEAL REFLUX DISEASE WITHOUT ESOPHAGITIS: ICD-10-CM

## 2019-05-24 LAB
ALBUMIN SERPL-MCNC: 4 G/DL (ref 3.4–5)
ALP SERPL-CCNC: 88 U/L (ref 40–150)
ALT SERPL W P-5'-P-CCNC: 24 U/L (ref 0–50)
ANION GAP SERPL CALCULATED.3IONS-SCNC: 8 MMOL/L (ref 3–14)
AST SERPL W P-5'-P-CCNC: 18 U/L (ref 0–45)
BASOPHILS # BLD AUTO: 0 10E9/L (ref 0–0.2)
BASOPHILS NFR BLD AUTO: 0.1 %
BILIRUB SERPL-MCNC: 0.6 MG/DL (ref 0.2–1.3)
BUN SERPL-MCNC: 22 MG/DL (ref 7–30)
CALCIUM SERPL-MCNC: 9.5 MG/DL (ref 8.5–10.1)
CHLORIDE SERPL-SCNC: 107 MMOL/L (ref 94–109)
CO2 SERPL-SCNC: 24 MMOL/L (ref 20–32)
CREAT SERPL-MCNC: 0.87 MG/DL (ref 0.52–1.04)
DIFFERENTIAL METHOD BLD: ABNORMAL
EOSINOPHIL # BLD AUTO: 0 10E9/L (ref 0–0.7)
EOSINOPHIL NFR BLD AUTO: 0 %
ERYTHROCYTE [DISTWIDTH] IN BLOOD BY AUTOMATED COUNT: 12.8 % (ref 10–15)
GFR SERPL CREATININE-BSD FRML MDRD: 71 ML/MIN/{1.73_M2}
GLUCOSE SERPL-MCNC: 127 MG/DL (ref 70–99)
HCT VFR BLD AUTO: 38.7 % (ref 35–47)
HGB BLD-MCNC: 13.1 G/DL (ref 11.7–15.7)
IMM GRANULOCYTES # BLD: 0 10E9/L (ref 0–0.4)
IMM GRANULOCYTES NFR BLD: 0.4 %
LYMPHOCYTES # BLD AUTO: 0.7 10E9/L (ref 0.8–5.3)
LYMPHOCYTES NFR BLD AUTO: 9.5 %
MCH RBC QN AUTO: 34.1 PG (ref 26.5–33)
MCHC RBC AUTO-ENTMCNC: 33.9 G/DL (ref 31.5–36.5)
MCV RBC AUTO: 101 FL (ref 78–100)
MONOCYTES # BLD AUTO: 0.1 10E9/L (ref 0–1.3)
MONOCYTES NFR BLD AUTO: 1.2 %
NEUTROPHILS # BLD AUTO: 6.1 10E9/L (ref 1.6–8.3)
NEUTROPHILS NFR BLD AUTO: 88.8 %
NRBC # BLD AUTO: 0 10*3/UL
NRBC BLD AUTO-RTO: 0 /100
PLATELET # BLD AUTO: 190 10E9/L (ref 150–450)
POTASSIUM SERPL-SCNC: 4 MMOL/L (ref 3.4–5.3)
PROT SERPL-MCNC: 7.3 G/DL (ref 6.8–8.8)
RBC # BLD AUTO: 3.84 10E12/L (ref 3.8–5.2)
SODIUM SERPL-SCNC: 139 MMOL/L (ref 133–144)
WBC # BLD AUTO: 6.8 10E9/L (ref 4–11)

## 2019-05-24 PROCEDURE — 25000128 H RX IP 250 OP 636: Performed by: INTERNAL MEDICINE

## 2019-05-24 PROCEDURE — 85025 COMPLETE CBC W/AUTO DIFF WBC: CPT | Performed by: INTERNAL MEDICINE

## 2019-05-24 PROCEDURE — 80053 COMPREHEN METABOLIC PANEL: CPT | Performed by: INTERNAL MEDICINE

## 2019-05-24 RX ORDER — HEPARIN SODIUM (PORCINE) LOCK FLUSH IV SOLN 100 UNIT/ML 100 UNIT/ML
5 SOLUTION INTRAVENOUS ONCE
Status: COMPLETED | OUTPATIENT
Start: 2019-05-24 | End: 2019-05-24

## 2019-05-24 RX ORDER — HEPARIN SODIUM (PORCINE) LOCK FLUSH IV SOLN 100 UNIT/ML 100 UNIT/ML
5 SOLUTION INTRAVENOUS EVERY 8 HOURS PRN
Status: DISCONTINUED | OUTPATIENT
Start: 2019-05-24 | End: 2019-06-01 | Stop reason: HOSPADM

## 2019-05-24 RX ORDER — IOPAMIDOL 755 MG/ML
108 INJECTION, SOLUTION INTRAVASCULAR ONCE
Status: COMPLETED | OUTPATIENT
Start: 2019-05-24 | End: 2019-05-24

## 2019-05-24 RX ADMIN — HEPARIN 5 ML: 100 SYRINGE at 09:04

## 2019-05-24 RX ADMIN — IOPAMIDOL 108 ML: 755 INJECTION, SOLUTION INTRAVASCULAR at 10:05

## 2019-05-24 RX ADMIN — HEPARIN SODIUM (PORCINE) LOCK FLUSH IV SOLN 100 UNIT/ML 5 ML: 100 SOLUTION at 10:51

## 2019-05-24 NOTE — NURSING NOTE
Chief Complaint   Patient presents with     Port Draw     Labs drawn via port by RN in lab. Line flushed and hep locked. Ready for CT. Lab only appt.     Dorene Medina RN

## 2019-05-24 NOTE — DISCHARGE INSTRUCTIONS

## 2019-05-28 ENCOUNTER — ONCOLOGY VISIT (OUTPATIENT)
Dept: ONCOLOGY | Facility: CLINIC | Age: 64
End: 2019-05-28
Attending: INTERNAL MEDICINE
Payer: COMMERCIAL

## 2019-05-28 VITALS
BODY MASS INDEX: 25.53 KG/M2 | HEIGHT: 69 IN | WEIGHT: 172.4 LBS | SYSTOLIC BLOOD PRESSURE: 104 MMHG | TEMPERATURE: 99.2 F | OXYGEN SATURATION: 98 % | RESPIRATION RATE: 18 BRPM | DIASTOLIC BLOOD PRESSURE: 64 MMHG | HEART RATE: 77 BPM

## 2019-05-28 DIAGNOSIS — M25.541 ARTHRALGIA OF RIGHT HAND: ICD-10-CM

## 2019-05-28 DIAGNOSIS — K21.9 GASTROESOPHAGEAL REFLUX DISEASE WITHOUT ESOPHAGITIS: ICD-10-CM

## 2019-05-28 DIAGNOSIS — D68.61 ANTIPHOSPHOLIPID ANTIBODY SYNDROME (H): ICD-10-CM

## 2019-05-28 DIAGNOSIS — E03.4 HYPOTHYROIDISM DUE TO ACQUIRED ATROPHY OF THYROID: ICD-10-CM

## 2019-05-28 DIAGNOSIS — C48.0 LIPOSARCOMA OF RETROPERITONEUM (H): Primary | ICD-10-CM

## 2019-05-28 DIAGNOSIS — H81.11 BENIGN PAROXYSMAL POSITIONAL VERTIGO OF RIGHT EAR: ICD-10-CM

## 2019-05-28 PROCEDURE — 99214 OFFICE O/P EST MOD 30 MIN: CPT | Mod: ZP | Performed by: INTERNAL MEDICINE

## 2019-05-28 PROCEDURE — G0463 HOSPITAL OUTPT CLINIC VISIT: HCPCS | Mod: ZF

## 2019-05-28 RX ORDER — PREDNISONE 10 MG/1
TABLET ORAL
COMMUNITY
End: 2019-06-03

## 2019-05-28 RX ORDER — TRAZODONE HYDROCHLORIDE 50 MG/1
50 TABLET, FILM COATED ORAL
COMMUNITY
Start: 2017-10-16 | End: 2019-06-03

## 2019-05-28 RX ORDER — OMEPRAZOLE 40 MG/1
40 CAPSULE, DELAYED RELEASE ORAL
COMMUNITY
End: 2019-06-03

## 2019-05-28 ASSESSMENT — PAIN SCALES - GENERAL: PAINLEVEL: NO PAIN (0)

## 2019-05-28 ASSESSMENT — MIFFLIN-ST. JEOR: SCORE: 1398.5

## 2019-05-28 NOTE — PROGRESS NOTES
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   -  Background  In brief, she noted an abdominal mass at the end of 2013, which was quite large and was resected on 01/28/2014. This tumor was about 26 cm in greatest diameter, high-grade, with necrosis present. In retrospect, she had had a CT scan done about 2 years before that, which was felt to be negative. In 08/2014, repeat imaging revealed a 3.5 cm mass near the psoas, and she received preoperative radiation therapy for this. 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 A new margin was obtained, but this was still positive for liposarcoma. All visible tumor was removed.      A recurrence was noted in the abd and she began lipodox/ifos about 3-19-15 and had 6 cycles; the last in August 2015.  -  She was admitted for partial SBO earlier in 2018.  -      Interval history     Sandro is doing pretty very well now.     Vertigo is OK now.  In June 2018 she had several episodes of vertigo and developed noise in her R ear requiring hospitalization.  She is not having vertigo now but has R hearing loss.     She is now off imodium.  Typically she has about 6 (sometimes 12) loose stools/d.         She has occ GERD.  She is off omeprazole.     She usually stays in [place] from January through May. She plans to drive back to [place] Sept     R MCP 2 developed pain acutely one morning in [place].  She does play golf and holding the club can make it worse.    Right thigh is also numb since the second surgery.    She has some weakness in the right leg , stable, and if that does not affect her life.  She can walk a mile at least with no problem but with the ear she sometimes has some balance problems.     She has a longstanding history of hypothyroidism on replacement T4, and some seasonal allergies. She was also noted to have an antiphospholipid antibody. This was checked  "because her sister presented with a mesenteric artery clot.    No longer on PM trazadone and sleep not perfect w hot flashes and some feet sensations.     Her 10-point ROS is otherwise unremarkable      -  Background PMH, FH, SH  PAST MEDICAL HISTORY: She did have a tonsillectomy at age 15 and some sinus surgery in about 1994 along with a tubal pregnancy and a tubal ligation.   ALLERGIES: She does describe 1 hive after IV contrast material about 20 years ago, so she has been taking prednisone and Benadryl for contrast scans. She does have some itching with Dilaudid, but still uses it.   SOCIAL HISTORY: She is a never smoker and does not abuse alcohol or other drugs. 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  -          On exam she appeared comfortable with normal affect.   /64 (BP Location: Left arm, Patient Position: Sitting, Cuff Size: Adult Large)   Pulse 77   Temp 99.2  F (37.3  C) (Oral)   Resp 18   Ht 1.74 m (5' 8.5\")   Wt 78.2 kg (172 lb 6.4 oz)   SpO2 98%   BMI 25.83 kg/m    HEENT No icterus.   NECK No thyromegaly or neck mass.  CHEST clear chest.   CV RRR w no sig murmur.  ABD No HSMT.  LYMPH No lymphadenopathy.  NEURO  Romberg slightly shaky but OK, heel/toe walk OK.  EXT No edema.    PSYCH Mood good.  FOCUSED EXAM No synovitis evident in R hand, some pain around MCP 2 that is augmented by making a fist; burning sensation.    -  CBC, LFT, GNE OK though .    -   It was felt that on her outside imaging pre chemo that there seemed to be a recurrence in the abd; this was a complicated image. This region on the psoas seemed a bit more prominent on 3-14 c/w Feb, and on the 3-31 is less prominent. We dont have a biopsy of this but the series was c/w tumor response. The CT of 2015 showed some colitis.      I reviewed her new CT images today and report and there is no clear PD and that is the formal read; she does have some small lung nodules " stable since June 2018.      -  A/P   We discussed a number of issues.     She had facial redness again after the scan (typically the next day) but it did not bother her much so we will not change anything in that regard next time. She has steroid for the next scan and takes that.    She is approaching 4 years post Chemotherapy (August 2015) and is doing well.      We will plan to restage about 12-3 when she comes back from [place].     I am not sure of the cause of the MCP pain and perhaps a hand surgeon opinion would be useful.     All questions were addressed and she will call if other questions arise.      Rob Wiseman M.D.  Professor  Hematology, Oncology and Transplantation

## 2019-05-28 NOTE — LETTER
5/28/2019       RE: Shiela Hewitt  [address] [facility] [address]     Dear Colleague,    Thank you for referring your patient, Shiela Hewitt, to the [facility]. Please see a copy of my visit note below.    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   -  Background  In brief, she noted an abdominal mass at the end of 2013, which was quite large and was resected on 01/28/2014. This tumor was about 26 cm in greatest diameter, high-grade, with necrosis present. In retrospect, she had had a CT scan done about 2 years before that, which was felt to be negative. In 08/2014, repeat imaging revealed a 3.5 cm mass near the psoas, and she received preoperative radiation therapy for this. 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 A new margin was obtained, but this was still positive for liposarcoma. All visible tumor was removed.      A recurrence was noted in the abd and she began lipodox/ifos about 3-19-15 and had 6 cycles; the last in August 2015.  -  She was admitted for partial SBO earlier in 2018.  -      Interval history     Sandro is doing pretty very well now.     Vertigo is OK now.  In June 2018 she had several episodes of vertigo and developed noise in her R ear requiring hospitalization.  She is not having vertigo now but has R hearing loss.     She is now off imodium.  Typically she has about 6 (sometimes 12) loose stools/d.         She has occ GERD.  She is off omeprazole.     She usually stays in [place] from January through May. She plans to drive back to [place]  Sept     R MCP 2 developed pain acutely one morning in [place].  She does play golf and holding the club can make it worse.    Right thigh is also numb since the second surgery.    She has some weakness in the right leg , stable, and if that does not affect her life.  She can walk a mile at least with no  "problem but with the ear she sometimes has some balance problems.     She has a longstanding history of hypothyroidism on replacement T4, and some seasonal allergies. She was also noted to have an antiphospholipid antibody. This was checked because her sister presented with a mesenteric artery clot.    No longer on PM trazadone and sleep not perfect w hot flashes and some feet sensations.     Her 10-point ROS is otherwise unremarkable      -  Background PMH, FH, SH  PAST MEDICAL HISTORY: She did have a tonsillectomy at age 15 and some sinus surgery in about 1994 along with a tubal pregnancy and a tubal ligation.   ALLERGIES: She does describe 1 hive after IV contrast material about 20 years ago, so she has been taking prednisone and Benadryl for contrast scans. She does have some itching with Dilaudid, but still uses it.   SOCIAL HISTORY: She is a never smoker and does not abuse alcohol or other drugs. 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  -          On exam she appeared comfortable with normal affect.   /64 (BP Location: Left arm, Patient Position: Sitting, Cuff Size: Adult Large)   Pulse 77   Temp 99.2  F (37.3  C) (Oral)   Resp 18   Ht 1.74 m (5' 8.5\")   Wt 78.2 kg (172 lb 6.4 oz)   SpO2 98%   BMI 25.83 kg/m     HEENT No icterus.   NECK No thyromegaly or neck mass.  CHEST clear chest.   CV RRR w no sig murmur.  ABD No HSMT.  LYMPH No lymphadenopathy.  NEURO  Romberg slightly shaky but OK, heel/toe walk OK.  EXT No edema.    PSYCH Mood good.  FOCUSED EXAM No synovitis evident in R hand, some pain around MCP 2 that is augmented by making a fist; burning sensation.    -  CBC, LFT, GNE OK though  .    -   It was felt that on her outside imaging pre chemo that there seemed to be a recurrence in the abd; this was a complicated image. This region on the psoas seemed a bit more prominent on 3-14 c/w Feb, and on the 3-31 is less prominent. We dont " have a biopsy of this but the series was c/w tumor response. The CT of 2015 showed some colitis.      I reviewed her new CT images today and report and there is no clear PD and that is the formal read; she does have some small lung nodules stable since June 2018.      -  A/P   We discussed a number of issues.     She had facial redness again after the scan (typically the next day) but it did not bother her much so we will not change anything in that regard next time. She has steroid for the next scan and takes that.    She is  approaching 4 years post Chemotherapy (August 2015) and is doing well.      We will plan to restage about 12-3 when she comes back from [place].     I am not sure of the cause of the MCP pain and perhaps a hand surgeon opinion would be useful.     All questions were addressed and she will call if other questions arise.      Rob Wiseman M.D.  Professor  Hematology, Oncology and Transplantation        Again, thank you for allowing me to participate in the care of your patient.      Sincerely,    Rob Wiseman MD

## 2019-05-28 NOTE — NURSING NOTE
"Oncology Rooming Note    May 28, 2019 9:47 AM   Shiela Hewitt is a 63 year old female who presents for:    Chief Complaint   Patient presents with     Oncology Clinic Visit     Return visit related to Liposarcoma     Initial Vitals: /64 (BP Location: Left arm, Patient Position: Sitting, Cuff Size: Adult Large)   Pulse 77   Temp 99.2  F (37.3  C) (Oral)   Resp 18   Ht 1.74 m (5' 8.5\")   Wt 78.2 kg (172 lb 6.4 oz)   SpO2 98%   BMI 25.83 kg/m   Estimated body mass index is 25.83 kg/m  as calculated from the following:    Height as of this encounter: 1.74 m (5' 8.5\").    Weight as of this encounter: 78.2 kg (172 lb 6.4 oz). Body surface area is 1.94 meters squared.  No Pain (0) Comment: Data Unavailable   No LMP recorded. Patient is postmenopausal.  Allergies reviewed: Yes  Medications reviewed: Yes    Medications: Medication refills not needed today.  Pharmacy name entered into [facility]:    [facility] - [place], [place] - [id number]  [id number] - [place], [place] - [address] [id number] - [place], [place] - [id number]    Clinical concerns: No new concerns. Provider was notified.      Maria Fernanda Chan LPN            "

## 2019-06-03 ENCOUNTER — OFFICE VISIT (OUTPATIENT)
Dept: FAMILY MEDICINE | Facility: CLINIC | Age: 64
End: 2019-06-03
Payer: COMMERCIAL

## 2019-06-03 VITALS
BODY MASS INDEX: 25.27 KG/M2 | DIASTOLIC BLOOD PRESSURE: 70 MMHG | SYSTOLIC BLOOD PRESSURE: 109 MMHG | TEMPERATURE: 98.1 F | OXYGEN SATURATION: 97 % | WEIGHT: 170.6 LBS | HEIGHT: 69 IN | HEART RATE: 74 BPM

## 2019-06-03 DIAGNOSIS — K21.9 GASTROESOPHAGEAL REFLUX DISEASE, ESOPHAGITIS PRESENCE NOT SPECIFIED: ICD-10-CM

## 2019-06-03 DIAGNOSIS — Z00.01 ENCOUNTER FOR ROUTINE ADULT MEDICAL EXAM WITH ABNORMAL FINDINGS: Primary | ICD-10-CM

## 2019-06-03 DIAGNOSIS — G62.2 POLYNEUROPATHY DUE TO OTHER TOXIC AGENTS (H): ICD-10-CM

## 2019-06-03 DIAGNOSIS — H81.10 BENIGN PAROXYSMAL POSITIONAL VERTIGO, UNSPECIFIED LATERALITY: ICD-10-CM

## 2019-06-03 DIAGNOSIS — R42 VERTIGO: ICD-10-CM

## 2019-06-03 DIAGNOSIS — D68.61 ANTIPHOSPHOLIPID ANTIBODY SYNDROME (H): ICD-10-CM

## 2019-06-03 DIAGNOSIS — E03.4 HYPOTHYROIDISM DUE TO ACQUIRED ATROPHY OF THYROID: ICD-10-CM

## 2019-06-03 DIAGNOSIS — C48.0 LIPOSARCOMA OF RETROPERITONEUM (H): ICD-10-CM

## 2019-06-03 DIAGNOSIS — M51.26 DISPLACEMENT OF LUMBAR INTERVERTEBRAL DISC WITHOUT MYELOPATHY: ICD-10-CM

## 2019-06-03 PROCEDURE — 99396 PREV VISIT EST AGE 40-64: CPT | Performed by: FAMILY MEDICINE

## 2019-06-03 RX ORDER — GABAPENTIN 300 MG/1
300 CAPSULE ORAL 3 TIMES DAILY
Qty: 90 CAPSULE | Refills: 5 | Status: SHIPPED | OUTPATIENT
Start: 2019-06-03 | End: 2020-07-21

## 2019-06-03 RX ORDER — PROCHLORPERAZINE MALEATE 10 MG
10 TABLET ORAL EVERY 6 HOURS PRN
Qty: 20 TABLET | Refills: 1 | Status: SHIPPED | OUTPATIENT
Start: 2019-06-03 | End: 2022-08-01

## 2019-06-03 ASSESSMENT — ENCOUNTER SYMPTOMS
NAUSEA: 0
HEMATURIA: 0
COUGH: 0
ABDOMINAL PAIN: 0
CONSTIPATION: 0
DYSURIA: 0
NERVOUS/ANXIOUS: 0
WEAKNESS: 0
DIARRHEA: 1
SHORTNESS OF BREATH: 1
SORE THROAT: 0
CHILLS: 0
JOINT SWELLING: 0
HEARTBURN: 0
EYE PAIN: 0
MYALGIAS: 1
FEVER: 0
PALPITATIONS: 0
FREQUENCY: 0
HEMATOCHEZIA: 0
PARESTHESIAS: 0
DIZZINESS: 0
ARTHRALGIAS: 1
HEADACHES: 0

## 2019-06-03 ASSESSMENT — ACTIVITIES OF DAILY LIVING (ADL): CURRENT_FUNCTION: NO ASSISTANCE NEEDED

## 2019-06-03 ASSESSMENT — MIFFLIN-ST. JEOR: SCORE: 1390.28

## 2019-06-03 NOTE — PROGRESS NOTES
"   SUBJECTIVE:   CC: Shiela Hewitt is an 63 year old woman who presents for preventive health visit.     Healthy Habits:     In general, how would you rate your overall health?  Good    Frequency of exercise:  2-3 days/week    Duration of exercise:  30-45 minutes    Do you usually eat at least 4 servings of fruit and vegetables a day, include whole grains    & fiber and avoid regularly eating high fat or \"junk\" foods?  No    Taking medications regularly:  Yes    Medication side effects:  None    Ability to successfully perform activities of daily living:  No assistance needed    Home Safety:  No safety concerns identified    Hearing Impairment:  Difficulty following a conversation in a noisy restaurant or crowded room, need to ask people to speak up or repeat themselves and difficulty understanding soft or whispered speech    In the past 6 months, have you been bothered by leaking of urine?  No    In general, how would you rate your overall mental or emotional health?  Good      PHQ-2 Total Score: 0    Additional concerns today:  Yes      Hypothyroidism Follow-up      Since last visit, patient describes the following symptoms: Weight stable, no hair loss, no skin changes, no constipation, no loose stools  TSH   Date Value Ref Range Status   11/27/2018 0.75 0.40 - 4.00 mU/L Final           HPI:  She has a burning pain in her right second MCP joint.  She also has some wrist pain on the right.  The pain has been on and off for about 3 months.  It comes and goes for about 3 days at a time.     She had a slightly elevated glucose of 127 through her oncologist on 5/24/19.   She was fasting.  She has had two normal A1c in the last two years.      She has liposarcoma of the retroperitoneum.  She has been seeing oncology for this.  She had a Ct scan last month.      She gets some knee pain in the left.  This is on and offf.  She gets arjun horses a lot in her calves at times.  These are only at night.  She drinks lots of " water.  She has diarrhea from chemo/radiation 4-5 times a day.     She was on trazodone for sleep but it caused a headache.  She is up at night with hot flashes.  She has gabapentin but has not been taking it.  It was 300 mg tid.  She took if for a month.       She has been on compazine for nausea with dizziness.  She has found this to be helpful in the past.  But it has .        Today's PHQ-2 Score:   PHQ-2 (  [employer]) 6/3/2019   Q1: Little interest or pleasure in doing things 0   Q2: Feeling down, depressed or hopeless 0   PHQ-2 Score 0   Q1: Little interest or pleasure in doing things Not at all   Q2: Feeling down, depressed or hopeless Not at all   PHQ-2 Score 0       Abuse: Current or Past(Physical, Sexual or Emotional)- No  Do you feel safe in your environment? Yes    Social History     Tobacco Use     Smoking status: Never Smoker     Smokeless tobacco: Never Used   Substance Use Topics     Alcohol use: Yes     Comment: rarely     If you drink alcohol do you typically have >3 drinks per day or >7 drinks per week? No    Alcohol Use 6/3/2019   Prescreen: >3 drinks/day or >7 drinks/week? No   Prescreen: >3 drinks/day or >7 drinks/week? -   No flowsheet data found.    Reviewed orders with patient.  Reviewed health maintenance and updated orders accordingly - Yes  BP Readings from Last 3 Encounters:   19 109/70   19 104/64   18 105/66    Wt Readings from Last 3 Encounters:   19 77.4 kg (170 lb 9.6 oz)   19 78.2 kg (172 lb 6.4 oz)   18 80.7 kg (177 lb 14.6 oz)                    Mammogram Screening: Patient over age 50, mutual decision to screen reflected in health maintenance.    Pertinent mammograms are reviewed under the imaging tab.  History of abnormal Pap smear:   NO - age 30-65 PAP every 5 years with negative HPV co-testing recommended  Last 3 Pap Results: No results found for: PAP     Reviewed and updated as needed this visit by clinical staff  Tobacco   "Allergies  Med Hx  Surg Hx  Fam Hx  Soc Hx        Reviewed and updated as needed this visit by Provider            Review of Systems   Constitutional: Negative for chills and fever.   HENT: Positive for hearing loss. Negative for congestion, ear pain and sore throat.    Eyes: Negative for pain and visual disturbance.   Respiratory: Positive for shortness of breath. Negative for cough.    Cardiovascular: Negative for chest pain, palpitations and peripheral edema.   Gastrointestinal: Positive for diarrhea. Negative for abdominal pain, constipation, heartburn, hematochezia and nausea.   Genitourinary: Negative for dysuria, frequency, genital sores, hematuria and urgency.   Musculoskeletal: Positive for arthralgias and myalgias. Negative for joint swelling.   Skin: Negative for rash.   Neurological: Negative for dizziness, weakness, headaches and paresthesias.   Psychiatric/Behavioral: Negative for mood changes. The patient is not nervous/anxious.      OBJECTIVE:   /70   Pulse 74   Temp 98.1  F (36.7  C)   Ht 1.74 m (5' 8.5\")   Wt 77.4 kg (170 lb 9.6 oz)   SpO2 97%   BMI 25.56 kg/m    Physical Exam  GENERAL: healthy, alert and no distress  EYES: Eyes grossly normal to inspection, PERRL and conjunctivae and sclerae normal  HENT: ear canals and TM's normal, nose and mouth without ulcers or lesions  NECK: no adenopathy, no asymmetry, masses, or scars and thyroid normal to palpation  RESP: lungs clear to auscultation - no rales, rhonchi or wheezes  BREAST: normal without masses, tenderness or nipple discharge and no palpable axillary masses or adenopathy  CV: regular rate and rhythm, normal S1 S2, no S3 or S4, no murmur, click or rub, no peripheral edema and peripheral pulses strong  ABDOMEN: soft, nontender, no hepatosplenomegaly, no masses and bowel sounds normal  MS: no gross musculoskeletal defects noted, no edema  SKIN: no suspicious lesions or rashes  NEURO: Normal strength and tone, mentation intact " "and speech normal  PSYCH: mentation appears normal, affect normal/bright    Diagnostic Test Results:  Labs reviewed in Epic    ASSESSMENT/PLAN:   1. Encounter for routine adult medical exam with abnormal findings      2. Liposarcoma of retroperitoneum (H)  Followed by oncology     3. Hypothyroidism due to acquired atrophy of thyroid  The current medical regimen is effective;  continue present plan and medications.  TSH   Date Value Ref Range Status   11/27/2018 0.75 0.40 - 4.00 mU/L Final         4. Antiphospholipid antibody syndrome (H)  The current medical regimen is effective;  continue present plan and medications.      5. Benign paroxysmal positional vertigo, unspecified laterality  Stable     6. Gastroesophageal reflux disease, esophagitis presence not specified  The current medical regimen is effective;  continue present plan and medications.      7. Displacement of lumbar intervertebral disc without myelopathy    - tiZANidine (ZANAFLEX) 4 MG tablet; Take 1 tablet (4 mg) by mouth 3 times daily as needed for muscle spasms  Dispense: 20 tablet; Refill: 3    8. Vertigo  The current medical regimen is effective;  continue present plan and medications.    - prochlorperazine (COMPAZINE) 10 MG tablet; Take 1 tablet (10 mg) by mouth every 6 hours as needed for nausea or vomiting  Dispense: 20 tablet; Refill: 1    9. 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    - gabapentin (NEURONTIN) 300 MG capsule; Take 1 capsule (300 mg) by mouth 3 times daily  Dispense: 90 capsule; Refill: 5    COUNSELING:  Reviewed preventive health counseling, as reflected in patient instructions       Regular exercise       Healthy diet/nutrition    Estimated body mass index is 25.56 kg/m  as calculated from the following:    Height as of this encounter: 1.74 m (5' 8.5\").    Weight as of this encounter: 77.4 kg (170 lb 9.6 oz).         reports that she has " never smoked. She has never used smokeless tobacco.      Counseling Resources:  ATP IV Guidelines  Pooled Cohorts Equation Calculator  Breast Cancer Risk Calculator  FRAX Risk Assessment  ICSI Preventive Guidelines  Dietary Guidelines for Americans, 2010  USDA's MyPlate  ASA Prophylaxis  Lung CA Screening    Mindy Vieira DO  [facility]

## 2019-06-03 NOTE — PATIENT INSTRUCTIONS
Start the gabapentin 300 mg three times a day for hot flashes and neuropathy    Mindy Vieira D.O.        Preventive Health Recommendations  Female Ages 50 - 64    Yearly exam: See your health care provider every year in order to  o Review health changes.   o Discuss preventive care.    o Review your medicines if your doctor has prescribed any.      Get a Pap test every three years (unless you have an abnormal result and your provider advises testing more often).    If you get Pap tests with HPV test, you only need to test every 5 years, unless you have an abnormal result.     You do not need a Pap test if your uterus was removed (hysterectomy) and you have not had cancer.    You should be tested each year for STDs (sexually transmitted diseases) if you're at risk.     Have a mammogram every 1 to 2 years.    Have a colonoscopy at age 50, or have a yearly FIT test (stool test). These exams screen for colon cancer.      Have a cholesterol test every 5 years, or more often if advised.    Have a diabetes test (fasting glucose) every three years. If you are at risk for diabetes, you should have this test more often.     If you are at risk for osteoporosis (brittle bone disease), think about having a bone density scan (DEXA).    Shots: Get a flu shot each year. Get a tetanus shot every 10 years.    Nutrition:     Eat at least 5 servings of fruits and vegetables each day.    Eat whole-grain bread, whole-wheat pasta and brown rice instead of white grains and rice.    Get adequate Calcium and Vitamin D.     Lifestyle    Exercise at least 150 minutes a week (30 minutes a day, 5 days a week). This will help you control your weight and prevent disease.    Limit alcohol to one drink per day.    No smoking.     Wear sunscreen to prevent skin cancer.     See your dentist every six months for an exam and cleaning.    See your eye doctor every 1 to 2 years.          Patient Education     Diet: Diabetes  Food is an important tool that  you can use to control diabetes and stay healthy. Eating well-balanced meals in the correct amounts will help you control your blood glucose levels and prevent low blood sugar reactions. It will also help you reduce the health risks of diabetes. There is no one specific diet that is right for everyone with diabetes. But there are general guidelines to follow. A registered dietitian (OSMEL) will create a tailored diet approach that s just right for you. He or she will also help you plan healthy meals and snacks. If you have any questions, call your dietitian for advice.     Guidelines for success  Talk with your healthcare provider before starting a diabetes diet or weight loss program. If you haven't talked with a dietitian yet, ask your provider for a referral. The following guidelines can help you succeed:    Select foods from the 6 food groups below. Your dietitian will help you find food choices within each group. He or she will also show you serving sizes and how many servings you can have at each meal.  ? Grains, beans, and starchy vegetables  ? Vegetables  ? Fruit  ? Milk or yogurt  ? Meat, poultry, fish, or tofu  ? Healthy fats    Check your blood sugar levels as directed by your provider. Take any medicine as prescribed by your provider.    Learn to read food labels and pick the right portion sizes.    Eat only the amount of food in your meal plan. Eat about the same amount of food at regular times each day. Don t skip meals. Eat meals 4 to 5 hours apart, with snacks in between.    Limit alcohol. It raises blood sugar levels. Drink water or calorie-free diet drinks that use safe sweeteners.    Eat less fat to help lower your risk of heart disease. Use nonfat or low-fat dairy products and lean meats. Avoid fried foods. Use cooking oils that are unsaturated, such as olive, canola, or peanut oil.    Talk with your dietitian about safe sugar substitutes.    Avoid added salt. It can contribute to high blood  pressure, which can cause heart disease. People with diabetes already have a risk of high blood pressure and heart disease.    Stay at a healthy weight. If you need to lose weight, cut down on your portion sizes. But don t skip meals. Exercise is an important part of any weight management program. Talk with your provider about an exercise program that s right for you.    For more information about the best diet plan for you, talk with a registered dietitian (RD). To find an RD in your area, contact:  ? Academy of Nutrition and Dietetics www.eatright.org  ? The American Diabetes Association 825-054-4931 www.diabetes.org  Date Last Reviewed: 8/1/2016 2000-2018 [employer]. [address] [place] [address], [place] [address]. All rights reserved. This information is not intended as a substitute for professional medical care. Always follow your healthcare professional's instructions.           Patient Education     Diabetes: Learning About Serving and Portion Sizes     A good rule of thumb: Devote half your plate to vegetables and green salad. Split the other half between protein and starchy carbohydrates. Fruit makes a good dessert.   Servings and portions. What s the difference? These terms can be very confusing. But learning to measure serving sizes can help you figure out how many carbohydrates ( carbs ) and other foods you eat each day. They are also powerful tools for managing your weight.  Servings and portions  Many different words are used to describe amounts of food. If your health care provider uses a term you re not sure of, don t be afraid to ask. It helps to know the difference between servings and portions:    A serving size is a fixed size. Food producers use this term to describe their products. For example, the label on a cereal box could say that 1 cup of dry cereal = 1 serving.    A portion (also called a  helping ) is how much you eat or how much you put on your plate at a meal. For example,  you might eat 2 cups of cereal at breakfast.  Using serving information  The portion you choose to eat (such as 2 cups of cereal) may be more than one serving as listed on the food label (such as 1 cup of cereal). That s why it helps to measure or weigh the food you eat. Because the food label values are based on servings, you ll need to know how many servings you eat at one sitting.       Ounces: 2 to 3 ounces is about the size of your palm. 1 Cup: 1 cup (or a medium-sized piece) is about the size of your fist. 1/2 Cup: 1/2 cup is about the size of your cupped hand.   One tablespoon is about the size of your thumb.  One teaspoon is about the size of the tip of your thumb.  Keeping track of serving sizes  When you re planning for a snack or a meal, keep servings in mind. If you don t have measuring cups or a scale handy, there are ways to  eyeball  serving sizes, such as comparing your food to the size of your hand (see pictures above).  Managing portion sizes  If your weight is a concern, reducing your portions can help. You can eat more than one serving of a food at once. But to keep from eating too much at one meal, learn how to manage your portions. A portion is the amount of each type of food on your plate. See the plate diagram for an example of balanced portions.  Date Last Reviewed: 3/1/2016    2698-0110 [employer]. [address] [place] [address], [place] [address]. All rights reserved. This information is not intended as a substitute for professional medical care. Always follow your healthcare professional's instructions.

## 2019-06-03 NOTE — TELEPHONE ENCOUNTER
"Requested Prescriptions   Pending Prescriptions Disp Refills     tiZANidine (ZANAFLEX) 4 MG tablet [Pharmacy Med Name: TIZANIDINE 4MG TABLETS]   Patient requests 90 days supply    Last Written Prescription Date:  6/3/2019  Last Fill Quantity: 20 tablet,   # refills: 3  Last Office Visit: 6/3/2019  w/ NOEL Vieira  Future Office visit:       Routing refill request to provider for review/approval because:  Drug not on the G, P or  Health refill protocol or controlled substance   300 tablet 3     Sig: TAKE 1 TABLET(4 MG) BY MOUTH THREE TIMES DAILY AS NEEDED FOR MUSCLE SPASMS       There is no refill protocol information for this order                  prochlorperazine (COMPAZINE) 10 MG tablet [Pharmacy Med Name: PROCHLORPERAZINE 10MG TABLETS]  Patient requests 90 days supply    Last Written Prescription Date:  6/3/2019  Last Fill Quantity: 20 tablet,  # refills: 1   Last office visit: 6/3/2019 w/ prescribing provider:  NOEL Vieira   Future Office Visit:     360 tablet 1     Sig: TAKE 1 TABLET(10 MG) BY MOUTH EVERY 6 HOURS AS NEEDED FOR NAUSEA OR VOMITING        Antivertigo/Antiemetic Agents Passed - 6/3/2019 12:10 PM        Passed - Recent (12 mo) or future (30 days) visit within the authorizing provider's specialty     Patient had office visit in the last 12 months or has a visit in the next 30 days with authorizing provider or within the authorizing provider's specialty.  See \"Patient Info\" tab in inbasket, or \"Choose Columns\" in Meds & Orders section of the refill encounter.              Passed - Medication is active on med list        Passed - Patient is 18 years of age or older          "

## 2019-06-04 RX ORDER — PROCHLORPERAZINE MALEATE 10 MG
TABLET ORAL
Qty: 360 TABLET | Refills: 1 | OUTPATIENT
Start: 2019-06-04

## 2019-06-04 NOTE — TELEPHONE ENCOUNTER
Patient requesting 90 day supply of these meds that were prescribed yesterday.  Ok to fill for 90 days?  Dorene Cordoba RN

## 2019-06-05 NOTE — TELEPHONE ENCOUNTER
These are prn medications so I think the amounts given at her OV are sufficient.  If she needs them around the clock she will need more work up.     Mindy Vieira D.O.

## 2019-06-28 PROCEDURE — 25000128 H RX IP 250 OP 636: Performed by: INTERNAL MEDICINE

## 2019-06-28 PROCEDURE — 96523 IRRIG DRUG DELIVERY DEVICE: CPT

## 2019-06-28 RX ORDER — HEPARIN SODIUM (PORCINE) LOCK FLUSH IV SOLN 100 UNIT/ML 100 UNIT/ML
5 SOLUTION INTRAVENOUS ONCE
Status: COMPLETED | OUTPATIENT
Start: 2019-06-28 | End: 2019-06-28

## 2019-06-28 RX ADMIN — HEPARIN 5 ML: 100 SYRINGE at 10:55

## 2019-06-28 NOTE — NURSING NOTE
Chief Complaint   Patient presents with     Port Flush     Port flushed by RN in lab.     Port accessed with 20 gauge gripper needle by RN, line flushed with saline and heparin.  Line de-accessed.    Maura Ardon RN

## 2019-08-02 PROCEDURE — 25000128 H RX IP 250 OP 636: Performed by: INTERNAL MEDICINE

## 2019-08-02 PROCEDURE — 96523 IRRIG DRUG DELIVERY DEVICE: CPT

## 2019-08-02 RX ORDER — HEPARIN SODIUM (PORCINE) LOCK FLUSH IV SOLN 100 UNIT/ML 100 UNIT/ML
5 SOLUTION INTRAVENOUS EVERY 8 HOURS
Status: DISCONTINUED | OUTPATIENT
Start: 2019-08-02 | End: 2019-08-10 | Stop reason: HOSPADM

## 2019-08-02 RX ADMIN — HEPARIN 5 ML: 100 SYRINGE at 10:19

## 2019-10-02 ENCOUNTER — HEALTH MAINTENANCE LETTER (OUTPATIENT)
Age: 64
End: 2019-10-02

## 2019-10-10 PROCEDURE — 96523 IRRIG DRUG DELIVERY DEVICE: CPT

## 2019-10-10 PROCEDURE — 25000128 H RX IP 250 OP 636: Performed by: INTERNAL MEDICINE

## 2019-10-10 RX ORDER — HEPARIN SODIUM (PORCINE) LOCK FLUSH IV SOLN 100 UNIT/ML 100 UNIT/ML
5 SOLUTION INTRAVENOUS
Status: COMPLETED | OUTPATIENT
Start: 2019-10-10 | End: 2019-10-10

## 2019-10-10 RX ADMIN — HEPARIN 5 ML: 100 SYRINGE at 11:04

## 2019-10-10 NOTE — NURSING NOTE
"Chief Complaint   Patient presents with     Port Flush     port flushed by rn.       Port accessed with 20 gauge 3/4\" gripper needle by rn.  Port flushed with NS and heparin then de-accessed.  Pt tolerated well.      Otilia Montano RN      "

## 2019-10-14 DIAGNOSIS — E03.9 HYPOTHYROIDISM, UNSPECIFIED TYPE: ICD-10-CM

## 2019-10-14 NOTE — TELEPHONE ENCOUNTER
"Requested Prescriptions   Pending Prescriptions Disp Refills     levothyroxine (SYNTHROID/LEVOTHROID) 88 MCG tablet [Pharmacy Med Name: LEVOTHYROXINE 0.088MG (88MCG) TAB]  Last Written Prescription Date:  10/11/2018  Last Fill Quantity: 90 tablet,  # refills: 2   Last office visit: 6/3/2019 with prescribing provider:  NOEL Vieira   Future Office Visit:   90 tablet 0     Sig: TAKE 1 TABLET(88 MCG) BY MOUTH DAILY       Thyroid Protocol Passed - 10/14/2019  8:08 AM        Passed - Patient is 12 years or older        Passed - Recent (12 mo) or future (30 days) visit within the authorizing provider's specialty     Patient has had an office visit with the authorizing provider or a provider within the authorizing providers department within the previous 12 mos or has a future within next 30 days. See \"Patient Info\" tab in inbasket, or \"Choose Columns\" in Meds & Orders section of the refill encounter.              Passed - Medication is active on med list        Passed - Normal TSH on file in past 12 months     Recent Labs   Lab Test 11/27/18  1002   TSH 0.75              Passed - No active pregnancy on record     If patient is pregnant or has had a positive pregnancy test, please check TSH.          Passed - No positive pregnancy test in past 12 months     If patient is pregnant or has had a positive pregnancy test, please check TSH.          "

## 2019-10-15 RX ORDER — LEVOTHYROXINE SODIUM 88 UG/1
TABLET ORAL
Qty: 90 TABLET | Refills: 0 | Status: SHIPPED | OUTPATIENT
Start: 2019-10-15 | End: 2019-11-26

## 2019-10-15 NOTE — TELEPHONE ENCOUNTER
Prescription approved per [facility] to the [facility] Refill Protocol.    Herson Sarmiento RN

## 2019-11-26 ENCOUNTER — OFFICE VISIT (OUTPATIENT)
Dept: FAMILY MEDICINE | Facility: CLINIC | Age: 64
End: 2019-11-26
Payer: COMMERCIAL

## 2019-11-26 VITALS
HEIGHT: 69 IN | DIASTOLIC BLOOD PRESSURE: 68 MMHG | WEIGHT: 171.2 LBS | SYSTOLIC BLOOD PRESSURE: 106 MMHG | HEART RATE: 64 BPM | BODY MASS INDEX: 25.36 KG/M2 | TEMPERATURE: 97.7 F

## 2019-11-26 DIAGNOSIS — E03.9 HYPOTHYROIDISM, UNSPECIFIED TYPE: Primary | ICD-10-CM

## 2019-11-26 DIAGNOSIS — K56.609 RECURRENT INTESTINAL OBSTRUCTION (H): ICD-10-CM

## 2019-11-26 PROCEDURE — 99214 OFFICE O/P EST MOD 30 MIN: CPT | Performed by: FAMILY MEDICINE

## 2019-11-26 RX ORDER — LEVOTHYROXINE SODIUM 88 UG/1
TABLET ORAL
Qty: 90 TABLET | Refills: 3 | Status: SHIPPED | OUTPATIENT
Start: 2019-11-26 | End: 2020-11-30

## 2019-11-26 ASSESSMENT — MIFFLIN-ST. JEOR: SCORE: 1388

## 2019-11-26 NOTE — PROGRESS NOTES
"Subjective     Shiela Hewitt is a 64 year old female who presents to clinic today for the following health issues:    HPI   Hypothyroidism Follow-up      Since last visit, patient describes the following symptoms: loose stools due to other issues.     Bowel Obstruction  Three days ago, patient had bowel obstruction that lasted 4 hours. It resolved after vomiting 4 times. She is still having tenderness near the umbilical region. She has had 9 previous bowel obstructions, and has been hospitalized twice for it, where she was given antibiotics. Per patient, the obstructions are brought on when she consumes raisins and oats. She had part of her small bowel and colon removed due to cancer.       Possible Bug Bites  Patient was in [place] last week and was bit a bug on her right arm, near the deltoid. It became itchy, swollen, and it developed into a bruise. She describes the pain as feeling like \"her muscles were being pulled apart.\" The bite and bruise has resolved.       How many servings of fruits and vegetables do you eat daily?  0-1    On average, how many sweetened beverages do you drink each day (Examples: soda, juice, sweet tea, etc.  Do NOT count diet or artificially sweetened beverages)?   Very rarely will patient have a soda.   How many days per week do you miss taking your medication? 1    What makes it hard for you to take your medications?  remembering to take    BP Readings from Last 3 Encounters:   11/26/19 106/68   06/03/19 109/70   05/28/19 104/64    Wt Readings from Last 3 Encounters:   11/26/19 77.7 kg (171 lb 3.2 oz)   06/03/19 77.4 kg (170 lb 9.6 oz)   05/28/19 78.2 kg (172 lb 6.4 oz)        Reviewed and updated as needed this visit by Provider  Allergies  Meds  Problems  Med Hx  Surg Hx       Review of Systems   ROS COMP: Constitutional, HEENT, cardiovascular, pulmonary, gi and gu systems are negative, except as otherwise noted.    This document serves as a record of the services and " "decisions personally performed and made by Mindy Vieira DO. It was created on her behalf by Tatyana Nicholson, a trained medical scribe. The creation of this document is based on the provider's statements to the medical scribe.  Tatyana Nicholson 11:27 AM November 26, 2019      Objective    /68 (BP Location: Right arm, Patient Position: Chair, Cuff Size: Adult Regular)   Pulse 64   Temp 97.7  F (36.5  C) (Oral)   Ht 1.74 m (5' 8.5\")   Wt 77.7 kg (171 lb 3.2 oz)   BMI 25.65 kg/m    Body mass index is 25.65 kg/m .     Physical Exam   GENERAL: healthy, alert and no distress  HENT: mouth without ulcers or lesions  NECK: no adenopathy, no asymmetry, masses, or scars and thyroid normal to palpation  RESP: lungs clear to auscultation - no rales, rhonchi or wheezes  CV: regular rate and rhythm, normal S1 S2, no S3 or S4, no murmur, click or rub, no peripheral edema and peripheral pulses strong  ABDOMEN: soft, no hepatosplenomegaly, no masses and bowel sounds normal. tenderness in LLQ and periam   SKIN: no suspicious lesions or rashes to visible skin   NEURO: Normal strength and tone, mentation intact and speech normal  PSYCH: mentation appears normal, affect normal/bright        Assessment & Plan       ICD-10-CM    1. Hypothyroidism, unspecified type E03.9 levothyroxine (SYNTHROID/LEVOTHROID) 88 MCG tablet     **TSH with free T4 reflex FUTURE 2mo   2. Recurrent intestinal obstruction (H) K56.609      Thyroid lab will be checked next week at a different clinic because she has a port which we cannot access at the clinic.       Patient Instructions   Get medicine after blood work is checked.       No follow-ups on file.     The information in this document, created by the medical scribe, Tatyana Nicholson, for me, accurately reflects the services I personally performed and the decisions made by me. I have reviewed and approved this document for accuracy.  November 26, 2019 11:34 AM    DO CHENG Mcclure " [facility]

## 2019-12-02 ENCOUNTER — TELEPHONE (OUTPATIENT)
Dept: FAMILY MEDICINE | Facility: CLINIC | Age: 64
End: 2019-12-02

## 2019-12-02 DIAGNOSIS — M51.26 DISPLACEMENT OF LUMBAR INTERVERTEBRAL DISC WITHOUT MYELOPATHY: ICD-10-CM

## 2019-12-02 NOTE — TELEPHONE ENCOUNTER
Right lower back, tweaked back by shoveling snow yesterday.  She has tried motrin, ice and heat. She is requesting a refill of tizanidine to help with the spasms she is feeling.      The last time this was ordered was 6/3/2019 with 3 refills    Called her pharmacy as she seemed confused that she had refills.  [facility] will process a refill of this medication for her and will reach out once ready to .    Arleth Gallardo RN

## 2019-12-02 NOTE — TELEPHONE ENCOUNTER
Reason for call:  Patient reporting a symptom    Symptom or request:  Patient was shoveling snow yesterday and tweaked her back.  Patient has a history of back pain and Dr Vieira has prescribed muscle relaxers before in the past for her.  Patient was hoping Dr Vieira would send in some muscle relaxers for her.    Duration (how long have symptoms been present):  Since yesterday    Have you been treated for this before? Yes    Additional comments:     Phone Number patient can be reached at:  Home number on file [phone] (home)    Best Time:  any    Can we leave a detailed message on this number:  YES    Call taken on 12/2/2019 at 9:19 AM by Mirta Luque

## 2019-12-03 ENCOUNTER — HOSPITAL ENCOUNTER (OUTPATIENT)
Dept: CT IMAGING | Facility: CLINIC | Age: 64
Discharge: HOME | End: 2019-12-03
Attending: INTERNAL MEDICINE | Admitting: INTERNAL MEDICINE
Payer: COMMERCIAL

## 2019-12-03 DIAGNOSIS — H81.11 BENIGN PAROXYSMAL POSITIONAL VERTIGO OF RIGHT EAR: ICD-10-CM

## 2019-12-03 DIAGNOSIS — M25.541 ARTHRALGIA OF RIGHT HAND: ICD-10-CM

## 2019-12-03 DIAGNOSIS — E03.4 HYPOTHYROIDISM DUE TO ACQUIRED ATROPHY OF THYROID: ICD-10-CM

## 2019-12-03 DIAGNOSIS — E03.9 HYPOTHYROIDISM, UNSPECIFIED TYPE: ICD-10-CM

## 2019-12-03 DIAGNOSIS — D68.61 ANTIPHOSPHOLIPID ANTIBODY SYNDROME (H): ICD-10-CM

## 2019-12-03 DIAGNOSIS — C48.0 LIPOSARCOMA OF RETROPERITONEUM (H): ICD-10-CM

## 2019-12-03 DIAGNOSIS — K21.9 GASTROESOPHAGEAL REFLUX DISEASE WITHOUT ESOPHAGITIS: ICD-10-CM

## 2019-12-03 LAB
ALBUMIN SERPL-MCNC: 4 G/DL (ref 3.4–5)
ALP SERPL-CCNC: 84 U/L (ref 40–150)
ALT SERPL W P-5'-P-CCNC: 30 U/L (ref 0–50)
ANION GAP SERPL CALCULATED.3IONS-SCNC: 5 MMOL/L (ref 3–14)
AST SERPL W P-5'-P-CCNC: 16 U/L (ref 0–45)
BASOPHILS # BLD AUTO: 0 10E9/L (ref 0–0.2)
BASOPHILS NFR BLD AUTO: 0 %
BILIRUB SERPL-MCNC: 0.6 MG/DL (ref 0.2–1.3)
BUN SERPL-MCNC: 22 MG/DL (ref 7–30)
CALCIUM SERPL-MCNC: 9.1 MG/DL (ref 8.5–10.1)
CHLORIDE SERPL-SCNC: 108 MMOL/L (ref 94–109)
CO2 SERPL-SCNC: 26 MMOL/L (ref 20–32)
CREAT SERPL-MCNC: 0.87 MG/DL (ref 0.52–1.04)
DIFFERENTIAL METHOD BLD: ABNORMAL
EOSINOPHIL # BLD AUTO: 0 10E9/L (ref 0–0.7)
EOSINOPHIL NFR BLD AUTO: 0 %
ERYTHROCYTE [DISTWIDTH] IN BLOOD BY AUTOMATED COUNT: 12.9 % (ref 10–15)
GFR SERPL CREATININE-BSD FRML MDRD: 70 ML/MIN/{1.73_M2}
GLUCOSE SERPL-MCNC: 109 MG/DL (ref 70–99)
HCT VFR BLD AUTO: 38.9 % (ref 35–47)
HGB BLD-MCNC: 13.1 G/DL (ref 11.7–15.7)
IMM GRANULOCYTES # BLD: 0 10E9/L (ref 0–0.4)
IMM GRANULOCYTES NFR BLD: 0.4 %
LYMPHOCYTES # BLD AUTO: 0.6 10E9/L (ref 0.8–5.3)
LYMPHOCYTES NFR BLD AUTO: 8.2 %
MCH RBC QN AUTO: 33.3 PG (ref 26.5–33)
MCHC RBC AUTO-ENTMCNC: 33.7 G/DL (ref 31.5–36.5)
MCV RBC AUTO: 99 FL (ref 78–100)
MONOCYTES # BLD AUTO: 0.1 10E9/L (ref 0–1.3)
MONOCYTES NFR BLD AUTO: 1.3 %
NEUTROPHILS # BLD AUTO: 6.8 10E9/L (ref 1.6–8.3)
NEUTROPHILS NFR BLD AUTO: 90.1 %
NRBC # BLD AUTO: 0 10*3/UL
NRBC BLD AUTO-RTO: 0 /100
PLATELET # BLD AUTO: 195 10E9/L (ref 150–450)
POTASSIUM SERPL-SCNC: 4 MMOL/L (ref 3.4–5.3)
PROT SERPL-MCNC: 7.2 G/DL (ref 6.8–8.8)
RBC # BLD AUTO: 3.93 10E12/L (ref 3.8–5.2)
SODIUM SERPL-SCNC: 139 MMOL/L (ref 133–144)
TSH SERPL DL<=0.005 MIU/L-ACNC: 0.55 MU/L (ref 0.4–4)
WBC # BLD AUTO: 7.6 10E9/L (ref 4–11)

## 2019-12-03 PROCEDURE — 25000128 H RX IP 250 OP 636: Performed by: RADIOLOGY

## 2019-12-03 PROCEDURE — 85025 COMPLETE CBC W/AUTO DIFF WBC: CPT | Performed by: FAMILY MEDICINE

## 2019-12-03 PROCEDURE — 80053 COMPREHEN METABOLIC PANEL: CPT | Performed by: FAMILY MEDICINE

## 2019-12-03 PROCEDURE — 74177 CT ABD & PELVIS W/CONTRAST: CPT

## 2019-12-03 PROCEDURE — 71260 CT THORAX DX C+: CPT

## 2019-12-03 PROCEDURE — 25000128 H RX IP 250 OP 636: Performed by: INTERNAL MEDICINE

## 2019-12-03 PROCEDURE — 84443 ASSAY THYROID STIM HORMONE: CPT | Performed by: FAMILY MEDICINE

## 2019-12-03 RX ORDER — IOPAMIDOL 755 MG/ML
105 INJECTION, SOLUTION INTRAVASCULAR ONCE
Status: COMPLETED | OUTPATIENT
Start: 2019-12-03 | End: 2019-12-03

## 2019-12-03 RX ORDER — HEPARIN SODIUM (PORCINE) LOCK FLUSH IV SOLN 100 UNIT/ML 100 UNIT/ML
5 SOLUTION INTRAVENOUS ONCE
Status: COMPLETED | OUTPATIENT
Start: 2019-12-03 | End: 2019-12-03

## 2019-12-03 RX ORDER — HEPARIN SODIUM (PORCINE) LOCK FLUSH IV SOLN 100 UNIT/ML 100 UNIT/ML
100 SOLUTION INTRAVENOUS ONCE
Status: COMPLETED | OUTPATIENT
Start: 2019-12-03 | End: 2019-12-03

## 2019-12-03 RX ADMIN — IOPAMIDOL 105 ML: 755 INJECTION, SOLUTION INTRAVENOUS at 09:59

## 2019-12-03 RX ADMIN — HEPARIN 5 ML: 100 SYRINGE at 09:33

## 2019-12-03 RX ADMIN — Medication 100 ML: at 10:01

## 2019-12-03 NOTE — NURSING NOTE
Chief Complaint   Patient presents with     Port Draw     Labs drawn via port by RN in lab. VS taken. Pt checked in for next appt.      Port accessed with 20g gripper needle by RN, labs collected, line flushed with saline and heparin.  Vitals taken. Pt checked in for appointment(s).    Dionne MURRIETA RN PHN BSN  BMT/Oncology Lab

## 2019-12-04 NOTE — PROGRESS NOTES
12-5-19     All questions were addressed and she will call if other questions arise.      Rob Wiseman M.D.  Professor  Hematology, Oncology and Transplantation

## 2019-12-05 ENCOUNTER — ONCOLOGY VISIT (OUTPATIENT)
Dept: ONCOLOGY | Facility: CLINIC | Age: 64
End: 2019-12-05
Attending: INTERNAL MEDICINE
Payer: COMMERCIAL

## 2019-12-05 VITALS
BODY MASS INDEX: 25.18 KG/M2 | DIASTOLIC BLOOD PRESSURE: 71 MMHG | SYSTOLIC BLOOD PRESSURE: 107 MMHG | HEIGHT: 69 IN | WEIGHT: 170 LBS | RESPIRATION RATE: 14 BRPM | OXYGEN SATURATION: 98 % | HEART RATE: 62 BPM | TEMPERATURE: 97.7 F

## 2019-12-05 DIAGNOSIS — K21.9 GASTROESOPHAGEAL REFLUX DISEASE WITHOUT ESOPHAGITIS: ICD-10-CM

## 2019-12-05 DIAGNOSIS — E03.4 HYPOTHYROIDISM DUE TO ACQUIRED ATROPHY OF THYROID: ICD-10-CM

## 2019-12-05 DIAGNOSIS — Z86.19 HISTORY OF HERPES GENITALIS: ICD-10-CM

## 2019-12-05 DIAGNOSIS — C48.0 LIPOSARCOMA OF RETROPERITONEUM (H): Primary | ICD-10-CM

## 2019-12-05 PROCEDURE — G0463 HOSPITAL OUTPT CLINIC VISIT: HCPCS | Mod: ZF

## 2019-12-05 PROCEDURE — 99214 OFFICE O/P EST MOD 30 MIN: CPT | Mod: ZP | Performed by: INTERNAL MEDICINE

## 2019-12-05 RX ORDER — ACYCLOVIR 50 MG/G
OINTMENT TOPICAL
COMMUNITY
Start: 2017-05-24

## 2019-12-05 RX ORDER — LORATADINE 10 MG/1
TABLET ORAL
COMMUNITY
End: 2020-11-30

## 2019-12-05 RX ORDER — MECLIZINE HYDROCHLORIDE 25 MG/1
25 TABLET ORAL 3 TIMES DAILY PRN
COMMUNITY
Start: 2011-10-19

## 2019-12-05 ASSESSMENT — MIFFLIN-ST. JEOR: SCORE: 1382.55

## 2019-12-05 ASSESSMENT — PAIN SCALES - GENERAL: PAINLEVEL: NO PAIN (0)

## 2019-12-05 NOTE — NURSING NOTE
"Oncology Rooming Note    December 5, 2019 10:14 AM   Shiela Hewitt is a 64 year old female who presents for:    Chief Complaint   Patient presents with     Oncology Clinic Visit     Return - Liposarcoma of retroperitoneum      Initial Vitals: Pulse 62   Temp 97.7  F (36.5  C) (Oral)   Resp 14   Ht 1.74 m (5' 8.5\")   Wt 77.1 kg (170 lb)   SpO2 98%   BMI 25.47 kg/m   Estimated body mass index is 25.47 kg/m  as calculated from the following:    Height as of this encounter: 1.74 m (5' 8.5\").    Weight as of this encounter: 77.1 kg (170 lb). Body surface area is 1.93 meters squared.  No Pain (0) Comment: Data Unavailable   No LMP recorded. Patient is postmenopausal.  Allergies reviewed: Yes  Medications reviewed: Yes    Medications: MEDICATION REFILLS NEEDED TODAY. Provider was notified.  Pharmacy name entered into EPIC:    [facility] - [place], [place] - [address] [place] [address] [phone]  [id number] - [place] [address] AT [id number] - [place] [address] AT [address] OF  [phone]    Clinical concerns: Neuropathy has spread to the bottoms of her feet from her toes.    Refill: Methylprednisalone        Tanmay Cruz, EMT              "

## 2019-12-05 NOTE — PROGRESS NOTES
Hematology / Oncology Clinic Note    Date of Service (when I saw the patient): 12/05/2019       Interval History   Ms. Hewitt is doing well overall. She does have issues with chronic diarrhea since her surgeries, but reports that she has learnt to manage it. She feels that she had an episode of SBO while on her drive back from [place] a few weeks ago, with severe abdominal pain and vomiting, but this resolved in about 4 hours and she did not have to present to the emergency room.    HPI    She noted an abdominal mass at the end of 2013, which was quite large and was resected on 01/28/2014. This tumor was about 26 cm in greatest diameter, high-grade, with necrosis present. In retrospect, she had had a CT scan done about 2 years before that, which was felt to be negative. In 08/2014, repeat imaging revealed a 3.5 cm mass near the psoas, and she received preoperative radiation therapy for this. 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 A new margin was obtained, but this was still positive for liposarcoma. All visible tumor was removed  A recurrence was noted in the abd and she began lipodox/ifos about 3-19-15 and had 6 cycles; the last in August 2015.  -  She was admitted for partial SBO earlier in 2018.    Background PMH, FH, SH  PAST MEDICAL HISTORY: She did have a tonsillectomy at age 15 and some sinus surgery in about 1994 along with a tubal pregnancy and a tubal ligation.   ALLERGIES: She does describe 1 hive after IV contrast material about 20 years ago, so she has been taking prednisone and Benadryl for contrast scans. She does have some itching with Dilaudid, but still uses it.   SOCIAL HISTORY: She is a never smoker and does not abuse alcohol or other drugs. 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    Physical Exam   Temp: 97.7  F (36.5  C) Temp src: Oral BP: 107/71 Pulse: 62   Resp: 14  SpO2: 98 %      Vitals:    12/05/19 1012   Weight: 77.1 kg (170 lb)     HEENT No icterus.   NECK No thyromegaly or neck mass.  CHEST clear chest.   CV RRR w no sig murmur.  ABD No HSMT.  LYMPH No lymphadenopathy.  NEURO  Romberg slightly shaky but OK, heel/toe walk OK.  EXT No edema.    PSYCH Mood good.      CT C/A/P  12/3/2019  FINDINGS:   Chest: Stable tiny bilateral lower lobe pulmonary nodules. No new  nodules. Stable minimal apical fibrosis. No pleural or pericardial  effusion. No adenopathy in the chest. Normal caliber aorta. Normal  heart size.     Abdomen and pelvis: Some fatty change of the liver noted. The liver,  spleen, adrenal glands, kidneys, and pancreas demonstrate no worrisome  focal lesion.  There are no dilated loops of small intestine or large  bowel to suggest ileus or obstruction. No free fluid. No free air.  There are no lymph nodes that are abnormal by size criteria.      Survey of the visualized bony structures demonstrates no destructive  bony lesions.                                                                      IMPRESSION: Stable bilateral tiny pulmonary nodules, otherwise no  convincing recurrent or residual malignancy demonstrated in the chest,  abdomen, and pelvis.    We reviewed the images.  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    Her CT C/A/P from 12/2019 shows no evidence of recurrence or metastasis and shows stability of the pulmonary nodules.    She usually stays in [place] Jan -May and returns to [place] in May.    We will continue with q 6 month imaging and visits - scheduled for 5/2020.    Discussed with Dr. Wiseman.    Joslyn Ott MD  PGY4 Fellow. Hematology/Oncology/Transplantation    I  independently examined this patient and my assessment is in agreement with the above note.  I reviewed all tests and past medical history and my evaluation agrees with the findings in the note.     Rob Wiseman M.D.  Professor  Hematology, Oncology and Transplantation

## 2019-12-05 NOTE — LETTER
12/5/2019       RE: Shiela Hewitt  [address] [place] [address]     Dear Colleague,    Thank you for referring your patient, Shiela Hewitt, to the [facility]. Please see a copy of my visit note below.    12-5-19    Hematology / Oncology Clinic Note    Date of Service (when I saw the patient): 12/05/2019    Interval History   Ms. Hewitt is doing well overall. She does have issues with chronic diarrhea since her surgeries, but reports that she has learnt to manage it. She feels that she had an episode of SBO while on her drive back from [place] a few weeks ago, with severe abdominal pain and vomiting, but this resolved in about 4 hours and she did not have to present to the emergency room.    HPI  She noted an abdominal mass at the end of 2013, which was quite large and was resected on 01/28/2014. This tumor was about 26 cm in greatest diameter, high-grade, with necrosis present. In retrospect, she had had a CT scan done about 2 years before that, which was felt to be negative. In 08/2014, repeat imaging revealed a 3.5 cm mass near the psoas, and she received preoperative radiation therapy for this. 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 A new margin was obtained, but this was still positive for liposarcoma. All visible tumor was removed  A recurrence was noted in the abd and she began lipodox/ifos about 3-19-15 and had 6 cycles; the last in August 2015.  -  She was admitted for partial SBO earlier in 2018.    Background PMH, FH, SH  PAST MEDICAL HISTORY: She did have a tonsillectomy at age 15 and some sinus surgery in about 1994 along with a tubal pregnancy and a tubal ligation.   ALLERGIES: She does describe 1 hive after IV contrast material about 20 years ago, so she has been taking prednisone and Benadryl for contrast scans. She does have some itching with Dilaudid, but still uses it.   SOCIAL HISTORY: She  is a never smoker and does not abuse alcohol or other drugs. 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    Physical Exam   Temp: 97.7  F (36.5  C) Temp src: Oral BP: 107/71 Pulse: 62   Resp: 14 SpO2: 98 %      Vitals:    12/05/19 1012   Weight: 77.1 kg (170 lb)     HEENT No icterus.   NECK No thyromegaly or neck mass.  CHEST clear chest.   CV RRR w no sig murmur.  ABD No HSMT.  LYMPH No lymphadenopathy.  NEURO  Romberg slightly shaky but OK, heel/toe walk OK.  EXT No edema.    PSYCH Mood good.      CT C/A/P  12/3/2019  FINDINGS:   Chest: Stable tiny bilateral lower lobe pulmonary nodules. No new  nodules. Stable minimal apical fibrosis. No pleural or pericardial  effusion. No adenopathy in the chest. Normal caliber aorta. Normal  heart size.     Abdomen and pelvis: Some fatty change of the liver noted. The liver,  spleen, adrenal glands, kidneys, and pancreas demonstrate no worrisome  focal lesion.  There are no dilated loops of small intestine or large  bowel to suggest ileus or obstruction. No free fluid. No free air.  There are no lymph nodes that are abnormal by size criteria.      Survey of the visualized bony structures demonstrates no destructive  bony lesions.                                                                      IMPRESSION: Stable bilateral tiny pulmonary nodules, otherwise no  convincing recurrent or residual malignancy demonstrated in the chest,  abdomen, and pelvis.    We reviewed the images.  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    Her CT C/A/P from 12/2019 shows no evidence of recurrence or metastasis and shows stability of the pulmonary nodules.    She usually  stays in [place] Jan -May and returns to [place] in May.    We will continue with q 6 month imaging and visits - scheduled for 5/2020.    Discussed with Dr. Wiseman.    Joslyn Ott MD  PGY4 Fellow. Hematology/Oncology/Transplantation    I independently examined this patient and my assessment is in agreement with the above note.  I reviewed all tests and past medical history and my evaluation agrees with the findings in the note.     Rob Wiseman M.D.  Professor  Hematology, Oncology and Transplantation

## 2020-05-22 DIAGNOSIS — Z91.041 RADIOGRAPHIC DYE ALLERGY STATUS: Primary | ICD-10-CM

## 2020-05-22 RX ORDER — METHYLPREDNISOLONE 32 MG/1
TABLET ORAL
Qty: 2 TABLET | Refills: 0 | Status: SHIPPED | OUTPATIENT
Start: 2020-05-22 | End: 2020-10-29

## 2020-05-22 NOTE — TELEPHONE ENCOUNTER
Pt has CT scan tmrw and has a dye allergy. Needs medrol sent to [facility] on file.     Paged to Montana Bell PA-C    Paged to Dr Wiseman     Methylprednisone 32mg 12 and 2 hours prior to scan.

## 2020-05-23 ENCOUNTER — ANCILLARY PROCEDURE (OUTPATIENT)
Dept: CT IMAGING | Facility: CLINIC | Age: 65
End: 2020-05-23
Attending: INTERNAL MEDICINE
Payer: COMMERCIAL

## 2020-05-23 ENCOUNTER — INFUSION THERAPY VISIT (OUTPATIENT)
Dept: ONCOLOGY | Facility: CLINIC | Age: 65
End: 2020-05-23
Attending: INTERNAL MEDICINE
Payer: COMMERCIAL

## 2020-05-23 DIAGNOSIS — E03.4 HYPOTHYROIDISM DUE TO ACQUIRED ATROPHY OF THYROID: ICD-10-CM

## 2020-05-23 DIAGNOSIS — Z86.19 HISTORY OF HERPES GENITALIS: ICD-10-CM

## 2020-05-23 DIAGNOSIS — K21.9 GASTROESOPHAGEAL REFLUX DISEASE WITHOUT ESOPHAGITIS: ICD-10-CM

## 2020-05-23 DIAGNOSIS — C48.0 LIPOSARCOMA OF RETROPERITONEUM (H): ICD-10-CM

## 2020-05-23 LAB
ALBUMIN SERPL-MCNC: 4.1 G/DL (ref 3.4–5)
ALP SERPL-CCNC: 76 U/L (ref 40–150)
ALT SERPL W P-5'-P-CCNC: 28 U/L (ref 0–50)
ANION GAP SERPL CALCULATED.3IONS-SCNC: 9 MMOL/L (ref 3–14)
AST SERPL W P-5'-P-CCNC: 20 U/L (ref 0–45)
BASOPHILS # BLD AUTO: 0 10E9/L (ref 0–0.2)
BASOPHILS NFR BLD AUTO: 0.2 %
BILIRUB SERPL-MCNC: 0.6 MG/DL (ref 0.2–1.3)
BUN SERPL-MCNC: 21 MG/DL (ref 7–30)
CALCIUM SERPL-MCNC: 9.7 MG/DL (ref 8.5–10.1)
CHLORIDE SERPL-SCNC: 107 MMOL/L (ref 94–109)
CO2 SERPL-SCNC: 25 MMOL/L (ref 20–32)
CREAT SERPL-MCNC: 1.07 MG/DL (ref 0.52–1.04)
DIFFERENTIAL METHOD BLD: ABNORMAL
EOSINOPHIL # BLD AUTO: 0 10E9/L (ref 0–0.7)
EOSINOPHIL NFR BLD AUTO: 0 %
ERYTHROCYTE [DISTWIDTH] IN BLOOD BY AUTOMATED COUNT: 12.2 % (ref 10–15)
GFR SERPL CREATININE-BSD FRML MDRD: 55 ML/MIN/{1.73_M2}
GLUCOSE SERPL-MCNC: 146 MG/DL (ref 70–99)
HCT VFR BLD AUTO: 38.4 % (ref 35–47)
HGB BLD-MCNC: 13.2 G/DL (ref 11.7–15.7)
IMM GRANULOCYTES # BLD: 0 10E9/L (ref 0–0.4)
IMM GRANULOCYTES NFR BLD: 0.2 %
LYMPHOCYTES # BLD AUTO: 0.4 10E9/L (ref 0.8–5.3)
LYMPHOCYTES NFR BLD AUTO: 9.8 %
MCH RBC QN AUTO: 33.8 PG (ref 26.5–33)
MCHC RBC AUTO-ENTMCNC: 34.4 G/DL (ref 31.5–36.5)
MCV RBC AUTO: 99 FL (ref 78–100)
MONOCYTES # BLD AUTO: 0 10E9/L (ref 0–1.3)
MONOCYTES NFR BLD AUTO: 0.9 %
NEUTROPHILS # BLD AUTO: 3.9 10E9/L (ref 1.6–8.3)
NEUTROPHILS NFR BLD AUTO: 88.9 %
NRBC # BLD AUTO: 0 10*3/UL
NRBC BLD AUTO-RTO: 0 /100
PLATELET # BLD AUTO: 187 10E9/L (ref 150–450)
POTASSIUM SERPL-SCNC: 3.7 MMOL/L (ref 3.4–5.3)
PROT SERPL-MCNC: 7.4 G/DL (ref 6.8–8.8)
RBC # BLD AUTO: 3.9 10E12/L (ref 3.8–5.2)
SODIUM SERPL-SCNC: 140 MMOL/L (ref 133–144)
WBC # BLD AUTO: 4.4 10E9/L (ref 4–11)

## 2020-05-23 PROCEDURE — 25000128 H RX IP 250 OP 636: Mod: ZF | Performed by: INTERNAL MEDICINE

## 2020-05-23 PROCEDURE — 36591 DRAW BLOOD OFF VENOUS DEVICE: CPT

## 2020-05-23 PROCEDURE — 80053 COMPREHEN METABOLIC PANEL: CPT | Performed by: INTERNAL MEDICINE

## 2020-05-23 PROCEDURE — 85025 COMPLETE CBC W/AUTO DIFF WBC: CPT | Performed by: INTERNAL MEDICINE

## 2020-05-23 RX ORDER — HEPARIN SODIUM (PORCINE) LOCK FLUSH IV SOLN 100 UNIT/ML 100 UNIT/ML
500 SOLUTION INTRAVENOUS ONCE
Status: COMPLETED | OUTPATIENT
Start: 2020-05-23 | End: 2020-05-23

## 2020-05-23 RX ORDER — IOPAMIDOL 755 MG/ML
104 INJECTION, SOLUTION INTRAVASCULAR ONCE
Status: COMPLETED | OUTPATIENT
Start: 2020-05-23 | End: 2020-05-23

## 2020-05-23 RX ADMIN — Medication 500 UNITS: at 10:49

## 2020-05-23 RX ADMIN — IOPAMIDOL 104 ML: 755 INJECTION, SOLUTION INTRAVASCULAR at 10:39

## 2020-05-23 RX ADMIN — Medication 500 UNITS: at 09:27

## 2020-05-23 NOTE — PROGRESS NOTES
Chief Complaint   Patient presents with     Blood Draw     Port accessed with 20 gauge POWER needle by RN.  Labs drawn without difficulty.  Port heparin locked and left intact for CT scan.     Patient returned after her CT scan.  Port again flushed and heparin locked.  Needle De-accessed.    KG ROMERO RN on 5/23/2020 at 9:32 AM

## 2020-05-26 ENCOUNTER — VIRTUAL VISIT (OUTPATIENT)
Dept: ONCOLOGY | Facility: CLINIC | Age: 65
End: 2020-05-26
Attending: INTERNAL MEDICINE
Payer: COMMERCIAL

## 2020-05-26 DIAGNOSIS — C48.0 LIPOSARCOMA OF RETROPERITONEUM (H): Primary | ICD-10-CM

## 2020-05-26 DIAGNOSIS — K21.9 GASTROESOPHAGEAL REFLUX DISEASE, ESOPHAGITIS PRESENCE NOT SPECIFIED: ICD-10-CM

## 2020-05-26 DIAGNOSIS — R73.09 ELEVATED GLUCOSE: ICD-10-CM

## 2020-05-26 DIAGNOSIS — R79.89 ELEVATED SERUM CREATININE: ICD-10-CM

## 2020-05-26 DIAGNOSIS — K21.9 GASTROESOPHAGEAL REFLUX DISEASE WITHOUT ESOPHAGITIS: ICD-10-CM

## 2020-05-26 DIAGNOSIS — E03.4 HYPOTHYROIDISM DUE TO ACQUIRED ATROPHY OF THYROID: ICD-10-CM

## 2020-05-26 DIAGNOSIS — D68.61 ANTIPHOSPHOLIPID ANTIBODY SYNDROME (H): ICD-10-CM

## 2020-05-26 PROCEDURE — 99214 OFFICE O/P EST MOD 30 MIN: CPT | Mod: 95 | Performed by: INTERNAL MEDICINE

## 2020-05-26 PROCEDURE — 40000114 ZZH STATISTIC NO CHARGE CLINIC VISIT

## 2020-05-26 RX ORDER — OMEPRAZOLE 40 MG/1
40 CAPSULE, DELAYED RELEASE ORAL DAILY
COMMUNITY
End: 2020-08-03

## 2020-05-26 ASSESSMENT — PAIN SCALES - GENERAL: PAINLEVEL: MILD PAIN (2)

## 2020-05-26 NOTE — PROGRESS NOTES
"Shiela Hewitt is a 64 year old female who is being evaluated via a billable video visit.      The patient has been notified of following:     \"This video visit will be conducted via a call between you and your physician/provider. We have found that certain health care needs can be provided without the need for an in-person physical exam.  This service lets us provide the care you need with a video conversation.  If a prescription is necessary we can send it directly to your pharmacy.  If lab work is needed we can place an order for that and you can then stop by our lab to have the test done at a later time.    Video visits are billed at different rates depending on your insurance coverage.  Please reach out to your insurance provider with any questions.    If during the course of the call the physician/provider feels a video visit is not appropriate, you will not be charged for this service.\"    Patient has given verbal consent for Video visit? Yes    How would you like to obtain your AVS? Juwan    Patient would like the video invitation sent by: Text to cell phone: [phone]    Will anyone else be joining your video visit? No         I have reviewed and updated the patient's allergies and medication list.    Concerns: Patient would like to discuss lab results. States glucose and creatinine are high. She states when she was in [place], she went to ED for a small bowel obstruction and states she had high creatinine down there. She also reports blood in urine when down in [place]. Reports pain in abdomen when walking and thinks she may have an infection.     Refills: Prednisone.        Chantel Bruce CMA        Video-Visit Details    Type of service:  Video Visit    Video Start Time: 1002  Video End Time:1124    Originating Location (pt. Location):home    Distant Location (provider location):  [facility]     Platform used for Video Visit: FancyBox        5-26-20      I saw Shiela Hewitt for f/u of a " recurrent abdominal dedifferentiated liposarcoma.   -  Background  In brief, she noted an abdominal mass at the end of 2013, which was quite large and was resected on 01/28/2014. This tumor was about 26 cm in greatest diameter, high-grade, with necrosis present. In retrospect, she had had a CT scan done about 2 years before that, which was felt to be negative. In 08/2014, repeat imaging revealed a 3.5 cm mass near the psoas, and she received preoperative radiation therapy for this. 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 A new margin was obtained, but this was still positive for liposarcoma. All visible tumor was removed.      A recurrence was noted in the abd and she began lipodox/ifos about 3-19-15 and had 6 cycles; the last in August 2015.  -  She was admitted for partial SBO earlier in 2018.  -        Interval history     Sandro is doing pretty very well now.    She had an episode of SBO in [place]  In Feb and creat was 1.07 at that time; FBS was 125.    She had some constipation last weekend w pain; she had some colitis episodes in the past.    She has a history of gi issues; no imodium now.Typically she has about 6 (sometimes 10) loose stools/d.     She has occ GERD;had some bad episodes in [place] when off omeprazole.  She is off omeprazole.    She had an episode of hematuria in [place] and a UC was negative but got 7 days of cipro and it resolved.      She has been active doing golf and walking;got back from [place] a week ago.    Her toes were bothering her and she was given gabapentin that helped the toes and also hot flashes but had thinking issues so went to 300 mg every day.     Vertigo is OK now.  In June 2018 she had several episodes of vertigo and developed noise in her R ear requiring hospitalization.  She is not having vertigo now but has R hearing loss.       She usually stays in [place] from January through May. She plans to drive back to  [place] Sept.     Right thigh is also numb since the second surgery.    She has some weakness in the right leg , stable, and if that does not affect her life.  She can walk a mile at least.     She has a longstanding history of hypothyroidism on replacement T4, and some seasonal allergies. She was also noted to have an antiphospholipid antibody. This was checked because her sister presented with a mesenteric artery clot.     No longer on PM trazadone and sleep not perfect w hot flashes.     Her 14-point ROS is otherwise unremarkable        -  Background PMH, FH, SH  PAST MEDICAL HISTORY: She did have a tonsillectomy at age 15 and some sinus surgery in about 1994 along with a tubal pregnancy and a tubal ligation.   ALLERGIES: She does describe 1 hive after IV contrast material about 20 years ago, so she has been taking prednisone and Benadryl for contrast scans. She does have some itching with Dilaudid, but still uses it.   SOCIAL HISTORY: She is a never smoker and does not abuse alcohol or other drugs. 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  -          On exam she appeared comfortable with normal affect.   CHEST no resp distress  NEURO  normal mentation and speech   PSYCH Mood good.     -  CBC, LFT, GNE OK,though creat 1.07.    Glu 146 - was FBS         -   I reviewed her new CT images today and report and there is no clear PD and that is the formal read; she does have some small lung nodules stable since June 2018.    Formal read:  No evidence for local recurrence or metastatic disease.       -  A/P   We discussed a number of issues.      She has steroid for the next scan and takes that.    Creat is a bit higher (though she says same as in Feb) and glu high.  She will see PCP about these 2 issues shortly.    We discussed other gabapentin dosing is available to consider.    She will try to wean off the prilosec when the gi issues have stabilized.    She  is approaching 5 years post Chemotherapy (August 2015) and is doing well re sarcoma.       We will plan to restage about 11-24 w imaging the day before when she comes back from [place].      All questions were addressed and she will call if other questions arise.      Rob Wiseman M.D.  Professor  Hematology, Oncology and Transplantation

## 2020-05-26 NOTE — LETTER
"    5/26/2020         RE: Shiela Hewitt  [address] [place] [address]        Dear Colleague,    Thank you for referring your patient, Shiela Hewitt, to the [facility]. Please see a copy of my visit note below.    Shiela Hewitt is a 64 year old female who is being evaluated via a billable video visit.      The patient has been notified of following:     \"This video visit will be conducted via a call between you and your physician/provider. We have found that certain health care needs can be provided without the need for an in-person physical exam.  This service lets us provide the care you need with a video conversation.  If a prescription is necessary we can send it directly to your pharmacy.  If lab work is needed we can place an order for that and you can then stop by our lab to have the test done at a later time.    Video visits are billed at different rates depending on your insurance coverage.  Please reach out to your insurance provider with any questions.    If during the course of the call the physician/provider feels a video visit is not appropriate, you will not be charged for this service.\"    Patient has given verbal consent for Video visit? Yes    How would you like to obtain your AVS? [facility]    Patient would like the video invitation sent by: Text to cell phone: [phone]    Will anyone else be joining your video visit? No         I have reviewed and updated the patient's allergies and medication list.    Concerns: Patient would like to discuss lab results. States glucose and creatinine are high. She states when she was in [place], she went to ED for a small bowel obstruction and states she had high creatinine down there. She also reports blood in urine when down in [place]. Reports pain in abdomen when walking and thinks she may have an infection.     Refills: Prednisone.        Chantel Bruce CMA        Video-Visit Details    Type of service:  Video Visit    Video Start " Time: 1002  Video End Time:1124    Originating Location (pt. 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   -  Background  In brief, she noted an abdominal mass at the end of 2013, which was quite large and was resected on 01/28/2014. This tumor was about 26 cm in greatest diameter, high-grade, with necrosis present. In retrospect, she had had a CT scan done about 2 years before that, which was felt to be negative. In 08/2014, repeat imaging revealed a 3.5 cm mass near the psoas, and she received preoperative radiation therapy for this. 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 A new margin was obtained, but this was still positive for liposarcoma. All visible tumor was removed.      A recurrence was noted in the abd and she began lipodox/ifos about 3-19-15 and had 6 cycles; the last in August 2015.  -  She was admitted for partial SBO earlier in 2018.  -        Interval history     Sandro is doing pretty very well now.    She had an episode of SBO in [place]  In Feb and creat was 1.07 at that time; FBS was 125.    She had some constipation last weekend w pain; she had some colitis episodes in the past.    She has a history of gi issues; no imodium now.Typically she has about 6 (sometimes 10) loose stools/d.     She has occ GERD;had some bad episodes in [place] when off omeprazole.  She is off omeprazole.    She had an episode of hematuria in [place] and a UC was negative but got 7 days of cipro and it resolved.      She has been active doing golf and walking;got back from [place] a week ago.    Her toes were bothering her and she was given gabapentin that helped the toes and also hot flashes but had thinking issues so went to 300 mg every day.     Vertigo is OK now.  In June 2018 she  had several episodes of vertigo and developed noise in her R ear requiring hospitalization.  She is not having vertigo now but has R hearing loss.       She usually stays in [place] from January through May. She plans to drive back to [place] Sept.     Right thigh is also numb since the second surgery.    She has some weakness in the right leg , stable, and if that does not affect her life.  She can walk a mile at least.     She has a longstanding history of hypothyroidism on replacement T4, and some seasonal allergies. She was also noted to have an antiphospholipid antibody. This was checked because her sister presented with a mesenteric artery clot.     No longer on PM trazadone and sleep not perfect w hot flashes.     Her 14-point ROS is otherwise unremarkable        -  Background PMH, FH, SH  PAST MEDICAL HISTORY: She did have a tonsillectomy at age 15 and some sinus surgery in about 1994 along with a tubal pregnancy and a tubal ligation.   ALLERGIES: She does describe 1 hive after IV contrast material about 20 years ago, so she has been taking prednisone and Benadryl for contrast scans. She does have some itching with Dilaudid, but still uses it.   SOCIAL HISTORY: She is a never smoker and does not abuse alcohol or other drugs. 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  -          On exam she appeared comfortable with normal affect.   CHEST no resp distress  NEURO  normal mentation and speech   PSYCH Mood good.     -  CBC, LFT, GNE OK,though creat 1.07.    Glu 146 - was FBS         -   I reviewed her new CT images today and report and there is no clear PD and that is the formal read; she does have some small lung nodules stable since June 2018.    Formal read:  No evidence for local recurrence or metastatic disease.       -  A/P   We discussed a number of issues.      She has steroid for the next scan and takes that.    Creat is a bit higher (though she says  same as in Feb) and glu high.  She will see PCP about these 2 issues shortly.    We discussed other gabapentin dosing is available to consider.    She will try to wean off the prilosec when the gi issues have stabilized.    She is approaching 5 years post Chemotherapy (August 2015) and is doing well re sarcoma.       We will plan to restage about 11-24 w imaging the day before when she comes back from [place].      All questions were addressed and she will call if other questions arise.      Rob Wiseman M.D.  Professor  Hematology, Oncology and Transplantation

## 2020-06-01 ENCOUNTER — TELEPHONE (OUTPATIENT)
Dept: FAMILY MEDICINE | Facility: CLINIC | Age: 65
End: 2020-06-01

## 2020-06-01 ENCOUNTER — VIRTUAL VISIT (OUTPATIENT)
Dept: FAMILY MEDICINE | Facility: CLINIC | Age: 65
End: 2020-06-01
Payer: COMMERCIAL

## 2020-06-01 DIAGNOSIS — E11.9 TYPE 2 DIABETES MELLITUS WITHOUT COMPLICATION, WITHOUT LONG-TERM CURRENT USE OF INSULIN (H): ICD-10-CM

## 2020-06-01 DIAGNOSIS — R74.8 ELEVATED CREATINE KINASE: Primary | ICD-10-CM

## 2020-06-01 DIAGNOSIS — R31.0 GROSS HEMATURIA: ICD-10-CM

## 2020-06-01 DIAGNOSIS — Z90.49 HISTORY OF BOWEL RESECTION: ICD-10-CM

## 2020-06-01 PROCEDURE — 99214 OFFICE O/P EST MOD 30 MIN: CPT | Mod: 95 | Performed by: FAMILY MEDICINE

## 2020-06-01 NOTE — PROGRESS NOTES
"Shiela Hewitt is a 64 year old female who is being evaluated via a billable video visit.      The patient has been notified of following:     \"This video visit will be conducted via a call between you and your physician/provider. We have found that certain health care needs can be provided without the need for an in-person physical exam.  This service lets us provide the care you need with a video conversation.  If a prescription is necessary we can send it directly to your pharmacy.  If lab work is needed we can place an order for that and you can then stop by our lab to have the test done at a later time.    Video visits are billed at different rates depending on your insurance coverage.  Please reach out to your insurance provider with any questions.    If during the course of the call the physician/provider feels a video visit is not appropriate, you will not be charged for this service.\"    Patient has given verbal consent for Video visit? Yes    How would you like to obtain your AVS? Juwan    Patient would like the video invitation sent by: Text to cell phone: [phone]   [email or web address]  Patient given website [email or web address], patient was very confused on how to put this website in a browser. Might not be able to complete visit this way.     Will anyone else be joining your video visit? No    Subjective     Shiela Hewitt is a 64 year old female who presents today via video visit for the following health issues:    HPI  Concern - lab results  Onset:     Description:   Patient has had blood in urine    Needs to discuss recent lab results     Blood in urine one time about may 5 2020.  She has back pain but no history of kidney stones.  She had a recent CT which was negative.      She is also concerned about having a low B12 level because she had a bowel resection in the past.  She is requesting to have this lab tested.    Her fasting blood sugars have increased over the last few years from a " "prediabetic range of 113-120 and now her last fasting sugar was 146.  She notes to having a sweet tooth and states that she does not drink pop.    Has had an elevated creatinine as well.  She states that she has chronic diarrhea related to her bowel resection and does not drink very much water.        Reviewed and updated as needed this visit by Provider         Review of Systems   Constitutional, HEENT, cardiovascular, pulmonary, gi and gu systems are negative, except as otherwise noted.      Objective    There were no vitals taken for this visit.  Estimated body mass index is 25.47 kg/m  as calculated from the following:    Height as of 12/5/19: 1.74 m (5' 8.5\").    Weight as of 12/5/19: 77.1 kg (170 lb).  Physical Exam     PSYCH: Mentation appears normal, affect normal/bright, judgement and insight intact, normal speech and appearance well-groomed.      Diagnostic Test Results:  Labs reviewed in Epic        Assessment & Plan       ICD-10-CM    1. Elevated creatine kinase  R74.8 **Basic metabolic panel FUTURE 2mo   2. Type 2 diabetes mellitus without complication, without long-term current use of insulin (H)  E11.9 Hemoglobin A1c     **Basic metabolic panel FUTURE 2mo     AMBULATORY ADULT DIABETES EDUCATOR REFERRAL   3. Gross hematuria  R31.0 UA with Microscopic reflex to Culture   4. History of bowel resection  Z90.49 Vitamin B12     Folate     Patient has a new diagnosis of diabetes type 2.  I referred her for diabetic education.    The patient has elevated creatinine and chronic diarrhea.  She is likely dehydrated from this.  I have asked her to increase her fluid consumption to 2 L daily.  We will recheck her creatinine in 1 week.    She has had gross hematuria approximately 1 month ago.  We will get a UA to check for microscopic hematuria.  She had a recent CT which ruled out kidney stones.    This was changed to a phone visit  Duration 15 minutes    Labs in 1 week    No follow-ups on file.    Mindy Vieira, "   [facility]      Video-Visit Details    Patient states she was unable to figure out how to do the video visit as her  was not home and she does not like to use computers.      No follow-ups on file.       Mindy Vieira DO

## 2020-06-12 DIAGNOSIS — E11.9 TYPE 2 DIABETES MELLITUS WITHOUT COMPLICATION, WITHOUT LONG-TERM CURRENT USE OF INSULIN (H): ICD-10-CM

## 2020-06-12 DIAGNOSIS — R74.8 ELEVATED CREATINE KINASE: ICD-10-CM

## 2020-06-12 DIAGNOSIS — R31.0 GROSS HEMATURIA: ICD-10-CM

## 2020-06-12 DIAGNOSIS — Z90.49 HISTORY OF BOWEL RESECTION: ICD-10-CM

## 2020-06-12 LAB
ALBUMIN UR-MCNC: NEGATIVE MG/DL
ANION GAP SERPL CALCULATED.3IONS-SCNC: 6 MMOL/L (ref 3–14)
APPEARANCE UR: CLEAR
BILIRUB UR QL STRIP: NEGATIVE
BUN SERPL-MCNC: 22 MG/DL (ref 7–30)
CALCIUM SERPL-MCNC: 8.9 MG/DL (ref 8.5–10.1)
CHLORIDE SERPL-SCNC: 106 MMOL/L (ref 94–109)
CO2 SERPL-SCNC: 26 MMOL/L (ref 20–32)
COLOR UR AUTO: YELLOW
CREAT SERPL-MCNC: 0.86 MG/DL (ref 0.52–1.04)
FOLATE SERPL-MCNC: 12.1 NG/ML
GFR SERPL CREATININE-BSD FRML MDRD: 71 ML/MIN/{1.73_M2}
GLUCOSE SERPL-MCNC: 92 MG/DL (ref 70–99)
GLUCOSE UR STRIP-MCNC: NEGATIVE MG/DL
HBA1C MFR BLD: 5.4 % (ref 0–5.6)
HGB UR QL STRIP: NEGATIVE
KETONES UR STRIP-MCNC: NEGATIVE MG/DL
LEUKOCYTE ESTERASE UR QL STRIP: NEGATIVE
MUCOUS THREADS #/AREA URNS LPF: PRESENT /LPF
NITRATE UR QL: NEGATIVE
PH UR STRIP: 5 PH (ref 5–7)
POTASSIUM SERPL-SCNC: 3.4 MMOL/L (ref 3.4–5.3)
RBC #/AREA URNS AUTO: <1 /HPF (ref 0–2)
SODIUM SERPL-SCNC: 138 MMOL/L (ref 133–144)
SOURCE: ABNORMAL
SP GR UR STRIP: 1.02 (ref 1–1.03)
SQUAMOUS #/AREA URNS AUTO: 1 /HPF (ref 0–1)
UROBILINOGEN UR STRIP-MCNC: 0 MG/DL (ref 0–2)
VIT B12 SERPL-MCNC: 199 PG/ML (ref 193–986)
WBC #/AREA URNS AUTO: 1 /HPF (ref 0–5)

## 2020-06-12 PROCEDURE — 81001 URINALYSIS AUTO W/SCOPE: CPT | Performed by: FAMILY MEDICINE

## 2020-06-12 PROCEDURE — 80048 BASIC METABOLIC PNL TOTAL CA: CPT | Performed by: FAMILY MEDICINE

## 2020-06-12 PROCEDURE — 83036 HEMOGLOBIN GLYCOSYLATED A1C: CPT | Performed by: FAMILY MEDICINE

## 2020-06-12 PROCEDURE — 82607 VITAMIN B-12: CPT | Performed by: FAMILY MEDICINE

## 2020-06-12 PROCEDURE — 25000128 H RX IP 250 OP 636: Performed by: FAMILY MEDICINE

## 2020-06-12 PROCEDURE — 82746 ASSAY OF FOLIC ACID SERUM: CPT | Performed by: FAMILY MEDICINE

## 2020-06-12 RX ORDER — HEPARIN SODIUM (PORCINE) LOCK FLUSH IV SOLN 100 UNIT/ML 100 UNIT/ML
5 SOLUTION INTRAVENOUS ONCE
Status: COMPLETED | OUTPATIENT
Start: 2020-06-12 | End: 2020-06-12

## 2020-06-12 RX ADMIN — Medication 5 ML: at 10:08

## 2020-06-12 NOTE — NURSING NOTE
Chief Complaint   Patient presents with     Port Draw     Labs drawn via port by RN in lab.      Port accessed with 20 gauge gripper needle by RN, labs collected, line flushed with saline and heparin. Line de-accessed. Urine specimen collected.    Maura Ardon RN

## 2020-06-16 ENCOUNTER — TELEPHONE (OUTPATIENT)
Dept: FAMILY MEDICINE | Facility: CLINIC | Age: 65
End: 2020-06-16

## 2020-06-16 NOTE — TELEPHONE ENCOUNTER
Diabetes Education Scheduling Outreach #1:    Call to patient to schedule. Patient stated her A1C was in normal range so she wants to hold off on scheduling for now.    Chanelle Jang OnCall  Diabetes and Nutrition Scheduling

## 2020-07-20 DIAGNOSIS — G62.2 POLYNEUROPATHY DUE TO OTHER TOXIC AGENTS (H): ICD-10-CM

## 2020-07-20 PROCEDURE — 96523 IRRIG DRUG DELIVERY DEVICE: CPT

## 2020-07-20 PROCEDURE — 25000128 H RX IP 250 OP 636: Performed by: INTERNAL MEDICINE

## 2020-07-20 RX ORDER — HEPARIN SODIUM (PORCINE) LOCK FLUSH IV SOLN 100 UNIT/ML 100 UNIT/ML
5 SOLUTION INTRAVENOUS ONCE
Status: COMPLETED | OUTPATIENT
Start: 2020-07-20 | End: 2020-07-20

## 2020-07-20 RX ADMIN — Medication 5 ML: at 10:56

## 2020-07-20 NOTE — NURSING NOTE
Port flushed by RN for 6 week maintenance. Positive blood return and locked with heparin.     MYRANDA MAKI RN

## 2020-07-21 RX ORDER — GABAPENTIN 300 MG/1
CAPSULE ORAL
Qty: 90 CAPSULE | Refills: 5 | Status: SHIPPED | OUTPATIENT
Start: 2020-07-21 | End: 2021-06-01

## 2020-07-21 NOTE — TELEPHONE ENCOUNTER
Routing refill request to provider for review/approval because:  Drug not on the [facility] refill protocol     Misa Hackett RN, BSN, PHN  [facility] [place]

## 2020-07-31 ENCOUNTER — APPOINTMENT (OUTPATIENT)
Age: 65
Setting detail: DERMATOLOGY
End: 2020-07-31

## 2020-07-31 DIAGNOSIS — D22 MELANOCYTIC NEVI: ICD-10-CM

## 2020-07-31 DIAGNOSIS — D18.0 HEMANGIOMA: ICD-10-CM

## 2020-07-31 DIAGNOSIS — L82.1 OTHER SEBORRHEIC KERATOSIS: ICD-10-CM

## 2020-07-31 DIAGNOSIS — L91.8 OTHER HYPERTROPHIC DISORDERS OF THE SKIN: ICD-10-CM

## 2020-07-31 DIAGNOSIS — L82.0 INFLAMED SEBORRHEIC KERATOSIS: ICD-10-CM

## 2020-07-31 DIAGNOSIS — L57.8 OTHER SKIN CHANGES DUE TO CHRONIC EXPOSURE TO NONIONIZING RADIATION: ICD-10-CM

## 2020-07-31 PROBLEM — D22.5 MELANOCYTIC NEVI OF TRUNK: Status: ACTIVE | Noted: 2020-07-31

## 2020-07-31 PROBLEM — D18.01 HEMANGIOMA OF SKIN AND SUBCUTANEOUS TISSUE: Status: ACTIVE | Noted: 2020-07-31

## 2020-07-31 PROCEDURE — OTHER COUNSELING: OTHER

## 2020-07-31 PROCEDURE — OTHER SKIN TAG REMOVAL: OTHER

## 2020-07-31 PROCEDURE — OTHER LIQUID NITROGEN: OTHER

## 2020-07-31 PROCEDURE — 17110 DESTRUCT B9 LESION 1-14: CPT

## 2020-07-31 PROCEDURE — 11200 RMVL SKIN TAGS UP TO&INC 15: CPT | Mod: 59

## 2020-07-31 PROCEDURE — 99214 OFFICE O/P EST MOD 30 MIN: CPT | Mod: 25

## 2020-07-31 ASSESSMENT — LOCATION SIMPLE DESCRIPTION DERM
LOCATION SIMPLE: RIGHT ANTERIOR NECK
LOCATION SIMPLE: LEFT PRETIBIAL REGION
LOCATION SIMPLE: LEFT LIP
LOCATION SIMPLE: GLUTEAL CLEFT
LOCATION SIMPLE: RIGHT LIP
LOCATION SIMPLE: LEFT BUTTOCK
LOCATION SIMPLE: UPPER BACK
LOCATION SIMPLE: RIGHT UPPER BACK
LOCATION SIMPLE: ABDOMEN
LOCATION SIMPLE: SCALP
LOCATION SIMPLE: LEFT LOWER BACK

## 2020-07-31 ASSESSMENT — LOCATION DETAILED DESCRIPTION DERM
LOCATION DETAILED: RIGHT SUPERIOR UPPER BACK
LOCATION DETAILED: LEFT UPPER CUTANEOUS LIP
LOCATION DETAILED: RIGHT SUPERIOR MEDIAL UPPER BACK
LOCATION DETAILED: SUPERIOR THORACIC SPINE
LOCATION DETAILED: RIGHT SUPERIOR ANTERIOR NECK
LOCATION DETAILED: RIGHT CENTRAL PARIETAL SCALP
LOCATION DETAILED: LEFT BUTTOCK
LOCATION DETAILED: RIGHT UPPER CUTANEOUS LIP
LOCATION DETAILED: LEFT SUPERIOR MEDIAL LOWER BACK
LOCATION DETAILED: LEFT RIB CAGE
LOCATION DETAILED: GLUTEAL CLEFT
LOCATION DETAILED: LEFT LATERAL PROXIMAL PRETIBIAL REGION

## 2020-07-31 ASSESSMENT — LOCATION ZONE DERM
LOCATION ZONE: NECK
LOCATION ZONE: SCALP
LOCATION ZONE: TRUNK
LOCATION ZONE: LEG
LOCATION ZONE: LIP

## 2020-07-31 NOTE — PROCEDURE: SKIN TAG REMOVAL
Include Z78.9 (Other Specified Conditions Influencing Health Status) As An Associated Diagnosis?: No
Medical Necessity Clause: This procedure was medically necessary because the lesions that were treated were:
Medical Necessity Information: It is in your best interest to select a reason for this procedure from the list below. All of these items fulfill various CMS LCD requirements except the new and changing color options.
Detail Level: Detailed
Consent: Written consent obtained and the risks of skin tag removal was reviewed with the patient including but not limited to bleeding, pigmentary change, infection, pain, and remote possibility of scarring.
Add Associated Diagnoses If Applicable When Selecting Medical Necessity: Yes
Anesthesia Volume In Cc: 3

## 2020-08-10 ENCOUNTER — ANCILLARY PROCEDURE (OUTPATIENT)
Dept: MAMMOGRAPHY | Facility: CLINIC | Age: 65
End: 2020-08-10
Attending: FAMILY MEDICINE
Payer: COMMERCIAL

## 2020-08-10 DIAGNOSIS — Z12.31 VISIT FOR SCREENING MAMMOGRAM: ICD-10-CM

## 2020-08-10 PROCEDURE — 77067 SCR MAMMO BI INCL CAD: CPT | Mod: TC

## 2020-08-10 PROCEDURE — 77063 BREAST TOMOSYNTHESIS BI: CPT | Mod: TC

## 2020-08-30 ENCOUNTER — INFUSION THERAPY VISIT (OUTPATIENT)
Dept: ONCOLOGY | Facility: CLINIC | Age: 65
End: 2020-08-30
Attending: INTERNAL MEDICINE
Payer: COMMERCIAL

## 2020-08-30 DIAGNOSIS — C48.0 LIPOSARCOMA OF RETROPERITONEUM (H): Primary | ICD-10-CM

## 2020-08-30 PROCEDURE — 25000128 H RX IP 250 OP 636: Mod: ZF | Performed by: INTERNAL MEDICINE

## 2020-08-30 RX ORDER — HEPARIN SODIUM (PORCINE) LOCK FLUSH IV SOLN 100 UNIT/ML 100 UNIT/ML
500 SOLUTION INTRAVENOUS ONCE
Status: COMPLETED | OUTPATIENT
Start: 2020-08-30 | End: 2020-08-30

## 2020-08-30 RX ADMIN — Medication 500 UNITS: at 10:06

## 2020-08-30 NOTE — PROGRESS NOTES
Patient here for a Port Flush.  Port accessed by RN without difficulty.  Port with brisk blood return.  Flushed port with NS and Heparin locked per protocol.  Needle de-accessed.  Patient states she will call to schedule her next appointment.    KG ROMERO RN on 8/30/2020 at 10:10 AM

## 2020-10-29 DIAGNOSIS — Z91.041 RADIOGRAPHIC DYE ALLERGY STATUS: ICD-10-CM

## 2020-10-29 RX ORDER — METHYLPREDNISOLONE 32 MG/1
TABLET ORAL
Qty: 2 TABLET | Refills: 0 | Status: SHIPPED | OUTPATIENT
Start: 2020-10-29 | End: 2020-11-30

## 2020-11-02 ENCOUNTER — TELEPHONE (OUTPATIENT)
Dept: FAMILY MEDICINE | Facility: CLINIC | Age: 65
End: 2020-11-02

## 2020-11-02 DIAGNOSIS — Z13.6 CARDIOVASCULAR SCREENING; LDL GOAL LESS THAN 100: ICD-10-CM

## 2020-11-02 DIAGNOSIS — C48.0 LIPOSARCOMA OF RETROPERITONEUM (H): Primary | ICD-10-CM

## 2020-11-02 DIAGNOSIS — E11.9 TYPE 2 DIABETES MELLITUS WITHOUT COMPLICATION, WITHOUT LONG-TERM CURRENT USE OF INSULIN (H): ICD-10-CM

## 2020-11-02 DIAGNOSIS — E03.4 HYPOTHYROIDISM DUE TO ACQUIRED ATROPHY OF THYROID: ICD-10-CM

## 2020-11-02 NOTE — TELEPHONE ENCOUNTER
Reason for Call: Request for an order or referral:    Order or referral being requested:  Patient is requesting for Dr Vieira to place lab orders for her glucose and thyroid. Patient is getting some lab work done at the [facility] at the end of November was just wanting to get it all done together.      Date needed: as soon as possible    Has the patient been seen by the PCP for this problem? YES    Additional comments:     Phone number Patient can be reached at:  Home number on file [phone] (home)    Best Time:  any    Can we leave a detailed message on this number?  YES    Call taken on 11/2/2020 at 11:07 AM by Mirta Luque

## 2020-11-02 NOTE — TELEPHONE ENCOUNTER
Routing to PCP to advise     Patient having lab work done towards end of month at [facility].  Has surgery scheduled with oncology 11/24/20.    Pended the following labs  A1C, BMP, Lipid panel, TSH    Any other labs needed?        Arleth Gallardo RN

## 2020-11-23 ENCOUNTER — ANCILLARY PROCEDURE (OUTPATIENT)
Dept: CT IMAGING | Facility: CLINIC | Age: 65
End: 2020-11-23
Attending: INTERNAL MEDICINE
Payer: COMMERCIAL

## 2020-11-23 VITALS
SYSTOLIC BLOOD PRESSURE: 125 MMHG | RESPIRATION RATE: 16 BRPM | HEART RATE: 91 BPM | DIASTOLIC BLOOD PRESSURE: 74 MMHG | OXYGEN SATURATION: 95 % | WEIGHT: 171.9 LBS | BODY MASS INDEX: 25.76 KG/M2 | TEMPERATURE: 97.4 F

## 2020-11-23 DIAGNOSIS — R73.09 ELEVATED GLUCOSE: ICD-10-CM

## 2020-11-23 DIAGNOSIS — D68.61 ANTIPHOSPHOLIPID ANTIBODY SYNDROME (H): ICD-10-CM

## 2020-11-23 DIAGNOSIS — Z13.6 CARDIOVASCULAR SCREENING; LDL GOAL LESS THAN 100: ICD-10-CM

## 2020-11-23 DIAGNOSIS — E03.4 HYPOTHYROIDISM DUE TO ACQUIRED ATROPHY OF THYROID: ICD-10-CM

## 2020-11-23 DIAGNOSIS — Z95.828 PORT-A-CATH IN PLACE: Primary | ICD-10-CM

## 2020-11-23 DIAGNOSIS — K21.9 GASTROESOPHAGEAL REFLUX DISEASE WITHOUT ESOPHAGITIS: ICD-10-CM

## 2020-11-23 DIAGNOSIS — C48.0 LIPOSARCOMA OF RETROPERITONEUM (H): ICD-10-CM

## 2020-11-23 DIAGNOSIS — R79.89 ELEVATED SERUM CREATININE: ICD-10-CM

## 2020-11-23 DIAGNOSIS — E11.9 TYPE 2 DIABETES MELLITUS WITHOUT COMPLICATION, WITHOUT LONG-TERM CURRENT USE OF INSULIN (H): ICD-10-CM

## 2020-11-23 DIAGNOSIS — K21.9 GASTROESOPHAGEAL REFLUX DISEASE: ICD-10-CM

## 2020-11-23 LAB
ANION GAP SERPL CALCULATED.3IONS-SCNC: 8 MMOL/L (ref 3–14)
BUN SERPL-MCNC: 23 MG/DL (ref 7–30)
CALCIUM SERPL-MCNC: 9.3 MG/DL (ref 8.5–10.1)
CHLORIDE SERPL-SCNC: 107 MMOL/L (ref 94–109)
CHOLEST SERPL-MCNC: 161 MG/DL
CO2 SERPL-SCNC: 23 MMOL/L (ref 20–32)
CREAT SERPL-MCNC: 0.89 MG/DL (ref 0.52–1.04)
GFR SERPL CREATININE-BSD FRML MDRD: 68 ML/MIN/{1.73_M2}
GLUCOSE SERPL-MCNC: 125 MG/DL (ref 70–99)
HBA1C MFR BLD: 5.2 % (ref 0–5.6)
HDLC SERPL-MCNC: 82 MG/DL
LDLC SERPL CALC-MCNC: 71 MG/DL
NONHDLC SERPL-MCNC: 80 MG/DL
POTASSIUM SERPL-SCNC: 3.6 MMOL/L (ref 3.4–5.3)
SODIUM SERPL-SCNC: 139 MMOL/L (ref 133–144)
T4 FREE SERPL-MCNC: 1.29 NG/DL (ref 0.76–1.46)
TRIGL SERPL-MCNC: 45 MG/DL
TSH SERPL DL<=0.005 MIU/L-ACNC: 0.14 MU/L (ref 0.4–4)

## 2020-11-23 PROCEDURE — 83036 HEMOGLOBIN GLYCOSYLATED A1C: CPT | Performed by: FAMILY MEDICINE

## 2020-11-23 PROCEDURE — 80048 BASIC METABOLIC PNL TOTAL CA: CPT | Performed by: FAMILY MEDICINE

## 2020-11-23 PROCEDURE — 250N000011 HC RX IP 250 OP 636: Performed by: INTERNAL MEDICINE

## 2020-11-23 PROCEDURE — 84443 ASSAY THYROID STIM HORMONE: CPT | Performed by: FAMILY MEDICINE

## 2020-11-23 PROCEDURE — 36591 DRAW BLOOD OFF VENOUS DEVICE: CPT

## 2020-11-23 PROCEDURE — 80061 LIPID PANEL: CPT | Performed by: FAMILY MEDICINE

## 2020-11-23 PROCEDURE — 84439 ASSAY OF FREE THYROXINE: CPT | Performed by: FAMILY MEDICINE

## 2020-11-23 PROCEDURE — 74177 CT ABD & PELVIS W/CONTRAST: CPT | Mod: GC | Performed by: STUDENT IN AN ORGANIZED HEALTH CARE EDUCATION/TRAINING PROGRAM

## 2020-11-23 PROCEDURE — 71260 CT THORAX DX C+: CPT | Mod: GC | Performed by: STUDENT IN AN ORGANIZED HEALTH CARE EDUCATION/TRAINING PROGRAM

## 2020-11-23 RX ORDER — HEPARIN SODIUM (PORCINE) LOCK FLUSH IV SOLN 100 UNIT/ML 100 UNIT/ML
500 SOLUTION INTRAVENOUS ONCE
Status: COMPLETED | OUTPATIENT
Start: 2020-11-23 | End: 2020-11-23

## 2020-11-23 RX ORDER — IOPAMIDOL 755 MG/ML
105 INJECTION, SOLUTION INTRAVASCULAR ONCE
Status: COMPLETED | OUTPATIENT
Start: 2020-11-23 | End: 2020-11-23

## 2020-11-23 RX ORDER — HEPARIN SODIUM (PORCINE) LOCK FLUSH IV SOLN 100 UNIT/ML 100 UNIT/ML
5 SOLUTION INTRAVENOUS
Status: DISCONTINUED | OUTPATIENT
Start: 2020-11-23 | End: 2021-03-11 | Stop reason: HOSPADM

## 2020-11-23 RX ORDER — HEPARIN SODIUM (PORCINE) LOCK FLUSH IV SOLN 100 UNIT/ML 100 UNIT/ML
5 SOLUTION INTRAVENOUS
Status: COMPLETED | OUTPATIENT
Start: 2020-11-23 | End: 2020-11-23

## 2020-11-23 RX ADMIN — HEPARIN SODIUM (PORCINE) LOCK FLUSH IV SOLN 100 UNIT/ML 500 UNITS: 100 SOLUTION at 10:32

## 2020-11-23 RX ADMIN — Medication 5 ML: at 09:52

## 2020-11-23 RX ADMIN — IOPAMIDOL 105 ML: 755 INJECTION, SOLUTION INTRAVASCULAR at 10:16

## 2020-11-23 ASSESSMENT — PAIN SCALES - GENERAL: PAINLEVEL: NO PAIN (1)

## 2020-11-23 NOTE — LETTER
11/23/2020         RE: Shiela Hewitt  [address] [place] [address]        Dear Colleague,    Thank you for referring your patient, Shiela Hewitt, to the [facility]. Please see a copy of my visit note below.    No notes on file    Again, thank you for allowing me to participate in the care of your patient.        Sincerely,        [facility] Draw

## 2020-11-23 NOTE — NURSING NOTE
"Chief Complaint   Patient presents with     Port Draw     labs drawn from port by rn.  vs taken     Port accessed with 20 gauge 3/4\" Power needle and labs drawn by rn.  Port flushed with NS and heparin.  Pt tolerated well.  VS taken.      Otilia Montano RN      "

## 2020-11-24 ENCOUNTER — VIRTUAL VISIT (OUTPATIENT)
Dept: ONCOLOGY | Facility: CLINIC | Age: 65
End: 2020-11-24
Attending: INTERNAL MEDICINE
Payer: COMMERCIAL

## 2020-11-24 DIAGNOSIS — D68.61 ANTIPHOSPHOLIPID ANTIBODY SYNDROME (H): ICD-10-CM

## 2020-11-24 DIAGNOSIS — R73.09 ELEVATED GLUCOSE: ICD-10-CM

## 2020-11-24 DIAGNOSIS — M54.50 ACUTE RIGHT-SIDED LOW BACK PAIN WITHOUT SCIATICA: ICD-10-CM

## 2020-11-24 DIAGNOSIS — R10.9 FLANK PAIN: ICD-10-CM

## 2020-11-24 DIAGNOSIS — R39.89 PNEUMATURIA: ICD-10-CM

## 2020-11-24 DIAGNOSIS — C49.9 SARCOMA OF SOFT TISSUE (H): ICD-10-CM

## 2020-11-24 DIAGNOSIS — E03.4 HYPOTHYROIDISM DUE TO ACQUIRED ATROPHY OF THYROID: ICD-10-CM

## 2020-11-24 DIAGNOSIS — H81.11 BENIGN PAROXYSMAL POSITIONAL VERTIGO OF RIGHT EAR: ICD-10-CM

## 2020-11-24 DIAGNOSIS — Z91.041 RADIOGRAPHIC DYE ALLERGY STATUS: ICD-10-CM

## 2020-11-24 DIAGNOSIS — R79.89 ELEVATED SERUM CREATININE: ICD-10-CM

## 2020-11-24 DIAGNOSIS — C49.9 SARCOMA OF SOFT TISSUE (H): Primary | ICD-10-CM

## 2020-11-24 LAB
ALBUMIN UR-MCNC: 30 MG/DL
APPEARANCE UR: CLEAR
BACTERIA #/AREA URNS HPF: ABNORMAL /HPF
BILIRUB UR QL STRIP: ABNORMAL
CAOX CRY #/AREA URNS HPF: ABNORMAL /HPF
COLOR UR AUTO: YELLOW
GLUCOSE UR STRIP-MCNC: NEGATIVE MG/DL
HGB UR QL STRIP: NEGATIVE
KETONES UR STRIP-MCNC: ABNORMAL MG/DL
LEUKOCYTE ESTERASE UR QL STRIP: NEGATIVE
NITRATE UR QL: NEGATIVE
PH UR STRIP: 6 PH (ref 5–7)
RBC #/AREA URNS AUTO: ABNORMAL /HPF
SOURCE: ABNORMAL
SP GR UR STRIP: >1.03 (ref 1–1.03)
UROBILINOGEN UR STRIP-ACNC: 0.2 EU/DL (ref 0.2–1)
WBC #/AREA URNS AUTO: ABNORMAL /HPF

## 2020-11-24 PROCEDURE — 99214 OFFICE O/P EST MOD 30 MIN: CPT | Mod: 95 | Performed by: INTERNAL MEDICINE

## 2020-11-24 PROCEDURE — 999N001193 HC VIDEO/TELEPHONE VISIT; NO CHARGE

## 2020-11-24 PROCEDURE — 81001 URINALYSIS AUTO W/SCOPE: CPT | Performed by: INTERNAL MEDICINE

## 2020-11-24 PROCEDURE — 87086 URINE CULTURE/COLONY COUNT: CPT | Performed by: INTERNAL MEDICINE

## 2020-11-24 NOTE — LETTER
11/24/2020         RE: Shiela Hewitt  [address] [place] [address]        Dear Colleague,    Thank you for referring your patient, Shiela Hewitt, to the [facility]. Please see a copy of my visit note below.       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   -  Background  In brief, she noted an abdominal mass at the end of 2013, which was quite large and was resected on 01/28/2014. This tumor was about 26 cm in greatest diameter, high-grade, with necrosis present. In retrospect, she had had a CT scan done about 2 years before that, which was felt to be negative. In 08/2014, repeat imaging revealed a 3.5 cm mass near the psoas, and she received preoperative radiation therapy for this. 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 A new margin was obtained, but this was still positive for liposarcoma. All visible tumor was removed.      A recurrence was noted in the abd and she began lipodox/ifos about 3-19-15 and had 6 cycles; the last in August 2015.  -  She was admitted for partial SBO earlier in 2018.  -        Interval history         Due to covid we are doing a video visit        Sandro is doing pretty very well now though she has had some R low back pain that comes in waves first a week ago.  No F/C/S.  Bothers mostly at night.  No dysuria.     She had an episode of hematuria in [place] and a UC was negative but got 7 days of cipro and it resolved.    She does have chronic diarrhea that depends on what she eats she thinks.She has a history of gi issues; no imodium now.Typically she has about 6 (sometimes 10) loose stools/d.     She had an episode of SBO in [place]  In Feb , Aug 26, and 11-13 this year.     She had some colitis episodes in the past.     She has occ GERD;had some bad episodes in [place] when off omeprazole.  She was off omeprazole but finds she  needs it.    She has not been that active but does walk the dog.     Her toes were bothering her and she was given gabapentin that helped the toes and also hot flashes but had thinking issues so went to 300 mg every day; she has not increased it since it seems to make her forgetful.     Vertigo is OK now.  In June 2018 she had several episodes of vertigo and developed noise in her R ear requiring hospitalization.  She is not having vertigo now but has R hearing loss.     She usually stays in [place] from January through May. She plans to drive back to [place] in late Dec.     Right thigh is also numb since the second surgery.    She has some weakness in the right leg , stable, and if that does not affect her life.  She can still walk a mile at least. She goes up 2 floors without stopping.     She still has the port and flushes but that was delayed due to covid.    She has a longstanding history of hypothyroidism on replacement T4, and some seasonal allergies. She was also noted to have an antiphospholipid antibody. This was checked because her sister presented with a mesenteric artery clot.     Sleep not perfect w hot flashes.     Her 14-point ROS is otherwise unremarkable         -  Background PMH, FH, SH  PAST MEDICAL HISTORY: She did have a tonsillectomy at age 15 and some sinus surgery in about 1994 along with a tubal pregnancy and a tubal ligation.   ALLERGIES: She does describe 1 hive after IV contrast material about 20 years ago, so she has been taking prednisone and Benadryl for contrast scans. She does have some itching with Dilaudid, but still uses it.   SOCIAL HISTORY: She is a never smoker and does not abuse alcohol or other drugs. 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  -          On exam she appeared comfortable with normal affect.   HEENT full EOMs  MSK good neck movement  CHEST no resp distress  NEURO  normal mentation and speech   PSYCH Mood  "good.     -  11-23 LFT, GNE OK, w creat 0.89.  TSH 0.14, free T4 1.29  Glu 125 - was FBS     -   I reviewed her new CT images today and report and there is no clear PD and that is the formal read; she does have some small lung nodules stable since 2016.     Formal read:  1. There is no evidence of recurrence or metastatic disease in the  chest, abdomen or pelvis.  2. Multiple pulmonary nodules are stable compared to at least 2016.  3. Small focus of air within the bladder without wall thickening may  be seen with recent catheterization or infection. Recommend clinical  Correlation.    -  A/P   We discussed a number of issues.      No recurrence; we will restage about 5-21    Contrast allergy requires methylpred; She has steroid for the next scan and takes that.     Port issue-has poor veins so she will try to keep it a bit longer and flush regularly.    Creat is a bit higher (though she says same as in Feb) and glu high.  She will see PCP about these 2 issues shortly.  She does have elevated FBS and will follo w her PCP    New back pain w CT findings could reflect pyelo/uti - check UA/UC today and possible antibiotics.    We discussed other gabapentin dosing is available to consider.     She will try to wean off the prilosec was possible.     She is > 5 years post Chemotherapy (August 2015) and is doing well re sarcoma.       We will plan to restage when she returns from [place] in the Spring around 5-21.      All questions were addressed and she will call if other questions arise.      Rob Wiseman M.D.  Professor  Hematology, Oncology and Transplantation          Shiela Hewitt is a 65 year old female who is being evaluated via a billable video visit.      The patient has been notified of following:     \"This video visit will be conducted via a call between you and your physician/provider. We have found that certain health care needs can be provided without the need for an in-person physical exam.  This service " "lets us provide the care you need with a video conversation.  If a prescription is necessary we can send it directly to your pharmacy.  If lab work is needed we can place an order for that and you can then stop by our lab to have the test done at a later time.    Video visits are billed at different rates depending on your insurance coverage.  Please reach out to your insurance provider with any questions.    If during the course of the call the physician/provider feels a video visit is not appropriate, you will not be charged for this service.\"    Patient has given verbal consent for Video visit? Yes  How would you like to obtain your AVS? MyChart  If you are dropped from the video visit, the video invite should be resent to: Text to cell phone: [phone]  Will anyone else be joining your video visit? No        ABRAM Tyler      Video-Visit Details    Type of service:  Video Visit    Video Start Time:1-01  Video End Time: 1025    Originating Location (pt. Location): home    Distant Location (provider location):  [facility] CANCER [facility]     Platform used for Video Visit:ruy            Again, thank you for allowing me to participate in the care of your patient.        Sincerely,        Rob Wiseman MD    "

## 2020-11-24 NOTE — PROGRESS NOTES
"Shiela Hewitt is a 65 year old female who is being evaluated via a billable video visit.      The patient has been notified of following:     \"This video visit will be conducted via a call between you and your physician/provider. We have found that certain health care needs can be provided without the need for an in-person physical exam.  This service lets us provide the care you need with a video conversation.  If a prescription is necessary we can send it directly to your pharmacy.  If lab work is needed we can place an order for that and you can then stop by our lab to have the test done at a later time.    Video visits are billed at different rates depending on your insurance coverage.  Please reach out to your insurance provider with any questions.    If during the course of the call the physician/provider feels a video visit is not appropriate, you will not be charged for this service.\"    Patient has given verbal consent for Video visit? Yes  How would you like to obtain your AVS? MyChart  If you are dropped from the video visit, the video invite should be resent to: Text to cell phone: [phone]  Will anyone else be joining your video visit? No        ABRAM Tyler      Video-Visit Details    Type of service:  Video Visit    Video Start Time:1-01  Video End Time: 1025    Originating Location (pt. Location): home    Distant Location (provider location):  [facility] CANCER [facility]     Platform used for Video Visit:ruy        "

## 2020-11-24 NOTE — PROGRESS NOTES
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   -  Background  In brief, she noted an abdominal mass at the end of 2013, which was quite large and was resected on 01/28/2014. This tumor was about 26 cm in greatest diameter, high-grade, with necrosis present. In retrospect, she had had a CT scan done about 2 years before that, which was felt to be negative. In 08/2014, repeat imaging revealed a 3.5 cm mass near the psoas, and she received preoperative radiation therapy for this. 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 A new margin was obtained, but this was still positive for liposarcoma. All visible tumor was removed.      A recurrence was noted in the abd and she began lipodox/ifos about 3-19-15 and had 6 cycles; the last in August 2015.  -  She was admitted for partial SBO earlier in 2018.  -        Interval history         Due to covid we are doing a video visit        Sandro is doing pretty very well now though she has had some R low back pain that comes in waves first a week ago.  No F/C/S.  Bothers mostly at night.  No dysuria.     She had an episode of hematuria in [place] and a UC was negative but got 7 days of cipro and it resolved.    She does have chronic diarrhea that depends on what she eats she thinks.She has a history of gi issues; no imodium now.Typically she has about 6 (sometimes 10) loose stools/d.     She had an episode of SBO in [place]  In Feb , Aug 26, and 11-13 this year.     She had some colitis episodes in the past.     She has occ GERD;had some bad episodes in [place] when off omeprazole.  She was off omeprazole but finds she needs it.    She has not been that active but does walk the dog.     Her toes were bothering her and she was given gabapentin that helped the toes and also hot flashes but had thinking issues so went to 300 mg every day; she has not increased it since it seems  to make her forgetful.     Vertigo is OK now.  In June 2018 she had several episodes of vertigo and developed noise in her R ear requiring hospitalization.  She is not having vertigo now but has R hearing loss.     She usually stays in [place] from January through May. She plans to drive back to [place] in late Dec.     Right thigh is also numb since the second surgery.    She has some weakness in the right leg , stable, and if that does not affect her life.  She can still walk a mile at least. She goes up 2 floors without stopping.     She still has the port and flushes but that was delayed due to covid.    She has a longstanding history of hypothyroidism on replacement T4, and some seasonal allergies. She was also noted to have an antiphospholipid antibody. This was checked because her sister presented with a mesenteric artery clot.     Sleep not perfect w hot flashes.     Her 14-point ROS is otherwise unremarkable         -  Background PMH, FH, SH  PAST MEDICAL HISTORY: She did have a tonsillectomy at age 15 and some sinus surgery in about 1994 along with a tubal pregnancy and a tubal ligation.   ALLERGIES: She does describe 1 hive after IV contrast material about 20 years ago, so she has been taking prednisone and Benadryl for contrast scans. She does have some itching with Dilaudid, but still uses it.   SOCIAL HISTORY: She is a never smoker and does not abuse alcohol or other drugs. 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  -          On exam she appeared comfortable with normal affect.   HEENT full EOMs  MSK good neck movement  CHEST no resp distress  NEURO  normal mentation and speech   PSYCH Mood good.     -  11-23 LFT, GNE OK, w creat 0.89.  TSH 0.14, free T4 1.29  Glu 125 - was FBS     -   I reviewed her new CT images today and report and there is no clear PD and that is the formal read; she does have some small lung nodules stable since 2016.     Formal  read:  1. There is no evidence of recurrence or metastatic disease in the  chest, abdomen or pelvis.  2. Multiple pulmonary nodules are stable compared to at least 2016.  3. Small focus of air within the bladder without wall thickening may  be seen with recent catheterization or infection. Recommend clinical  Correlation.    -  A/P   We discussed a number of issues.      No recurrence; we will restage about 5-21    Contrast allergy requires methylpred; She has steroid for the next scan and takes that.     Port issue-has poor veins so she will try to keep it a bit longer and flush regularly.    Creat is a bit higher (though she says same as in Feb) and glu high.  She will see PCP about these 2 issues shortly.  She does have elevated FBS and will follo w her PCP    New back pain w CT findings could reflect pyelo/uti - check UA/UC today and possible antibiotics.    We discussed other gabapentin dosing is available to consider.     She will try to wean off the prilosec was possible.     She is > 5 years post Chemotherapy (August 2015) and is doing well re sarcoma.       We will plan to restage when she returns from [place] in the Spring around 5-21.      All questions were addressed and she will call if other questions arise.      Rob Wiseman M.D.  Professor  Hematology, Oncology and Transplantation

## 2020-11-25 LAB
BACTERIA SPEC CULT: NORMAL
Lab: NORMAL
SPECIMEN SOURCE: NORMAL

## 2020-11-30 ENCOUNTER — VIRTUAL VISIT (OUTPATIENT)
Dept: FAMILY MEDICINE | Facility: CLINIC | Age: 65
End: 2020-11-30
Payer: COMMERCIAL

## 2020-11-30 DIAGNOSIS — N95.1 MENOPAUSAL SYNDROME (HOT FLASHES): ICD-10-CM

## 2020-11-30 DIAGNOSIS — E03.9 HYPOTHYROIDISM, UNSPECIFIED TYPE: Primary | ICD-10-CM

## 2020-11-30 DIAGNOSIS — K21.00 GASTROESOPHAGEAL REFLUX DISEASE WITH ESOPHAGITIS, UNSPECIFIED WHETHER HEMORRHAGE: ICD-10-CM

## 2020-11-30 DIAGNOSIS — E03.9 HYPOTHYROIDISM, UNSPECIFIED TYPE: ICD-10-CM

## 2020-11-30 PROCEDURE — 99214 OFFICE O/P EST MOD 30 MIN: CPT | Mod: 95 | Performed by: FAMILY MEDICINE

## 2020-11-30 RX ORDER — LEVOTHYROXINE SODIUM 88 UG/1
TABLET ORAL
Qty: 90 TABLET | Refills: 3 | Status: CANCELLED | OUTPATIENT
Start: 2020-11-30

## 2020-11-30 RX ORDER — LEVOTHYROXINE SODIUM 75 UG/1
75 TABLET ORAL DAILY
Qty: 60 TABLET | Refills: 1 | Status: SHIPPED | OUTPATIENT
Start: 2020-11-30 | End: 2020-12-01

## 2020-11-30 RX ORDER — FLUOXETINE 10 MG/1
10 TABLET, FILM COATED ORAL DAILY
Qty: 30 TABLET | Refills: 3 | Status: SHIPPED | OUTPATIENT
Start: 2020-11-30 | End: 2021-05-21

## 2020-11-30 RX ORDER — OMEPRAZOLE 40 MG/1
40 CAPSULE, DELAYED RELEASE ORAL DAILY
Qty: 90 CAPSULE | Refills: 3 | Status: SHIPPED | OUTPATIENT
Start: 2020-11-30 | End: 2022-08-01

## 2020-11-30 NOTE — PROGRESS NOTES
"Shiela Hewitt is a 65 year old female who is being evaluated via a billable telephone visit.      The patient has been notified of following:     \"This telephone visit will be conducted via a call between you and your physician/provider. We have found that certain health care needs can be provided without the need for a physical exam.  This service lets us provide the care you need with a short phone conversation.  If a prescription is necessary we can send it directly to your pharmacy.  If lab work is needed we can place an order for that and you can then stop by our lab to have the test done at a later time.    Telephone visits are billed at different rates depending on your insurance coverage. During this emergency period, for some insurers they may be billed the same as an in-person visit.  Please reach out to your insurance provider with any questions.    If during the course of the call the physician/provider feels a telephone visit is not appropriate, you will not be charged for this service.\"    Patient has given verbal consent for Telephone visit?  Yes    What phone number would you like to be contacted at? [phone]    How would you like to obtain your AVS? MyChart    Subjective     Shiela Hewitt is a 65 year old female who presents via phone visit today for the following health issues:    HPI    Patient is following up for lab work that was done    Patient had Dexa scan 10 years ago and is wondering if she needs another one     Patient had vertigo which lasted a few hours.  She did vomit from this.  It had been a year and prior episodes would last for 12 hours.      Patient gets small bowel obstructions had one on 2/1/2020 8/26/2020 November 2020    Had overall dermatology visit in July 2020    Medication Followup of Gabapentin 300 mg hs    Taking Medication as prescribed: NO-only taking once per day for neuropathy     Side Effects:  Dizzy    Medication Helping Symptoms:  Is helping for neuropathy, " "but is wondering about something else for hot flashes      She doesn't tolerate gabapentin tid.      Hypothyroidism Follow-up      Since last visit, patient describes the following symptoms: loose stools and anxiety  TSH   Date Value Ref Range Status   11/23/2020 0.14 (L) 0.40 - 4.00 mU/L Final       Levothyroxine 88 mcg daily        Review of Systems   Constitutional, HEENT, cardiovascular, pulmonary, GI, , musculoskeletal, neuro, skin, endocrine and psych systems are negative, except as otherwise noted.       Objective          Vitals:  No vitals were obtained today due to virtual visit.    healthy, alert and no distress  PSYCH: Alert and oriented times 3; coherent speech, normal   rate and volume, able to articulate logical thoughts, able   to abstract reason, no tangential thoughts, no hallucinations   or delusions  Her affect is normal  RESP: No cough, no audible wheezing, able to talk in full sentences  Remainder of exam unable to be completed due to telephone visits    No results found for this or any previous visit (from the past 24 hour(s)).        Assessment/Plan:    Assessment & Plan     Shiela was seen today for results.    Diagnoses and all orders for this visit:    Hypothyroidism, unspecified type  -     levothyroxine (SYNTHROID/LEVOTHROID) 75 MCG tablet; Take 1 tablet (75 mcg) by mouth daily    Gastroesophageal reflux disease with esophagitis, unspecified whether hemorrhage  -     omeprazole (PRILOSEC) 40 MG DR capsule; Take 1 capsule (40 mg) by mouth daily    Menopausal syndrome (hot flashes)  -     FLUoxetine (PROZAC) 10 MG tablet; Take 1 tablet (10 mg) by mouth daily    I have reduced this patient's levothyroxine dose from 88mcg to 75 mcg daily.  I will have her add Prozac 10 mg daily to help with her hot flashes     BMI:   Estimated body mass index is 25.76 kg/m  as calculated from the following:    Height as of 12/5/19: 1.74 m (5' 8.5\").    Weight as of 11/23/20: 78 kg (171 lb 14.4 oz).        "     Return in about 6 months (around 5/30/2021) for Routine Visit.    DO MELONY Mcclure [facility]    Phone call duration:  14 minutes

## 2020-12-01 ENCOUNTER — PATIENT OUTREACH (OUTPATIENT)
Dept: ONCOLOGY | Facility: CLINIC | Age: 65
End: 2020-12-01

## 2020-12-01 RX ORDER — LEVOTHYROXINE SODIUM 75 UG/1
TABLET ORAL
Qty: 90 TABLET | Refills: 0 | Status: SHIPPED | OUTPATIENT
Start: 2020-12-01 | End: 2021-05-13

## 2020-12-01 NOTE — PROGRESS NOTES
Spoke with Shiela today who states that she is still having lower flank ache but not pain per se.  This comes and goes intermittently and is like a wave.  She is not having any fevers, pain, or burning on urination.  She is wondering if  wants her to start any antibiotics.  He has been notified.  Let her know I will call her when he replies.    Update:  Per - she should connect with her PCP to see what they think.  No use in giving antibiotics now.  Called Shiela and let her know of the recommendations and she agreed with and verbalized understanding of the plan of care.

## 2020-12-23 PROCEDURE — 250N000011 HC RX IP 250 OP 636: Performed by: INTERNAL MEDICINE

## 2020-12-23 PROCEDURE — 96523 IRRIG DRUG DELIVERY DEVICE: CPT

## 2020-12-23 RX ORDER — HEPARIN SODIUM (PORCINE) LOCK FLUSH IV SOLN 100 UNIT/ML 100 UNIT/ML
5 SOLUTION INTRAVENOUS ONCE
Status: COMPLETED | OUTPATIENT
Start: 2020-12-23 | End: 2020-12-23

## 2020-12-23 RX ADMIN — SODIUM CHLORIDE, PRESERVATIVE FREE 5 ML: 5 INJECTION INTRAVENOUS at 10:47

## 2020-12-23 NOTE — NURSING NOTE
"Chief Complaint   Patient presents with     Port Flush     port accessed and flushed by rn in lab.      Port accessed with 20g 3/4\" gripper needle by RN in lab.  Port flushed with saline and heparin.  Port de-accessed.    Shae Farris RN      "

## 2021-01-15 ENCOUNTER — HEALTH MAINTENANCE LETTER (OUTPATIENT)
Age: 66
End: 2021-01-15

## 2021-03-04 ENCOUNTER — MYC MEDICAL ADVICE (OUTPATIENT)
Dept: FAMILY MEDICINE | Facility: CLINIC | Age: 66
End: 2021-03-04

## 2021-03-04 NOTE — LETTER
March 11, 2021      Shiela Hewitt  [address] [place] [address]      Your healthcare team cares about your health. To provide you with the best care, we have reviewed your chart and based on our findings, we see that you are due to:     - CERVICAL CANCER SCREENING: Schedule a Cervical Cancer Screening, with Pap and wellness exam.   - OTHER FOLLOW UP:  Annual Physical    If you have already completed these items, please contact the clinic via phone or Mychart so your care team can review and update your records.  Thank you for choosing [facility] for your healthcare needs. For any questions, concerns, or to schedule an appointment please contact the clinic.       Healthy Regards,      Your [facility] Care Team

## 2021-03-04 NOTE — TELEPHONE ENCOUNTER
Patient Quality Outreach      Summary:    Patient has the following on her problem list/HM:     Diabetes    Last A1C:  Lab Results   Component Value Date    A1C 5.2 11/23/2020    A1C 5.4 06/12/2020       Last LDL:    Lab Results   Component Value Date    LDL 71 11/23/2020       Is the patient on a Statin? No          Is the patient on Aspirin? No        Last three blood pressure readings:  BP Readings from Last 3 Encounters:   11/23/20 125/74   12/05/19 107/71   11/26/19 106/68            Tobacco Use      Smoking status: Never Smoker      Smokeless tobacco: Never Used          Patient is due/failing the following:   A1C, Eye Exam, Microalbumin, Statin and Diabetic Follow-Up Visit, Cervical Cancer Screening - PAP Needed and Annual wellness, date due: Overdue    Type of outreach:    Sent [employer] message.    Questions for provider review:    Is this patient Diabetic? No diagnosis on problem list and she failed on statin ?                                                                                         Belinda Bello MA       Chart routed to Provider and care team.

## 2021-03-09 NOTE — TELEPHONE ENCOUNTER
This patient is not diabetic.  She had some borderline elevated glucoses while she was seeing her oncologist.    Mindy Vieira DO

## 2021-03-11 NOTE — TELEPHONE ENCOUNTER
Patient Quality Outreach 2nd Attempt      Summary:    Type of outreach:    Sent letter.    Next Steps:  Reach out within 90 days via [employer].    Max number of attempts reached: Yes. Will try again in 90 days if patient still on fail list.    Questions for provider review:    None                                                                                                                      Belinda Bello MA       Chart routed to Care Team.

## 2021-03-21 ENCOUNTER — HEALTH MAINTENANCE LETTER (OUTPATIENT)
Age: 66
End: 2021-03-21

## 2021-04-16 ENCOUNTER — TELEPHONE (OUTPATIENT)
Dept: FAMILY MEDICINE | Facility: CLINIC | Age: 66
End: 2021-04-16

## 2021-04-16 DIAGNOSIS — E03.4 HYPOTHYROIDISM DUE TO ACQUIRED ATROPHY OF THYROID: Primary | ICD-10-CM

## 2021-04-16 NOTE — TELEPHONE ENCOUNTER
Reason for Call: Request for an order or referral:    Order or referral being requested: lab work - Thyroid    Date needed: at your convenience    Has the patient been seen by the PCP for this problem? YES    Additional comments: patient has a physical on 6/1/2021 and since she has a port she would like you to order any necessary lab work prior to 5/21/2021 when she has her other labs drawn.    Phone number Patient can be reached at:  Home number on file [phone] (home)    Best Time:  any    Can we leave a detailed message on this number?  YES    Call taken on 4/16/2021 at 10:38 AM by Nikhil Roque

## 2021-04-16 NOTE — TELEPHONE ENCOUNTER
Please let the patient know I have ordered a thyroid test for her.  She had lipid test 11/20/2020 which was very good so this does not need to be repeated.    Mindy Vieira DO

## 2021-05-13 DIAGNOSIS — E03.9 HYPOTHYROIDISM, UNSPECIFIED TYPE: ICD-10-CM

## 2021-05-13 RX ORDER — LEVOTHYROXINE SODIUM 75 UG/1
75 TABLET ORAL DAILY
Qty: 15 TABLET | Refills: 0 | Status: SHIPPED | OUTPATIENT
Start: 2021-05-13 | End: 2021-05-21

## 2021-05-13 NOTE — TELEPHONE ENCOUNTER
Routing refill request to provider for review/approval because:  Labs not current:  TSH    TSH   Date Value Ref Range Status   11/23/2020 0.14 (L) 0.40 - 4.00 mU/L Final       Pending Prescriptions:                       Disp   Refills    levothyroxine (SYNTHROID/LEVOTHROID) 75 MC*90 tab*0        Sig: TAKE 1 TABLET(75 MCG) BY MOUTH DAILY        Arleth Gallardo RN

## 2021-05-19 DIAGNOSIS — Z91.041 RADIOGRAPHIC DYE ALLERGY STATUS: ICD-10-CM

## 2021-05-19 RX ORDER — METHYLPREDNISOLONE 32 MG/1
TABLET ORAL
Qty: 2 TABLET | Refills: 0 | Status: SHIPPED | OUTPATIENT
Start: 2021-05-19 | End: 2021-06-01

## 2021-05-19 NOTE — TELEPHONE ENCOUNTER
Pt called for prednisone 32 mg to take prior to CT scan 5/21, stated she wanted to  today to take tomorrow 5/20. Rx sent to [facility].

## 2021-05-21 ENCOUNTER — ANCILLARY PROCEDURE (OUTPATIENT)
Dept: CT IMAGING | Facility: CLINIC | Age: 66
End: 2021-05-21
Attending: INTERNAL MEDICINE
Payer: COMMERCIAL

## 2021-05-21 ENCOUNTER — TELEPHONE (OUTPATIENT)
Dept: FAMILY MEDICINE | Facility: CLINIC | Age: 66
End: 2021-05-21

## 2021-05-21 DIAGNOSIS — Z91.041 RADIOGRAPHIC DYE ALLERGY STATUS: ICD-10-CM

## 2021-05-21 DIAGNOSIS — C49.9 SARCOMA OF SOFT TISSUE (H): ICD-10-CM

## 2021-05-21 DIAGNOSIS — Z95.828 PORT-A-CATH IN PLACE: Primary | ICD-10-CM

## 2021-05-21 DIAGNOSIS — R39.89 PNEUMATURIA: ICD-10-CM

## 2021-05-21 DIAGNOSIS — D68.61 ANTIPHOSPHOLIPID ANTIBODY SYNDROME (H): ICD-10-CM

## 2021-05-21 DIAGNOSIS — H81.11 BENIGN PAROXYSMAL POSITIONAL VERTIGO OF RIGHT EAR: ICD-10-CM

## 2021-05-21 DIAGNOSIS — E03.4 HYPOTHYROIDISM DUE TO ACQUIRED ATROPHY OF THYROID: ICD-10-CM

## 2021-05-21 DIAGNOSIS — R73.09 ELEVATED GLUCOSE: ICD-10-CM

## 2021-05-21 DIAGNOSIS — R10.9 FLANK PAIN: ICD-10-CM

## 2021-05-21 DIAGNOSIS — R79.89 ELEVATED SERUM CREATININE: ICD-10-CM

## 2021-05-21 DIAGNOSIS — M54.50 ACUTE RIGHT-SIDED LOW BACK PAIN WITHOUT SCIATICA: ICD-10-CM

## 2021-05-21 DIAGNOSIS — E03.9 HYPOTHYROIDISM, UNSPECIFIED TYPE: ICD-10-CM

## 2021-05-21 LAB
ALBUMIN SERPL-MCNC: 4 G/DL (ref 3.4–5)
ALP SERPL-CCNC: 74 U/L (ref 40–150)
ALT SERPL W P-5'-P-CCNC: 24 U/L (ref 0–50)
ANION GAP SERPL CALCULATED.3IONS-SCNC: 10 MMOL/L (ref 3–14)
AST SERPL W P-5'-P-CCNC: 15 U/L (ref 0–45)
BASOPHILS # BLD AUTO: 0 10E9/L (ref 0–0.2)
BASOPHILS NFR BLD AUTO: 0.1 %
BILIRUB SERPL-MCNC: 0.6 MG/DL (ref 0.2–1.3)
BUN SERPL-MCNC: 20 MG/DL (ref 7–30)
CALCIUM SERPL-MCNC: 9.2 MG/DL (ref 8.5–10.1)
CHLORIDE SERPL-SCNC: 109 MMOL/L (ref 94–109)
CO2 SERPL-SCNC: 24 MMOL/L (ref 20–32)
CREAT SERPL-MCNC: 0.99 MG/DL (ref 0.52–1.04)
DIFFERENTIAL METHOD BLD: ABNORMAL
EOSINOPHIL # BLD AUTO: 0 10E9/L (ref 0–0.7)
EOSINOPHIL NFR BLD AUTO: 0 %
ERYTHROCYTE [DISTWIDTH] IN BLOOD BY AUTOMATED COUNT: 13.2 % (ref 10–15)
GFR SERPL CREATININE-BSD FRML MDRD: 60 ML/MIN/{1.73_M2}
GLUCOSE SERPL-MCNC: 110 MG/DL (ref 70–99)
HCT VFR BLD AUTO: 38.3 % (ref 35–47)
HGB BLD-MCNC: 12.9 G/DL (ref 11.7–15.7)
IMM GRANULOCYTES # BLD: 0 10E9/L (ref 0–0.4)
IMM GRANULOCYTES NFR BLD: 0.1 %
LYMPHOCYTES # BLD AUTO: 0.7 10E9/L (ref 0.8–5.3)
LYMPHOCYTES NFR BLD AUTO: 10.1 %
MCH RBC QN AUTO: 33.2 PG (ref 26.5–33)
MCHC RBC AUTO-ENTMCNC: 33.7 G/DL (ref 31.5–36.5)
MCV RBC AUTO: 99 FL (ref 78–100)
MONOCYTES # BLD AUTO: 0.2 10E9/L (ref 0–1.3)
MONOCYTES NFR BLD AUTO: 2.1 %
NEUTROPHILS # BLD AUTO: 6.3 10E9/L (ref 1.6–8.3)
NEUTROPHILS NFR BLD AUTO: 87.6 %
NRBC # BLD AUTO: 0 10*3/UL
NRBC BLD AUTO-RTO: 0 /100
PLATELET # BLD AUTO: 198 10E9/L (ref 150–450)
POTASSIUM SERPL-SCNC: 3.2 MMOL/L (ref 3.4–5.3)
PROT SERPL-MCNC: 7.2 G/DL (ref 6.8–8.8)
RBC # BLD AUTO: 3.88 10E12/L (ref 3.8–5.2)
SODIUM SERPL-SCNC: 142 MMOL/L (ref 133–144)
TSH SERPL DL<=0.005 MIU/L-ACNC: 0.92 MU/L (ref 0.4–4)
WBC # BLD AUTO: 7.2 10E9/L (ref 4–11)

## 2021-05-21 PROCEDURE — 36591 DRAW BLOOD OFF VENOUS DEVICE: CPT

## 2021-05-21 PROCEDURE — 71260 CT THORAX DX C+: CPT | Performed by: RADIOLOGY

## 2021-05-21 PROCEDURE — 85025 COMPLETE CBC W/AUTO DIFF WBC: CPT | Performed by: INTERNAL MEDICINE

## 2021-05-21 PROCEDURE — 74177 CT ABD & PELVIS W/CONTRAST: CPT | Performed by: RADIOLOGY

## 2021-05-21 PROCEDURE — 84443 ASSAY THYROID STIM HORMONE: CPT | Performed by: INTERNAL MEDICINE

## 2021-05-21 PROCEDURE — 80053 COMPREHEN METABOLIC PANEL: CPT | Performed by: INTERNAL MEDICINE

## 2021-05-21 PROCEDURE — 250N000011 HC RX IP 250 OP 636: Performed by: INTERNAL MEDICINE

## 2021-05-21 RX ORDER — LEVOTHYROXINE SODIUM 75 UG/1
75 TABLET ORAL DAILY
Qty: 90 TABLET | Refills: 3 | Status: SHIPPED | OUTPATIENT
Start: 2021-05-21 | End: 2022-05-24

## 2021-05-21 RX ORDER — HEPARIN SODIUM (PORCINE) LOCK FLUSH IV SOLN 100 UNIT/ML 100 UNIT/ML
500 SOLUTION INTRAVENOUS ONCE
Status: COMPLETED | OUTPATIENT
Start: 2021-05-21 | End: 2021-05-21

## 2021-05-21 RX ORDER — IOPAMIDOL 755 MG/ML
105 INJECTION, SOLUTION INTRAVASCULAR ONCE
Status: COMPLETED | OUTPATIENT
Start: 2021-05-21 | End: 2021-05-21

## 2021-05-21 RX ORDER — HEPARIN SODIUM (PORCINE) LOCK FLUSH IV SOLN 100 UNIT/ML 100 UNIT/ML
5 SOLUTION INTRAVENOUS ONCE
Status: COMPLETED | OUTPATIENT
Start: 2021-05-21 | End: 2021-05-21

## 2021-05-21 RX ADMIN — Medication 5 ML: at 09:49

## 2021-05-21 RX ADMIN — HEPARIN SODIUM (PORCINE) LOCK FLUSH IV SOLN 100 UNIT/ML 500 UNITS: 100 SOLUTION at 10:36

## 2021-05-21 RX ADMIN — IOPAMIDOL 105 ML: 755 INJECTION, SOLUTION INTRAVASCULAR at 10:27

## 2021-05-21 NOTE — NURSING NOTE
Chief Complaint   Patient presents with     Port Draw     Labs drawn via port by rn in lab. VS taken.     Port accessed with 20g 3/4 inch power needle by RN, labs collected, line flushed with saline and heparin.      Lang Gagnon, RN

## 2021-05-21 NOTE — TELEPHONE ENCOUNTER
aMgno Kuo,    Thank you for getting your labs rechecked.  Your labs were normal.   I have called in a year supply of the current thyroid medication dose.  Feel free to email or call if you have any questions.    Have a nice day.    Mindy Vieira D.O.

## 2021-05-24 NOTE — PROGRESS NOTES
"Shiela is a 65 year old who is being evaluated via a billable video visit.      How would you like to obtain your AVS? MyChart  If the video visit is dropped, the invitation should be resent by: Text to cell phone: [phone]  Will anyone else be joining your video visit? Yes: - Андрей. How would they like to receive their invitation? Text to cell phone: [phone]   Vitals - Patient Reported  Weight (Patient Reported): 74.8 kg (165 lb)  Height (Patient Reported): 174 cm (5' 8.5\")  BMI (Based on Pt Reported Ht/Wt): 24.72  Pain Score: No Pain (0)    Kathy Gutierrez CMA on 2021 at 10:26 AM        Video Start Time:1042  Video-Visit Details    Type of service:  Video Visit    Video End [id number]    Originating Location (pt. Location):home    Distant Location (provider location):  [facility]     [facility]         21      I saw Shiela Hewitt for f/u of a recurrent abdominal dedifferentiated liposarcoma.   -  Background  In brief, she noted an abdominal mass at the end of , which was quite large and was resected on 2014. This tumor was about 26 cm in greatest diameter, high-grade, with necrosis present. In retrospect, she had had a CT scan done about 2 years before that, which was felt to be negative. In 2014, repeat imaging revealed a 3.5 cm mass near the psoas, and she received preoperative radiation therapy for this. This was resected on 2014, again revealing high-grade dedifferentiated liposarcoma with positive margins. A new margin was obtained, but this was still positive for liposarcoma. All visible tumor was removed.      A recurrence was noted in the abd and she began lipodox/ifos about 3-19-15 and had 6 cycles; the last in 2015.  -  She was admitted for partial SBO earlier in 2018.  -        Interval history        Sandro is doing pretty very well now though her sister and mom  in the last month and shes the " executor or her mom.  She was very active walking an hour daily in [place] and doing exercise and golfing.     She did note some MEDRANO going up the clinic 2 floors recently.  She has a PCP appt soon and plans to get a heart check then since her sister  of an MI.  She has some weakness in the right leg , stable, and if that does not affect her life.    She does have chronic diarrhea that depends on what she eats; still an issue. She has a history of gi issues; no imodium now as that can cause diarrhea; shes concerned about getting another SBO.  she did have another SBO the past winter for 6-7 hours.  She had some colitis episodes in the past.     She has occ GERD; on omeprazole.  She was off omeprazole but finds she needs it.    Her toes were bothering her and she is on gabapentin that helps the toes and also hot flashes but had thinking issues above 300 mg every day; this is a burning in the feet.    Vertigo is OK now.  In 2018 she had several episodes of vertigo and developed noise in her R ear requiring hospitalization.  She is not having vertigo now but has R hearing loss.    She usually stays in [place] from January through May. She plans to drive back to [place] in late Dec.     She still has the port and flushes that.     She got both covid shots.    She did fall in an ice fishing hole in dec and her leg is still somewhat sore from that-saw ortho in [place].    She has a longstanding history of hypothyroidism on replacement T4, and some seasonal allergies. She was also noted to have an antiphospholipid antibody. This was checked because her sister presented with a mesenteric artery clot.        -  Background PMH, FH, SH  PAST MEDICAL HISTORY: She did have a tonsillectomy at age 15 and some sinus surgery in about  along with a tubal pregnancy and a tubal ligation.   ALLERGIES: She does describe 1 hive after IV contrast material about 20 years ago, so she has been taking prednisone and Benadryl for contrast  scans. She does have some itching with Dilaudid, but still uses it.   SOCIAL HISTORY: She is a never smoker and does not abuse alcohol or other drugs. 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  -          On exam she appeared comfortable with normal affect.   HEENT full EOMs  NECK no obvious goiter or mass  MSK good neck movement  CHEST no resp distress  NEURO  normal mentation and speech   PSYCH Mood good.     -  CBC, LFT, GNE OK, w creat 0.99.  K 3.2  TSH 0.92  Glu 110      -   I reviewed her new CT images today and report and there is no clear PD and that is the formal read; she does have some small lung nodules stable since 2016.     Formal read:  IMPRESSION:  No evidence of recurrent or metastatic disease in the  chest, abdomen, or pelvis.     -  A/P   We discussed a number of issues.      1-sarcoma. abd dedifferentiated liposarcomq.  CHEIKH now.  Last chemotherapy August 2015    We will restage about 11-22.    Contrast allergy requires methylpred; She has steroid for the next scan and takes that.       2-Port issue-has poor veins so she will try to keep it a bit longer and flush regularly.     3-elevated FBS  Creat is fairly stable and glu high.  She will see PCP shortly.     4-neuropathy  We discussed other gabapentin dosing is available to consider.     5-GERD  She will try to wean off the prilosec if possible.       6-Diarrhea  Somewhat stable, sees PCP    7-hypothyroidisim, hearing loss  Stable f/u w PCP    8-recent sudden death of sister and notes some MEDRANO and is not very active  Will see PCP soon about cardiac eval    All questions were addressed and she will call if other questions arise.      Rob Wiseman M.D.  Professor  Hematology, Oncology and Transplantation

## 2021-05-25 ENCOUNTER — VIRTUAL VISIT (OUTPATIENT)
Dept: ONCOLOGY | Facility: CLINIC | Age: 66
End: 2021-05-25
Attending: INTERNAL MEDICINE
Payer: COMMERCIAL

## 2021-05-25 DIAGNOSIS — R73.09 ELEVATED GLUCOSE: ICD-10-CM

## 2021-05-25 DIAGNOSIS — M54.50 ACUTE RIGHT-SIDED LOW BACK PAIN WITHOUT SCIATICA: ICD-10-CM

## 2021-05-25 DIAGNOSIS — H81.11 BENIGN PAROXYSMAL POSITIONAL VERTIGO OF RIGHT EAR: ICD-10-CM

## 2021-05-25 DIAGNOSIS — R39.89 PNEUMATURIA: ICD-10-CM

## 2021-05-25 DIAGNOSIS — E03.4 HYPOTHYROIDISM DUE TO ACQUIRED ATROPHY OF THYROID: ICD-10-CM

## 2021-05-25 DIAGNOSIS — R79.89 ELEVATED SERUM CREATININE: ICD-10-CM

## 2021-05-25 DIAGNOSIS — C49.9 SARCOMA OF SOFT TISSUE (H): ICD-10-CM

## 2021-05-25 DIAGNOSIS — R10.9 FLANK PAIN: ICD-10-CM

## 2021-05-25 DIAGNOSIS — D68.61 ANTIPHOSPHOLIPID ANTIBODY SYNDROME (H): ICD-10-CM

## 2021-05-25 DIAGNOSIS — Z91.041 RADIOGRAPHIC DYE ALLERGY STATUS: ICD-10-CM

## 2021-05-25 PROCEDURE — 999N001193 HC VIDEO/TELEPHONE VISIT; NO CHARGE

## 2021-05-25 PROCEDURE — 85025 COMPLETE CBC W/AUTO DIFF WBC: CPT | Mod: GT | Performed by: INTERNAL MEDICINE

## 2021-05-25 PROCEDURE — 99214 OFFICE O/P EST MOD 30 MIN: CPT | Mod: 95 | Performed by: INTERNAL MEDICINE

## 2021-05-25 PROCEDURE — 80053 COMPREHEN METABOLIC PANEL: CPT | Mod: GT | Performed by: INTERNAL MEDICINE

## 2021-05-25 NOTE — LETTER
"    5/25/2021         RE: Shiela Hewitt  [address] [facility] [address]        Dear Colleague,    Thank you for referring your patient, Shiela Hewitt, to the [facility]. Please see a copy of my visit note below.    Shiela is a 65 year old who is being evaluated via a billable video visit.      How would you like to obtain your AVS? MyChart  If the video visit is dropped, the invitation should be resent by: Text to cell phone: [phone]  Will anyone else be joining your video visit? Yes: - Андрей. How would they like to receive their invitation? Text to cell phone: [phone]   Vitals - Patient Reported  Weight (Patient Reported): 74.8 kg (165 lb)  Height (Patient Reported): 174 cm (5' 8.5\")  BMI (Based on Pt Reported Ht/Wt): 24.72  Pain Score: No Pain (0)    Kathy Gutierrez CMA on 5/25/2021 at 10:26 AM        Video Start Time:1042  Video-Visit Details    Type of service:  Video Visit    Video End [id number]    Originating Location (pt. Location):home    Distant Location (provider location):  [facility]     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   -  Background  In brief, she noted an abdominal mass at the end of 2013, which was quite large and was resected on 01/28/2014. This tumor was about 26 cm in greatest diameter, high-grade, with necrosis present. In retrospect, she had had a CT scan done about 2 years before that, which was felt to be negative. In 08/2014, repeat imaging revealed a 3.5 cm mass near the psoas, and she received preoperative radiation therapy for this. 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 A new margin was obtained, but this was still positive for liposarcoma. All visible tumor was removed.      A recurrence was noted in the abd and she began " lipodox/ifos about 3-19-15 and had 6 cycles; the last in 2015.  -  She was admitted for partial SBO earlier in 2018.  -        Interval history        Sandro is doing pretty very well now though her sister and mom  in the last month and shes the executor or her mom.  She was very active walking an hour daily in [place] and doing exercise and golfing.     She did note some MEDRANO going up the clinic 2 floors recently.  She has a PCP appt soon and plans to get a heart check then since her sister  of an MI.  She has some weakness in the right leg , stable, and if that does not affect her life.    She does have chronic diarrhea that depends on what she eats; still an issue. She has a history of gi issues; no imodium now as that can cause diarrhea; shes concerned about getting another SBO.  she did have another SBO the past winter for 6-7 hours.  She had some colitis episodes in the past.     She has occ GERD; on omeprazole.  She was off omeprazole but finds she needs it.    Her toes were bothering her and she is on gabapentin that helps the toes and also hot flashes but had thinking issues above 300 mg every day; this is a burning in the feet.    Vertigo is OK now.  In 2018 she had several episodes of vertigo and developed noise in her R ear requiring hospitalization.  She is not having vertigo now but has R hearing loss.    She usually stays in [place] from January through May. She plans to drive back to [place] in late Dec.     She still has the port and flushes that.     She got both covid shots.    She did fall in an ice fishing hole in dec and her leg is still somewhat sore from that-saw ortho in [place].    She has a longstanding history of hypothyroidism on replacement T4, and some seasonal allergies. She was also noted to have an antiphospholipid antibody. This was checked because her sister presented with a mesenteric artery clot.        -  Background PMH, FH, SH  PAST MEDICAL HISTORY: She did have a  tonsillectomy at age 15 and some sinus surgery in about 1994 along with a tubal pregnancy and a tubal ligation.   ALLERGIES: She does describe 1 hive after IV contrast material about 20 years ago, so she has been taking prednisone and Benadryl for contrast scans. She does have some itching with Dilaudid, but still uses it.   SOCIAL HISTORY: She is a never smoker and does not abuse alcohol or other drugs. 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  -          On exam she appeared comfortable with normal affect.   HEENT full EOMs  NECK no obvious goiter or mass  MSK good neck movement  CHEST no resp distress  NEURO  normal mentation and speech   PSYCH Mood good.     -  CBC, LFT, GNE OK, w creat 0.99.  K 3.2  TSH 0.92  Glu 110      -   I reviewed her new CT images today and report and there is no clear PD and that is the formal read; she does have some small lung nodules stable since 2016.     Formal read:  IMPRESSION:  No evidence of recurrent or metastatic disease in the  chest, abdomen, or pelvis.     -  A/P   We discussed a number of issues.      1-sarcoma. abd dedifferentiated liposarcomq.  CHEIKH now.  Last chemotherapy August 2015    We will restage about 11-22.    Contrast allergy requires methylpred; She has steroid for the next scan and takes that.       2-Port issue-has poor veins so she will try to keep it a bit longer and flush regularly.     3-elevated FBS  Creat is fairly stable and glu high.  She will see PCP shortly.     4-neuropathy  We discussed other gabapentin dosing is available to consider.     5-GERD  She will try to wean off the prilosec if possible.       6-Diarrhea  Somewhat stable, sees PCP    7-hypothyroidisim, hearing loss  Stable f/u w PCP    8-recent sudden death of sister and notes some MEDRANO and is not very active  Will see PCP soon about cardiac eval    All questions were addressed and she will call if other questions arise.      Rob DECKER  ELAINA Wiseman.  Professor  Hematology, Oncology and Transplantation      Again, thank you for allowing me to participate in the care of your patient.        Sincerely,        Rob Wiseman MD

## 2021-06-01 ENCOUNTER — OFFICE VISIT (OUTPATIENT)
Dept: FAMILY MEDICINE | Facility: CLINIC | Age: 66
End: 2021-06-01
Payer: COMMERCIAL

## 2021-06-01 VITALS
HEART RATE: 72 BPM | BODY MASS INDEX: 24.29 KG/M2 | WEIGHT: 164 LBS | TEMPERATURE: 98.2 F | SYSTOLIC BLOOD PRESSURE: 112 MMHG | DIASTOLIC BLOOD PRESSURE: 74 MMHG | HEIGHT: 69 IN

## 2021-06-01 DIAGNOSIS — Z00.00 ENCOUNTER FOR MEDICARE ANNUAL WELLNESS EXAM: Primary | ICD-10-CM

## 2021-06-01 DIAGNOSIS — D70.9 FEBRILE NEUTROPENIA (H): ICD-10-CM

## 2021-06-01 DIAGNOSIS — R50.81 FEBRILE NEUTROPENIA (H): ICD-10-CM

## 2021-06-01 DIAGNOSIS — Z78.0 ENCOUNTER FOR OSTEOPOROSIS SCREENING IN ASYMPTOMATIC POSTMENOPAUSAL PATIENT: ICD-10-CM

## 2021-06-01 DIAGNOSIS — R07.9 CHEST PAIN, UNSPECIFIED TYPE: ICD-10-CM

## 2021-06-01 DIAGNOSIS — Z13.220 LIPID SCREENING: ICD-10-CM

## 2021-06-01 DIAGNOSIS — Z13.820 ENCOUNTER FOR OSTEOPOROSIS SCREENING IN ASYMPTOMATIC POSTMENOPAUSAL PATIENT: ICD-10-CM

## 2021-06-01 DIAGNOSIS — R73.03 PRE-DIABETES: ICD-10-CM

## 2021-06-01 DIAGNOSIS — R06.02 SOB (SHORTNESS OF BREATH) ON EXERTION: ICD-10-CM

## 2021-06-01 DIAGNOSIS — K56.609 SBO (SMALL BOWEL OBSTRUCTION) (H): ICD-10-CM

## 2021-06-01 DIAGNOSIS — Z12.31 ENCOUNTER FOR SCREENING MAMMOGRAM FOR MALIGNANT NEOPLASM OF BREAST: ICD-10-CM

## 2021-06-01 DIAGNOSIS — G62.2 POLYNEUROPATHY DUE TO OTHER TOXIC AGENTS (H): ICD-10-CM

## 2021-06-01 DIAGNOSIS — C48.0 LIPOSARCOMA OF RETROPERITONEUM (H): ICD-10-CM

## 2021-06-01 PROBLEM — E11.9 TYPE 2 DIABETES MELLITUS WITHOUT COMPLICATION, WITHOUT LONG-TERM CURRENT USE OF INSULIN (H): Status: RESOLVED | Noted: 2021-06-01 | Resolved: 2021-06-01

## 2021-06-01 PROBLEM — E11.9 TYPE 2 DIABETES MELLITUS WITHOUT COMPLICATION, WITHOUT LONG-TERM CURRENT USE OF INSULIN (H): Status: ACTIVE | Noted: 2021-06-01

## 2021-06-01 PROCEDURE — 99214 OFFICE O/P EST MOD 30 MIN: CPT | Mod: 25 | Performed by: FAMILY MEDICINE

## 2021-06-01 PROCEDURE — 93000 ELECTROCARDIOGRAM COMPLETE: CPT | Performed by: FAMILY MEDICINE

## 2021-06-01 PROCEDURE — G0438 PPPS, INITIAL VISIT: HCPCS | Performed by: FAMILY MEDICINE

## 2021-06-01 RX ORDER — GABAPENTIN 300 MG/1
300 CAPSULE ORAL 2 TIMES DAILY
Qty: 180 CAPSULE | Refills: 3 | Status: SHIPPED | OUTPATIENT
Start: 2021-06-01 | End: 2022-08-01

## 2021-06-01 ASSESSMENT — ENCOUNTER SYMPTOMS
PALPITATIONS: 1
ABDOMINAL PAIN: 1
BREAST MASS: 0
DIARRHEA: 1
SHORTNESS OF BREATH: 1

## 2021-06-01 ASSESSMENT — MIFFLIN-ST. JEOR: SCORE: 1350.34

## 2021-06-01 ASSESSMENT — ACTIVITIES OF DAILY LIVING (ADL): CURRENT_FUNCTION: NO ASSISTANCE NEEDED

## 2021-06-01 NOTE — PROGRESS NOTES
"SUBJECTIVE:   Shiela Hewitt is a 65 year old female who presents for Preventive Visit.      Patient has been advised of split billing requirements and indicates understanding: Yes   Are you in the first 12 months of your Medicare coverage?  No    Healthy Habits:     In general, how would you rate your overall health?  Good    Frequency of exercise:  2-3 days/week    Duration of exercise:  15-30 minutes    Do you usually eat at least 4 servings of fruit and vegetables a day, include whole grains    & fiber and avoid regularly eating high fat or \"junk\" foods?  No    Taking medications regularly:  Yes    Medication side effects:  None    Ability to successfully perform activities of daily living:  No assistance needed    Home Safety:  No safety concerns identified    Hearing Impairment:  Difficulty following a conversation in a noisy restaurant or crowded room, need to ask people to speak up or repeat themselves and difficulty understanding soft or whispered speech    In the past 6 months, have you been bothered by leaking of urine?  No    In general, how would you rate your overall mental or emotional health?  Good      PHQ-2 Total Score: 0    Additional concerns today:  Yes    * discuss referral for DEXA       * SOB with exertion for the past 6 months.  She noticed exertional dyspnea going up 2 flights of stairs.  Her sister recently  of an MI.  She is a lifelong non-smoker.  She does not have a history of asthma.  She does not have any recent EKGs and has never had a stress test..  She does not have hypertension.  Her last lipid profile on 2020 showed a total cholesterol 161 and an LDL of 71.  She had a CT of the chest abdomen and pelvis on 2021 which showed 3 stable lung nodules.  It was her twin sister who .  Her twin was obese with diabetes and not taking care of herself.  There was also pain in the center of her chest which resolved with rest.  It was when she was helping plant garden last " week.  She has had the pain and sob but not always together but both for 6 months.  It is occasionally sharp but mostly dull.  It can occur with sitting or exertion.  She stopped aspirin due to hearing loss.      HPI: The patient is followed by oncology for her liposarcoma.  This was discovered in 2013.  She has had surgery and radiation for this.  She does neuropathy from her radiation for cancer treatment and takes gabapentin.    She is a history of chronic diarrhea depending on what she eats.  She also has a history of a small bowel obstruction.  She has a history of colitis as well.  She has had 14 SBO's with the last one yesterday.  She was in [place] twice in 2020.      She had an elevated fasting blood sugar of 125 November 2020.  At that time her A1c was 5.2.  She had labs on 5/21/2021 which showed her fasting blood sugar to be 110.  An A1c was not done at that time.  Of note her potassium was low at 3.2.    Do you feel safe in your environment? Yes    Have you ever done Advance Care Planning? (For example, a Health Directive, POLST, or a discussion with a medical provider or your loved ones about your wishes): Yes, patient states has an Advance Care Planning document and will bring a copy to the clinic.       Fall risk  Fallen 2 or more times in the past year?: No  Any fall with injury in the past year?: No    Cognitive Screening   1) Repeat 3 items (Leader, Season, Table)    2) Clock draw: NORMAL  3) 3 item recall: Recalls 3 objects  Results: NORMAL clock, 3 items recalled: COGNITIVE IMPAIRMENT LESS LIKELY    Mini-CogTM Copyright REBECA Rowe. Licensed by the author for use in [facility]; reprinted with permission (adriel@.[facility]). All rights reserved.          Reviewed and updated as needed this visit by clinical staff  Tobacco  Allergies  Meds   Med Hx  Surg Hx  Fam Hx  Soc Hx        Reviewed and updated as needed this visit by Provider                Social History     Tobacco Use      Smoking status: Never Smoker     Smokeless tobacco: Never Used   Substance Use Topics     Alcohol use: Yes     Comment: not much     If you drink alcohol do you typically have >3 drinks per day or >7 drinks per week? No    Alcohol Use 6/1/2021   Prescreen: >3 drinks/day or >7 drinks/week? No   Prescreen: >3 drinks/day or >7 drinks/week? -   No flowsheet data found.        Hypothyroidism Follow-up      Since last visit, patient describes the following symptoms: hair loss  TSH   Date Value Ref Range Status   05/21/2021 0.92 0.40 - 4.00 mU/L Final       Synthroid 75 mcg daily      Current providers sharing in care for this patient include:   Patient Care Team:  Mindy Vieira DO as PCP - General (Family Practice)  Sergio Haider MD as MD (General Surgery)  Rob Wiseman MD as MD (Hematology & Oncology)  Mindy Vieira DO as Assigned PCP  Pearl Alberto RN as Nurse Coordinator (Oncology)  Rob Wiseman MD as Assigned Cancer Care Provider    The following health maintenance items are reviewed in Epic and correct as of today:  Health Maintenance Due   Topic Date Due     MICROALBUMIN  Never done     DIABETIC FOOT EXAM  Never done     ANNUAL REVIEW OF HM ORDERS  Never done     EYE EXAM  Never done     COVID-19 Vaccine (1) Never done     HIV SCREENING  Never done     Pneumococcal Vaccine: Pediatrics (0 to 5 Years) and At-Risk Patients (6 to 64 Years) (2 of 4 - PPSV23) 11/18/2017     ZOSTER IMMUNIZATION (2 of 3) 11/18/2017     PAP  05/23/2020     A1C  02/23/2021     BP Readings from Last 3 Encounters:   06/01/21 112/74   11/23/20 125/74   12/05/19 107/71    Wt Readings from Last 3 Encounters:   06/01/21 74.4 kg (164 lb)   11/23/20 78 kg (171 lb 14.4 oz)   12/05/19 77.1 kg (170 lb)                  Pneumonia Vaccine:Adults age 65+ who received Pneumovax (PPSV23) at 65 years or older: Should be given PCV13 > 1 year after their most recent PPSV23  Any new diagnosis of family breast, ovarian, or bowel  "cancer? No    FSH-7: No flowsheet data found.  click delete button to remove this line now  Mammogram Screening: Recommended mammography every 1-2 years with patient discussion and risk factor consideration  Pertinent mammograms are reviewed under the imaging tab.    Review of Systems   HENT: Positive for hearing loss.    Respiratory: Positive for shortness of breath.    Cardiovascular: Positive for chest pain and palpitations.   Gastrointestinal: Positive for abdominal pain and diarrhea.   Breasts:  Negative for tenderness, breast mass and discharge.   Genitourinary: Negative for pelvic pain, vaginal bleeding and vaginal discharge.         OBJECTIVE:   /74   Pulse 72   Temp 98.2  F (36.8  C) (Oral)   Ht 1.74 m (5' 8.5\")   Wt 74.4 kg (164 lb)   BMI 24.57 kg/m   Estimated body mass index is 24.57 kg/m  as calculated from the following:    Height as of this encounter: 1.74 m (5' 8.5\").    Weight as of this encounter: 74.4 kg (164 lb).  Physical Exam  GENERAL: healthy, alert and no distress  EYES: Eyes grossly normal to inspection, PERRL and conjunctivae and sclerae normal  HENT: ear canals and TM's normal, nose and mouth without ulcers or lesions  NECK: no adenopathy, no asymmetry, masses, or scars and thyroid normal to palpation  RESP: lungs clear to auscultation - no rales, rhonchi or wheezes  BREAST: normal without masses, tenderness or nipple discharge and no palpable axillary masses or adenopathy  CV: regular rate and rhythm, normal S1 S2, no S3 or S4, no murmur, click or rub, no peripheral edema and peripheral pulses strong  ABDOMEN: soft, nontender, no hepatosplenomegaly, no masses and bowel sounds normal  MS: no gross musculoskeletal defects noted, no edema  SKIN: no suspicious lesions or rashes  NEURO: Normal strength and tone, mentation intact and speech normal  PSYCH: mentation appears normal, affect normal/bright    Diagnostic Test Results:  EKG - unremarkable    ASSESSMENT / PLAN:   1. " Encounter for Medicare annual wellness exam      2. Chest pain, unspecified type    The patient has a strong family history of heart disease in both of her parents.  Her twin sister  of a sudden MI last month.  The patient has had some atypical chest pain so we will get coronary CT and have her see cardiology    - EKG 12-lead complete w/read - Clinics  - CT Coronary Calcium Scan; Future  - CARDIOLOGY EVAL ADULT REFERRAL; Future  - OFFICE/OUTPT VISIT,EST,LEVL IV    3. SOB (shortness of breath) on exertion  The patient had recent lung CT which showed stable pulmonary nodules.  She does not have a history of asthma or tobacco abuse.  We will do cardiac work-up above    - EKG 12-lead complete w/read - Clinics  - CT Coronary Calcium Scan; Future  - CARDIOLOGY EVAL ADULT REFERRAL; Future  - OFFICE/OUTPT VISIT,EST,LEVL IV    4. 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    - gabapentin (NEURONTIN) 300 MG capsule; Take 1 capsule (300 mg) by mouth 2 times daily  Dispense: 180 capsule; Refill: 3    6. Pre-diabetes  This patient remains in the prediabetic range so taken diabetes off of her problem list.  We will have her do labs of A1c and fasting glucose in November  - **Basic metabolic panel FUTURE anytime; Future  - **A1C FUTURE anytime; Future  - OFFICE/OUTPT VISIT,EST,LEVL IV    7. SBO (small bowel obstruction) (H)  This is a recurrent problem for her.  It is stable at this time    8. Encounter for osteoporosis screening in asymptomatic postmenopausal patient    - DX Hip/Pelvis/Spine; Future    9. Encounter for screening mammogram for malignant neoplasm of breast    - MA Screen Bilateral w/Saji; Future    10. Lipid screening    - Lipid panel reflex to direct LDL Fasting; Future      COUNSELING:  Reviewed preventive health counseling, as reflected in patient  "instructions       Regular exercise       Healthy diet/nutrition    Estimated body mass index is 24.57 kg/m  as calculated from the following:    Height as of this encounter: 1.74 m (5' 8.5\").    Weight as of this encounter: 74.4 kg (164 lb).        She reports that she has never smoked. She has never used smokeless tobacco.      Appropriate preventive services were discussed with this patient, including applicable screening as appropriate for cardiovascular disease, diabetes, osteopenia/osteoporosis, and glaucoma.  As appropriate for age/gender, discussed screening for colorectal cancer, prostate cancer, breast cancer, and cervical cancer. Checklist reviewing preventive services available has been given to the patient.    Reviewed patients plan of care and provided an AVS. The Basic Care Plan (routine screening as documented in Health Maintenance) for Shiela meets the Care Plan requirement. This Care Plan has been established and reviewed with the Patient.    Counseling Resources:  ATP IV Guidelines  Pooled Cohorts Equation Calculator  Breast Cancer Risk Calculator  Breast Cancer: Medication to Reduce Risk  FRAX Risk Assessment  ICSI Preventive Guidelines  Dietary Guidelines for Americans, 2010  USDA's MyPlate  ASA Prophylaxis  Lung CA Screening    DO MELONY [facility]    Identified Health Risks:  "

## 2021-06-08 ENCOUNTER — TELEPHONE (OUTPATIENT)
Dept: CARDIOLOGY | Facility: CLINIC | Age: 66
End: 2021-06-08

## 2021-06-16 ENCOUNTER — ANCILLARY PROCEDURE (OUTPATIENT)
Dept: CT IMAGING | Facility: CLINIC | Age: 66
End: 2021-06-16
Attending: FAMILY MEDICINE

## 2021-06-16 DIAGNOSIS — R07.9 CHEST PAIN, UNSPECIFIED TYPE: ICD-10-CM

## 2021-06-16 DIAGNOSIS — R06.02 SOB (SHORTNESS OF BREATH) ON EXERTION: ICD-10-CM

## 2021-06-16 PROCEDURE — 75571 CT HRT W/O DYE W/CA TEST: CPT | Mod: GC | Performed by: INTERNAL MEDICINE

## 2021-06-29 PROCEDURE — 96523 IRRIG DRUG DELIVERY DEVICE: CPT

## 2021-06-29 PROCEDURE — 250N000011 HC RX IP 250 OP 636: Performed by: INTERNAL MEDICINE

## 2021-06-29 RX ORDER — HEPARIN SODIUM (PORCINE) LOCK FLUSH IV SOLN 100 UNIT/ML 100 UNIT/ML
5 SOLUTION INTRAVENOUS ONCE
Status: COMPLETED | OUTPATIENT
Start: 2021-06-29 | End: 2021-06-29

## 2021-06-29 RX ADMIN — Medication 5 ML: at 10:04

## 2021-06-29 NOTE — NURSING NOTE
Chief Complaint   Patient presents with     Port Flush     Port flushed with saline and heparin     Port accessed with 20 gauge gripper needle by RN in lab.  Port flushed with saline and heparin.     Skylar Dangelo RN

## 2021-07-10 ENCOUNTER — HEALTH MAINTENANCE LETTER (OUTPATIENT)
Age: 66
End: 2021-07-10

## 2021-07-20 ENCOUNTER — MYC MEDICAL ADVICE (OUTPATIENT)
Dept: FAMILY MEDICINE | Facility: CLINIC | Age: 66
End: 2021-07-20

## 2021-08-11 ENCOUNTER — ANCILLARY PROCEDURE (OUTPATIENT)
Dept: MAMMOGRAPHY | Facility: CLINIC | Age: 66
End: 2021-08-11
Attending: FAMILY MEDICINE
Payer: MEDICARE

## 2021-08-11 DIAGNOSIS — Z12.31 ENCOUNTER FOR SCREENING MAMMOGRAM FOR MALIGNANT NEOPLASM OF BREAST: ICD-10-CM

## 2021-08-11 PROCEDURE — 77063 BREAST TOMOSYNTHESIS BI: CPT | Mod: TC | Performed by: RADIOLOGY

## 2021-08-11 PROCEDURE — 77067 SCR MAMMO BI INCL CAD: CPT | Mod: TC | Performed by: RADIOLOGY

## 2021-08-12 ENCOUNTER — MYC MEDICAL ADVICE (OUTPATIENT)
Dept: FAMILY MEDICINE | Facility: CLINIC | Age: 66
End: 2021-08-12

## 2021-08-12 NOTE — TELEPHONE ENCOUNTER
Patient Quality Outreach      Summary:    Patient has the following on her problem list/HM:     Diabetes    Last A1C:  Lab Results   Component Value Date    A1C 5.2 11/23/2020    A1C 5.4 06/12/2020       Last LDL:    Lab Results   Component Value Date    LDL 71 11/23/2020       Is the patient on a Statin? No          Is the patient on Aspirin? No        Last three blood pressure readings:  BP Readings from Last 3 Encounters:   06/01/21 112/74   11/23/20 125/74   12/05/19 107/71            Tobacco Use      Smoking status: Never Smoker      Smokeless tobacco: Never Used          Patient is due/failing the following:   Diabetic Follow-Up Visit    Type of outreach:    None needed at this time- Patient had Physical 6/1/21  Pre-diabetes  This patient remains in the prediabetic range so taken diabetes off of her problem list.  We will have her do labs of A1c and fasting glucose in November  - **Basic metabolic panel FUTURE anytime; Future  - **A1C FUTURE anytime; Future  - OFFICE/OUTPT VISIT,EST,LEVL IV    Questions for provider review:    None                                                                                                                                       Belinda Bello MA

## 2021-08-13 ENCOUNTER — LAB (OUTPATIENT)
Dept: LAB | Facility: CLINIC | Age: 66
End: 2021-08-13
Attending: INTERNAL MEDICINE
Payer: MEDICARE

## 2021-08-13 PROCEDURE — 96523 IRRIG DRUG DELIVERY DEVICE: CPT

## 2021-08-13 PROCEDURE — 250N000011 HC RX IP 250 OP 636: Performed by: INTERNAL MEDICINE

## 2021-08-13 RX ORDER — HEPARIN SODIUM (PORCINE) LOCK FLUSH IV SOLN 100 UNIT/ML 100 UNIT/ML
5 SOLUTION INTRAVENOUS ONCE
Status: COMPLETED | OUTPATIENT
Start: 2021-08-13 | End: 2021-08-13

## 2021-08-13 RX ADMIN — Medication 5 ML: at 10:29

## 2021-08-13 NOTE — NURSING NOTE
Chief Complaint   Patient presents with     Port Flush     Port flushed by RN in lab. Vs taken.     Port accessed with 20g gripper needle by RN, no labs per patient, line flushed with saline and heparin.      Dionne MURRIETA RN PHN BSN  BMT/Oncology Lab

## 2021-08-19 ENCOUNTER — ANCILLARY PROCEDURE (OUTPATIENT)
Dept: MAMMOGRAPHY | Facility: CLINIC | Age: 66
End: 2021-08-19
Attending: FAMILY MEDICINE
Payer: MEDICARE

## 2021-08-19 ENCOUNTER — OFFICE VISIT (OUTPATIENT)
Dept: CARDIOLOGY | Facility: CLINIC | Age: 66
End: 2021-08-19
Attending: FAMILY MEDICINE
Payer: MEDICARE

## 2021-08-19 VITALS
DIASTOLIC BLOOD PRESSURE: 71 MMHG | WEIGHT: 162 LBS | SYSTOLIC BLOOD PRESSURE: 116 MMHG | BODY MASS INDEX: 24.27 KG/M2 | OXYGEN SATURATION: 98 % | HEART RATE: 71 BPM

## 2021-08-19 DIAGNOSIS — R07.9 CHEST PAIN, UNSPECIFIED TYPE: ICD-10-CM

## 2021-08-19 DIAGNOSIS — R92.8 ABNORMAL MAMMOGRAM: ICD-10-CM

## 2021-08-19 DIAGNOSIS — R06.02 SOB (SHORTNESS OF BREATH) ON EXERTION: ICD-10-CM

## 2021-08-19 PROCEDURE — 76642 ULTRASOUND BREAST LIMITED: CPT | Mod: RT

## 2021-08-19 PROCEDURE — 99204 OFFICE O/P NEW MOD 45 MIN: CPT | Performed by: INTERNAL MEDICINE

## 2021-08-19 PROCEDURE — 77061 BREAST TOMOSYNTHESIS UNI: CPT | Mod: RT

## 2021-08-19 PROCEDURE — 77065 DX MAMMO INCL CAD UNI: CPT | Mod: RT

## 2021-08-19 NOTE — PATIENT INSTRUCTIONS
Thank you for coming to the [facility]; please note the following instructions:    1. Exercise stress Echo with a follow up appointment for results        If you have any questions regarding your visit please contact your care team:     Cardiology  Telephone Number   Wendy DUMONT, RN  Analisa BALDWIN, RN   Christie BARNARD, RMTITO CERON, RMTITO JURADO, LPN   [phone] (option 1)   For scheduling appts:     [phone] (select option 1)       For the Device Clinic (Pacemakers and ICD's)  RN's :  Summer Enriquez   During business hours: [phone]    *After business hours:  [phone] (select option 4)      Normal test result notifications will be released via [employer] or mailed within 7 business days.  All other test results, will be communicated via telephone once reviewed by your cardiologist.    If you need a medication refill please contact your pharmacy.  Please allow 3 business days for your refill to be completed.    As always, thank you for trusting us with your health care needs!

## 2021-08-19 NOTE — LETTER
2021       RE: Shiela Hewitt  [address] [place]   [facility] [address]       Dear Colleague,    Thank you for the opportunity to participate in the care of your patient, Shiela Hewitt, at the [facility]. Please see a copy of my visit note below.       SUBJECTIVE:  Shiela Hewitt is a 66 year old female who presents for evaluation of chest pain and dyspnea on exertion.  Previously active healthy and asymptomatic.  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  Her only cardiac risk factor is premature coronary artery disease in family.  Her twin sister  suddenly though no autopsy was done suspected myocardial infarction.  Her father also had multiple premature MIs.  She has no prior cardiac history.  No diabetes, hypertension or hyperlipidemia.  Non-smoker.  Currently she is not on any cardiac medications.  For 6 months she is noticing dyspnea on exertion.  While climbing steps patient feels short of breath.  Relieved by rest.  She is very concerned about cardiac illness.  Also had 2 episodes of chest pain at rest while she was in [place].  Pain lasted for an hour without any associated symptoms.  Since then no recurrence.  Had no exertional chest pain ever.  18 years ago patient was told that she has trace mitral regurgitation and advised endocarditis prophylaxis.  Patient Active Problem List    Diagnosis Date Noted     Febrile neutropenia (H) 2015     Priority: Medium     Diagnosis updated by automated process. Provider to review and confirm.       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  Current Outpatient Medications   Medication Sig     acyclovir (ZOVIRAX) 5 % external ointment 1 application to affected area     gabapentin (NEURONTIN) 300 MG capsule Take 1 capsule (300 mg) by mouth 2 times daily     levothyroxine (SYNTHROID/LEVOTHROID) 75 MCG tablet Take 1 tablet (75 mcg) by mouth daily     omeprazole (PRILOSEC) 40 MG DR capsule Take 1 capsule (40 mg) by mouth daily     prochlorperazine (COMPAZINE) 10 MG tablet Take 1 tablet (10 mg) by mouth every 6 hours as needed for nausea or vomiting     meclizine (ANTIVERT) 25 MG tablet 1 tablet     UNABLE TO FIND Apply 1 drop to eye      valACYclovir (VALTREX) 500 MG tablet Take 500 mg by mouth     No current facility-administered medications for this visit.     Past Medical History:   Diagnosis Date     Antiphospholipid antibody positive      Constipation      Gastro-oesophageal reflux disease      History of blood transfusion 2014     Hydronephrosis      Hypothyroidism      Liposarcoma (H)      Sinusitis      Type 2 diabetes mellitus without complication, without long-term current use of insulin (H) 6/1/2021     Past Surgical History:   Procedure Laterality Date     APPENDECTOMY  1/2014     BIOPSY  2013     CL AFF SURGICAL PATHOLOGY      Champagne's neuroma excision     COLONOSCOPY  2016     COMBINED CYSTOSCOPY, INSERT STENT URETER(S)  1/28/2014    Procedure: COMBINED CYSTOSCOPY, INSERT STENT URETER(S);  Cystoscopy with Bilateral Stent Placement, Fluoroscopy,  Resection Retroperitoneal Sarcoma, Resection of Right Colon and Terminal Ileum  Anesthesia General with Block;   Surgeon: Nancy Arredondo MD;  [facility]     COMBINED CYSTOSCOPY, INSERT STENT URETER(S) Right 12/3/2014    Procedure: COMBINED CYSTOSCOPY, INSERT STENT URETER(S);  Surgeon: Mehdi Nicholson MD;  Location: [facility] OR     ECTOPIC PREGNANCY SURGERY       ETHMOIDECTOMY       EXCISE MASS ABDOMEN N/A 12/3/2014    Procedure: EXCISE MASS ABDOMEN;  Surgeon: Sergio Haider MD;  Location: [facility] OR     HERNIORRHAPHY INCISIONAL (LOCATION) N/A 12/3/2014    Procedure: HERNIORRHAPHY INCISIONAL (LOCATION);  Surgeon: Sergio Haider MD;  Location[facility]     RESECT TUMOR RETROPERITONEAL  1/28/2014    Procedure: RESECT TUMOR RETROPERITONEAL;;  Surgeon: Sergio Haider MD;  Location: [facility]     TONSILLECTOMY       TUBAL LIGATION       VASCULAR SURGERY  10/08/2010    venous ablation Rt leg     Allergies   Allergen Reactions     Contrast Dye Hives     Patient has prescription for medrol to take prior to scan     Kytril [Granisetron] Other (See Comments)     Kytril causes severe headaches.     No Clinical Screening - See Comments Rash     Ondansetron Other (See Comments)     Zofran may cause headaches.     Social History     Socioeconomic History     Marital status:      Spouse name: Not on file     Number of children: Not on file     Years of education: Not on file     Highest education level: Not on file   Occupational History     Not on file   Tobacco Use     Smoking status: Never Smoker     Smokeless tobacco: Never Used   Substance and Sexual Activity     Alcohol use: Yes     Comment: not much     Drug use: No     Sexual activity: Not Currently     Partners: Male     Birth control/protection: Post-menopausal   Other Topics Concern     Parent/sibling w/ CABG, MI or angioplasty before 65F 55M? Yes     Comment: sister had mesenteric artery bypass and recently passed away   Social History Narrative     Not on file     Social Determinants of Health     Financial Resource Strain:      Difficulty of Paying Living Expenses:     Food Insecurity:      Worried About Running Out of Food in the Last Year:      Ran Out of Food in the Last Year:    Transportation Needs:      Lack of Transportation (Medical):      Lack of Transportation (Non-Medical):    Physical Activity:      Days of Exercise per Week:      Minutes of Exercise per Session:    Stress:      Feeling of Stress :    Social Connections:      Frequency of Communication with Friends and Family:      Frequency of Social Gatherings with Friends and Family:      Attends Bahai Services:      Active Member of Clubs or Organizations:      Attends Club or Organization Meetings:      Marital Status:    Intimate Partner Violence:      Fear of Current or Ex-Partner:      Emotionally Abused:      Physically Abused:      Sexually Abused:      Family History   Problem Relation Age of Onset     Diabetes Sister      Coronary Artery Disease Father      Cerebrovascular Disease Father      Depression Sister      Obesity Sister           REVIEW OF SYSTEMS:  General: negative, fever, chills, night sweats  Skin: negative, acne, rash and scaling  Eyes: negative, double vision, eye pain and photophobia  Ears/Nose/Throat: negative, nasal congestion and purulent rhinorrhea  Respiratory: No cough, No hemoptysis and negative  Cardiovascular: negative, palpitations, tachycardia, irregular heart beat, exertional chest pain or pressure, paroxysmal nocturnal dyspnea and orthopnea       OBJECTIVE:  Blood pressure 116/71, pulse 71, weight 73.5 kg (162 lb), SpO2 98 %.  General Appearance: healthy, alert, active and no distress  Head: Normocephalic. No masses, lesions, tenderness or abnormalities  Eyes: conjuctiva clear, PERRL, EOM intact  Ears: External ears normal. Canals clear. TM's normal.  Nose: Nares normal  Mouth: normal  Neck: Supple, no cervical adenopathy, no thyromegaly  Lungs: clear to auscultation  Cardiac: regular rate and rhythm, normal S1 and S2, no murmur         ASSESSMENT/PLAN:  Patient here for  evaluation of dyspnea on exertion and atypical chest pain.  Symptoms started 6 months ago.  Nonprogressive.  Her twin sister had a sudden cardiac death.  Suspected myocardial infarction though no autopsy was done.  Her father also had multiple MIs prior to the age of 60.  She has no cardiac risk factors apart from the family history.  Lipids are normal.  No hypertension or diabetes.  She is a non-smoker.  She is a retired RN.  Due to her symptoms PCP did an CT calcium score.  Her calcium score was 0.  EKG reviewed.  Normal sinus rhythm normal EKG.  Due to dyspnea on exertion and atypical chest pain we will plan for an exercise stress echo.  This is to assess functional capacity as well as symptoms.  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  Patient will be contacted with the test results.  Per orders.   Return to Clinic as needed.  Total visit duration 45 minutes.  This includes face-to-face interview, physical exam, chart review, review of EKGs, CT calcium score and documentation.      Please do not hesitate to contact me if you have any questions/concerns.     Sincerely,     SURENDRA Mason MD

## 2021-08-19 NOTE — NURSING NOTE
"Chief Complaint   Patient presents with     Chest Pain     Dr Tirado: pt referred in June for chest pain, increase shortness of breath.      Shortness of Breath       Initial /71 (BP Location: Right arm, Patient Position: Chair, Cuff Size: Adult Regular)   Pulse 71   Wt 73.5 kg (162 lb)   SpO2 98%   BMI 24.27 kg/m   Estimated body mass index is 24.27 kg/m  as calculated from the following:    Height as of 6/1/21: 1.74 m (5' 8.5\").    Weight as of this encounter: 73.5 kg (162 lb)..  BP completed using cuff size: regular    Lisa Melo MA  "

## 2021-08-19 NOTE — PROGRESS NOTES
SUBJECTIVE:  Shiela Hewitt is a 66 year old female who presents for evaluation of chest pain and dyspnea on exertion.  Previously active healthy and asymptomatic.  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  Her only cardiac risk factor is premature coronary artery disease in family.  Her twin sister  suddenly though no autopsy was done suspected myocardial infarction.  Her father also had multiple premature MIs.  She has no prior cardiac history.  No diabetes, hypertension or hyperlipidemia.  Non-smoker.  Currently she is not on any cardiac medications.  For 6 months she is noticing dyspnea on exertion.  While climbing steps patient feels short of breath.  Relieved by rest.  She is very concerned about cardiac illness.  Also had 2 episodes of chest pain at rest while she was in [place].  Pain lasted for an hour without any associated symptoms.  Since then no recurrence.  Had no exertional chest pain ever.  18 years ago patient was told that she has trace mitral regurgitation and advised endocarditis prophylaxis.  Patient Active Problem List    Diagnosis Date Noted     Febrile neutropenia (H) 2015     Priority: Medium     Diagnosis updated by automated process. Provider to review and confirm.       Hypothyroidism 2015     Priority: Medium     Antiphospholipid antibody syndrome (H) 2015     Priority: Medium     Checked after sister dx with mesenteric artery clot in her 40's       BPPV (benign paroxysmal positional vertigo) 2015     Priority: Medium     Esophageal reflux 2015     Priority: Medium     History of herpes genitalis 2015     Priority: Medium     Displacement of lumbar intervertebral disc without myelopathy 2015     Priority: Medium     Anemia in neoplastic disease 2015     Priority: Medium     Nausea 2015     Priority: Medium     Liposarcoma of  retroperitoneum (H) 12/03/2014     Priority: Medium    .  Current Outpatient Medications   Medication Sig     acyclovir (ZOVIRAX) 5 % external ointment 1 application to affected area     gabapentin (NEURONTIN) 300 MG capsule Take 1 capsule (300 mg) by mouth 2 times daily     levothyroxine (SYNTHROID/LEVOTHROID) 75 MCG tablet Take 1 tablet (75 mcg) by mouth daily     omeprazole (PRILOSEC) 40 MG DR capsule Take 1 capsule (40 mg) by mouth daily     prochlorperazine (COMPAZINE) 10 MG tablet Take 1 tablet (10 mg) by mouth every 6 hours as needed for nausea or vomiting     meclizine (ANTIVERT) 25 MG tablet 1 tablet     UNABLE TO FIND Apply 1 drop to eye      valACYclovir (VALTREX) 500 MG tablet Take 500 mg by mouth     No current facility-administered medications for this visit.     Past Medical History:   Diagnosis Date     Antiphospholipid antibody positive      Constipation      Gastro-oesophageal reflux disease      History of blood transfusion 2014     Hydronephrosis      Hypothyroidism      Liposarcoma (H)      Sinusitis      Type 2 diabetes mellitus without complication, without long-term current use of insulin (H) 6/1/2021     Past Surgical History:   Procedure Laterality Date     APPENDECTOMY  1/2014     BIOPSY  2013     CL AFF SURGICAL PATHOLOGY      Champagne's neuroma excision     COLONOSCOPY  2016     COMBINED CYSTOSCOPY, INSERT STENT URETER(S)  1/28/2014    Procedure: COMBINED CYSTOSCOPY, INSERT STENT URETER(S);  Cystoscopy with Bilateral Stent Placement, Fluoroscopy,  Resection Retroperitoneal Sarcoma, Resection of Right Colon and Terminal Ileum  Anesthesia General with Block;  Surgeon: Nancy Arredondo MD;  Location: [facility] OR     COMBINED CYSTOSCOPY, INSERT STENT URETER(S) Right 12/3/2014    Procedure: COMBINED CYSTOSCOPY, INSERT STENT URETER(S);  Surgeon: Mehdi Nicholson MD;  Location: [facility]     ECTOPIC PREGNANCY SURGERY       ETHMOIDECTOMY       EXCISE MASS ABDOMEN N/A 12/3/2014    Procedure:  EXCISE MASS ABDOMEN;  Surgeon: Sergio Haider MD;  Location: [facility]     HERNIORRHAPHY INCISIONAL (LOCATION) N/A 12/3/2014    Procedure: HERNIORRHAPHY INCISIONAL (LOCATION);  Surgeon: Sergio Haider MD;  [facility]: [facility]     RESECT TUMOR RETROPERITONEAL  1/28/2014    Procedure: RESECT TUMOR RETROPERITONEAL;;  Surgeon: Sergio Haider MD;  [facility]     TONSILLECTOMY       TUBAL LIGATION       VASCULAR SURGERY  10/08/2010    venous ablation Rt leg     Allergies   Allergen Reactions     Contrast Dye Hives     Patient has prescription for medrol to take prior to scan     Kytril [Granisetron] Other (See Comments)     Kytril causes severe headaches.     No Clinical Screening - See Comments Rash     Ondansetron Other (See Comments)     Zofran may cause headaches.     Social History     Socioeconomic History     Marital status:      Spouse name: Not on file     Number of children: Not on file     Years of education: Not on file     Highest education level: Not on file   Occupational History     Not on file   Tobacco Use     Smoking status: Never Smoker     Smokeless tobacco: Never Used   Substance and Sexual Activity     Alcohol use: Yes     Comment: not much     Drug use: No     Sexual activity: Not Currently     Partners: Male     Birth control/protection: Post-menopausal   Other Topics Concern     Parent/sibling w/ CABG, MI or angioplasty before 65F 55M? Yes     Comment: sister had mesenteric artery bypass and recently passed away   Social History Narrative     Not on file     Social Determinants of Health     Financial Resource Strain:      Difficulty of Paying Living Expenses:    Food Insecurity:      Worried About Running Out of Food in the Last Year:      Ran Out of Food in the Last Year:    Transportation Needs:      Lack of Transportation (Medical):      Lack of Transportation (Non-Medical):    Physical Activity:      Days of Exercise per Week:      Minutes of Exercise per Session:    Stress:       Feeling of Stress :    Social Connections:      Frequency of Communication with Friends and Family:      Frequency of Social Gatherings with Friends and Family:      Attends Mosque Services:      Active Member of Clubs or Organizations:      Attends Club or Organization Meetings:      Marital Status:    Intimate Partner Violence:      Fear of Current or Ex-Partner:      Emotionally Abused:      Physically Abused:      Sexually Abused:      Family History   Problem Relation Age of Onset     Diabetes Sister      Coronary Artery Disease Father      Cerebrovascular Disease Father      Depression Sister      Obesity Sister           REVIEW OF SYSTEMS:  General: negative, fever, chills, night sweats  Skin: negative, acne, rash and scaling  Eyes: negative, double vision, eye pain and photophobia  Ears/Nose/Throat: negative, nasal congestion and purulent rhinorrhea  Respiratory: No cough, No hemoptysis and negative  Cardiovascular: negative, palpitations, tachycardia, irregular heart beat, exertional chest pain or pressure, paroxysmal nocturnal dyspnea and orthopnea       OBJECTIVE:  Blood pressure 116/71, pulse 71, weight 73.5 kg (162 lb), SpO2 98 %.  General Appearance: healthy, alert, active and no distress  Head: Normocephalic. No masses, lesions, tenderness or abnormalities  Eyes: conjuctiva clear, PERRL, EOM intact  Ears: External ears normal. Canals clear. TM's normal.  Nose: Nares normal  Mouth: normal  Neck: Supple, no cervical adenopathy, no thyromegaly  Lungs: clear to auscultation  Cardiac: regular rate and rhythm, normal S1 and S2, no murmur         ASSESSMENT/PLAN:  Patient here for evaluation of dyspnea on exertion and atypical chest pain.  Symptoms started 6 months ago.  Nonprogressive.  Her twin sister had a sudden cardiac death.  Suspected myocardial infarction though no autopsy was done.  Her father also had multiple MIs prior to the age of 60.  She has no cardiac risk factors apart from the family  history.  Lipids are normal.  No hypertension or diabetes.  She is a non-smoker.  She is a retired RN.  Due to her symptoms PCP did an CT calcium score.  Her calcium score was 0.  EKG reviewed.  Normal sinus rhythm normal EKG.  Due to dyspnea on exertion and atypical chest pain we will plan for an exercise stress echo.  This is to assess functional capacity as well as symptoms.  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  Patient will be contacted with the test results.  Per orders.   Return to Clinic as needed.  Total visit duration 45 minutes.  This includes face-to-face interview, physical exam, chart review, review of EKGs, CT calcium score and documentation.

## 2021-08-31 ENCOUNTER — ANCILLARY PROCEDURE (OUTPATIENT)
Dept: CARDIOLOGY | Facility: CLINIC | Age: 66
End: 2021-08-31
Attending: INTERNAL MEDICINE
Payer: MEDICARE

## 2021-08-31 DIAGNOSIS — R07.9 CHEST PAIN, UNSPECIFIED TYPE: ICD-10-CM

## 2021-08-31 DIAGNOSIS — R06.02 SOB (SHORTNESS OF BREATH) ON EXERTION: ICD-10-CM

## 2021-08-31 PROCEDURE — 93325 DOPPLER ECHO COLOR FLOW MAPG: CPT | Mod: TC | Performed by: INTERNAL MEDICINE

## 2021-08-31 PROCEDURE — 99207 PR STATISTIC IV PUSH SINGLE INITIAL SUBSTANCE: CPT | Performed by: INTERNAL MEDICINE

## 2021-08-31 PROCEDURE — 93325 DOPPLER ECHO COLOR FLOW MAPG: CPT | Mod: 26 | Performed by: INTERNAL MEDICINE

## 2021-08-31 PROCEDURE — 93016 CV STRESS TEST SUPVJ ONLY: CPT | Performed by: INTERNAL MEDICINE

## 2021-08-31 PROCEDURE — 93321 DOPPLER ECHO F-UP/LMTD STD: CPT | Mod: 26 | Performed by: INTERNAL MEDICINE

## 2021-08-31 PROCEDURE — 93352 ADMIN ECG CONTRAST AGENT: CPT | Performed by: INTERNAL MEDICINE

## 2021-08-31 PROCEDURE — 93018 CV STRESS TEST I&R ONLY: CPT | Performed by: INTERNAL MEDICINE

## 2021-08-31 PROCEDURE — 93321 DOPPLER ECHO F-UP/LMTD STD: CPT | Mod: TC | Performed by: INTERNAL MEDICINE

## 2021-08-31 PROCEDURE — 93350 STRESS TTE ONLY: CPT | Mod: 26 | Performed by: INTERNAL MEDICINE

## 2021-08-31 PROCEDURE — 93350 STRESS TTE ONLY: CPT | Mod: TC | Performed by: INTERNAL MEDICINE

## 2021-08-31 PROCEDURE — 93017 CV STRESS TEST TRACING ONLY: CPT | Performed by: INTERNAL MEDICINE

## 2021-08-31 RX ADMIN — Medication 7 ML: at 15:16

## 2021-09-04 ENCOUNTER — HEALTH MAINTENANCE LETTER (OUTPATIENT)
Age: 66
End: 2021-09-04

## 2021-10-30 ENCOUNTER — HEALTH MAINTENANCE LETTER (OUTPATIENT)
Age: 66
End: 2021-10-30

## 2021-11-15 ENCOUNTER — PATIENT OUTREACH (OUTPATIENT)
Dept: ONCOLOGY | Facility: CLINIC | Age: 66
End: 2021-11-15
Payer: MEDICARE

## 2021-11-15 DIAGNOSIS — C49.9 SARCOMA OF SOFT TISSUE (H): Primary | ICD-10-CM

## 2021-11-15 RX ORDER — METHYLPREDNISOLONE 32 MG/1
32 TABLET ORAL DAILY
Qty: 2 TABLET | Refills: 4 | Status: SHIPPED | OUTPATIENT
Start: 2021-11-15 | End: 2022-08-01

## 2021-11-15 NOTE — PROGRESS NOTES
Received a message from Shiela that she will need a refill of her methylprednisolone for her upcoming scan on 11/22/21.  Called her to let her know that it has been sent to her local [facility].  She stated that they are currently out and will be reordering some more.  Let her know that if they can't get anymore in prior to her scan to call back and we will send it to another pharmacy otherwise, they will cancel her scan.

## 2021-11-22 ENCOUNTER — ANCILLARY PROCEDURE (OUTPATIENT)
Dept: CT IMAGING | Facility: CLINIC | Age: 66
End: 2021-11-22
Attending: INTERNAL MEDICINE
Payer: MEDICARE

## 2021-11-22 ENCOUNTER — LAB (OUTPATIENT)
Dept: LAB | Facility: CLINIC | Age: 66
End: 2021-11-22
Attending: INTERNAL MEDICINE
Payer: MEDICARE

## 2021-11-22 VITALS
DIASTOLIC BLOOD PRESSURE: 66 MMHG | TEMPERATURE: 98.6 F | SYSTOLIC BLOOD PRESSURE: 118 MMHG | HEART RATE: 74 BPM | WEIGHT: 159 LBS | BODY MASS INDEX: 23.82 KG/M2 | RESPIRATION RATE: 16 BRPM | OXYGEN SATURATION: 98 %

## 2021-11-22 DIAGNOSIS — E03.4 HYPOTHYROIDISM DUE TO ACQUIRED ATROPHY OF THYROID: ICD-10-CM

## 2021-11-22 DIAGNOSIS — R39.89 PNEUMATURIA: ICD-10-CM

## 2021-11-22 DIAGNOSIS — R79.89 ELEVATED SERUM CREATININE: ICD-10-CM

## 2021-11-22 DIAGNOSIS — M54.50 ACUTE RIGHT-SIDED LOW BACK PAIN WITHOUT SCIATICA: ICD-10-CM

## 2021-11-22 DIAGNOSIS — Z91.041 RADIOGRAPHIC DYE ALLERGY STATUS: ICD-10-CM

## 2021-11-22 DIAGNOSIS — C49.9 SARCOMA OF SOFT TISSUE (H): ICD-10-CM

## 2021-11-22 DIAGNOSIS — D68.61 ANTIPHOSPHOLIPID ANTIBODY SYNDROME (H): ICD-10-CM

## 2021-11-22 DIAGNOSIS — R10.9 FLANK PAIN: ICD-10-CM

## 2021-11-22 DIAGNOSIS — R73.09 ELEVATED GLUCOSE: ICD-10-CM

## 2021-11-22 DIAGNOSIS — H81.11 BENIGN PAROXYSMAL POSITIONAL VERTIGO OF RIGHT EAR: ICD-10-CM

## 2021-11-22 DIAGNOSIS — Z13.220 LIPID SCREENING: ICD-10-CM

## 2021-11-22 DIAGNOSIS — R73.03 PRE-DIABETES: ICD-10-CM

## 2021-11-22 LAB
ANION GAP SERPL CALCULATED.3IONS-SCNC: 7 MMOL/L (ref 3–14)
BUN SERPL-MCNC: 24 MG/DL (ref 7–30)
CALCIUM SERPL-MCNC: 9.2 MG/DL (ref 8.5–10.1)
CHLORIDE BLD-SCNC: 107 MMOL/L (ref 94–109)
CHOLEST SERPL-MCNC: 146 MG/DL
CO2 SERPL-SCNC: 24 MMOL/L (ref 20–32)
CREAT BLD-MCNC: 1 MG/DL (ref 0.5–1)
CREAT SERPL-MCNC: 0.94 MG/DL (ref 0.52–1.04)
FASTING STATUS PATIENT QL REPORTED: YES
GFR SERPL CREATININE-BSD FRML MDRD: 59 ML/MIN/1.73M2
GFR SERPL CREATININE-BSD FRML MDRD: 64 ML/MIN/1.73M2
GLUCOSE BLD-MCNC: 131 MG/DL (ref 70–99)
HBA1C MFR BLD: 5.2 % (ref 0–5.6)
HDLC SERPL-MCNC: 87 MG/DL
LDLC SERPL CALC-MCNC: 51 MG/DL
NONHDLC SERPL-MCNC: 59 MG/DL
POTASSIUM BLD-SCNC: 3.8 MMOL/L (ref 3.4–5.3)
SODIUM SERPL-SCNC: 138 MMOL/L (ref 133–144)
TRIGL SERPL-MCNC: 40 MG/DL

## 2021-11-22 PROCEDURE — 80048 BASIC METABOLIC PNL TOTAL CA: CPT | Performed by: PATHOLOGY

## 2021-11-22 PROCEDURE — 36591 DRAW BLOOD OFF VENOUS DEVICE: CPT

## 2021-11-22 PROCEDURE — 74177 CT ABD & PELVIS W/CONTRAST: CPT | Mod: MG | Performed by: RADIOLOGY

## 2021-11-22 PROCEDURE — 83036 HEMOGLOBIN GLYCOSYLATED A1C: CPT

## 2021-11-22 PROCEDURE — 71260 CT THORAX DX C+: CPT | Mod: MG | Performed by: RADIOLOGY

## 2021-11-22 PROCEDURE — 80048 BASIC METABOLIC PNL TOTAL CA: CPT

## 2021-11-22 PROCEDURE — G1004 CDSM NDSC: HCPCS | Performed by: RADIOLOGY

## 2021-11-22 PROCEDURE — 250N000011 HC RX IP 250 OP 636: Performed by: INTERNAL MEDICINE

## 2021-11-22 PROCEDURE — 80061 LIPID PANEL: CPT

## 2021-11-22 RX ORDER — HEPARIN SODIUM (PORCINE) LOCK FLUSH IV SOLN 100 UNIT/ML 100 UNIT/ML
500 SOLUTION INTRAVENOUS EVERY 8 HOURS
Status: ACTIVE | OUTPATIENT
Start: 2021-11-22

## 2021-11-22 RX ORDER — HEPARIN SODIUM (PORCINE) LOCK FLUSH IV SOLN 100 UNIT/ML 100 UNIT/ML
5 SOLUTION INTRAVENOUS ONCE
Status: COMPLETED | OUTPATIENT
Start: 2021-11-22 | End: 2021-11-22

## 2021-11-22 RX ORDER — IOPAMIDOL 755 MG/ML
97 INJECTION, SOLUTION INTRAVASCULAR ONCE
Status: COMPLETED | OUTPATIENT
Start: 2021-11-22 | End: 2021-11-22

## 2021-11-22 RX ADMIN — IOPAMIDOL 97 ML: 755 INJECTION, SOLUTION INTRAVASCULAR at 10:01

## 2021-11-22 RX ADMIN — Medication 5 ML: at 09:50

## 2021-11-22 RX ADMIN — HEPARIN SODIUM (PORCINE) LOCK FLUSH IV SOLN 100 UNIT/ML 500 UNITS: 100 SOLUTION at 10:34

## 2021-11-22 ASSESSMENT — PAIN SCALES - GENERAL: PAINLEVEL: NO PAIN (0)

## 2021-11-22 NOTE — NURSING NOTE
Chief Complaint   Patient presents with     Port Draw     Labs drawn via port by RN in lab. VS taken.      Port accessed with 20g flat needle by RN, labs collected, line flushed with saline and heparin.  Vitals taken.     Dionne MURRIETA RN PHN BSN  BMT/Oncology Lab

## 2021-11-23 ENCOUNTER — VIRTUAL VISIT (OUTPATIENT)
Dept: ONCOLOGY | Facility: CLINIC | Age: 66
End: 2021-11-23
Attending: INTERNAL MEDICINE
Payer: MEDICARE

## 2021-11-23 DIAGNOSIS — C49.9 SARCOMA OF SOFT TISSUE (H): ICD-10-CM

## 2021-11-23 DIAGNOSIS — R79.89 ELEVATED SERUM CREATININE: ICD-10-CM

## 2021-11-23 DIAGNOSIS — E03.4 HYPOTHYROIDISM DUE TO ACQUIRED ATROPHY OF THYROID: ICD-10-CM

## 2021-11-23 DIAGNOSIS — H81.11 BENIGN PAROXYSMAL POSITIONAL VERTIGO OF RIGHT EAR: ICD-10-CM

## 2021-11-23 DIAGNOSIS — D68.61 ANTIPHOSPHOLIPID ANTIBODY SYNDROME (H): ICD-10-CM

## 2021-11-23 DIAGNOSIS — R73.09 ELEVATED GLUCOSE: ICD-10-CM

## 2021-11-23 DIAGNOSIS — Z91.041 RADIOGRAPHIC DYE ALLERGY STATUS: ICD-10-CM

## 2021-11-23 PROCEDURE — 999N001193 HC VIDEO/TELEPHONE VISIT; NO CHARGE

## 2021-11-23 PROCEDURE — G0463 HOSPITAL OUTPT CLINIC VISIT: HCPCS | Mod: PN,RTG | Performed by: INTERNAL MEDICINE

## 2021-11-23 PROCEDURE — 99214 OFFICE O/P EST MOD 30 MIN: CPT | Mod: 95 | Performed by: INTERNAL MEDICINE

## 2021-11-23 NOTE — LETTER
2021         RE: Shiela Hewitt  [address] [place]   [place] [address]        Dear Colleague,    Thank you for referring your patient, Shiela Hewitt, to the [facility]. Please see a copy of my visit note below.    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   -  Background  In brief, she noted an abdominal mass at the end of , which was quite large and was resected on 2014. This tumor was about 26 cm in greatest diameter, high-grade, with necrosis present. In retrospect, she had had a CT scan done about 2 years before that, which was felt to be negative. In 2014, repeat imaging revealed a 3.5 cm mass near the psoas, and she received preoperative radiation therapy for this. This was resected on 2014, again revealing high-grade dedifferentiated liposarcoma with positive margins. A new margin was obtained, but this was still positive for liposarcoma. All visible tumor was removed.      A recurrence was noted in the abd and she began lipodox/ifos about 3-19-15 and had 6 cycles; the last in 2015.  -  She was admitted for partial SBO earlier in 2018.  -        Interval history    She had a normal stress echo 21. She also had a normal CT angio.    She went to [place] and is walking 30-45 min/d every other day OK.  Does some wts and treadmill.    She had some hematuria in oct in [place].  She will see PCP on that.    She has some weakness in the right leg , stable, and if that does not affect her life.    Her balance is not so good sometimes but no falls.    Her sister and mom  earlier his year and shes the executor or her mom.   she sold her mom's house and will close soon.    She does have chronic diarrhea that depends on what she eats; still an issue. She has a history of gi issues; no imodium now as that can cause diarrhea; shes concerned about getting another SBO.    She had some colitis episodes in  the past.    She has occ GERD; on omeprazole.  She was off omeprazole but finds she needs it sometimes.    She is off gabapentin and her toes were bothering her and she has hot flashes at night.  She will see how it goes without it for a while.    Vertigo is OK now.  In June 2018 she had several episodes of vertigo and developed noise in her R ear requiring hospitalization.  She is not having vertigo now but has R hearing loss.     She plans to drive back to [place] in late Dec coming back in May.     We discussed removing the port.     She got both covid shots and booster.     She has a longstanding history of hypothyroidism on replacement T4, and some seasonal allergies. She was also noted to have an antiphospholipid antibody. This was checked because her sister presented with a mesenteric artery clot.         -  Background PMH, FH, SH  PAST MEDICAL HISTORY: She did have a tonsillectomy at age 15 and some sinus surgery in about 1994 along with a tubal pregnancy and a tubal ligation.   ALLERGIES: She does describe 1 hive after IV contrast material about 20 years ago, so she has been taking prednisone and Benadryl for contrast scans. She does have some itching with Dilaudid, but still uses it.   SOCIAL HISTORY: She is a never smoker and does not abuse alcohol or other drugs. 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  -          On exam she appeared comfortable with normal affect.   HEENT full EOMs  NECK no obvious goiter or mass  CHEST no resp distress  NEURO  normal mentation and speech   PSYCH Mood good.  MSK good neck movement     -  GNE OK w creat 0.94    LDL 51 HDL 87    -   I reviewed her new CT images of 11-22-21 and there is no clear recurrence.  She does have some small lung nodules stable since 2016.    Formal read:  IMPRESSION:  1.  No recurrent or metastatic disease in the chest, abdomen and  pelvis.  2.  Few small pulmonary nodules measuring up to 5 mm  are stable.    -  A/P   We discussed a number of issues.      1-sarcoma. abd dedifferentiated liposarcomq.  CHEIKH now.  She is >6 years out from last chemotherapy (August 2015).     We will restage about 6-10 and see her 6-14.     Contrast allergy requires methylpred; She has steroid for the next scan and takes that.     1a-hematuria  Will see PCP on that       2-Port issue-has poor veins so she will try to get it removed.     3-elevated FBS  Creat is fairly stable.  She will see PCP shortly.      4-neuropathy  She will use gabapentin prn     5-GERD  She will use prilosec prn now     6-Diarrhea  Stable, sees PCP     7-hypothyroidisim, hearing loss  Stable f/u w PCP     All questions were addressed and she will call if other questions arise.      Rob Wiseman M.D.  Professor  Hematology, Oncology and Transplantation

## 2021-11-23 NOTE — PROGRESS NOTES
Shiela is a 66 year old who is being evaluated via a billable video visit.      How would you like to obtain your AVS? MyChart  If the video visit is dropped, the invitation should be resent by: Text to cell phone: [phone]   Will anyone else be joining your video visit? Yes: Андрей - . How would they like to receive their invitation? Text to cell phone: in person      Video Start [id number]  Video-Visit Details    Type of service:  Video Visit    Video End [id number]    Originating Location (pt.home    Distant Location (provider location):  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   -  Background  In brief, she noted an abdominal mass at the end of 2013, which was quite large and was resected on 01/28/2014. This tumor was about 26 cm in greatest diameter, high-grade, with necrosis present. In retrospect, she had had a CT scan done about 2 years before that, which was felt to be negative. In 08/2014, repeat imaging revealed a 3.5 cm mass near the psoas, and she received preoperative radiation therapy for this. 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 A new margin was obtained, but this was still positive for liposarcoma. All visible tumor was removed.      A recurrence was noted in the abd and she began lipodox/ifos about 3-19-15 and had 6 cycles; the last in August 2015.  -  She was admitted for partial SBO earlier in 2018.  -        Interval history    She had a normal stress echo 8-31-21. She also had a normal CT angio.    She went to [place] and is walking 30-45 min/d every other day OK.  Does some wts and treadmill.    She had some hematuria in oct in [place].  She will see PCP on that.    She has some weakness in the right leg , stable, and if that does not affect her life.    Her balance is not so good  sometimes but no falls.    Her sister and mom  earlier his year and shes the executor or her mom.   she sold her mom's house and will close soon.    She does have chronic diarrhea that depends on what she eats; still an issue. She has a history of gi issues; no imodium now as that can cause diarrhea; shes concerned about getting another SBO.    She had some colitis episodes in the past.    She has occ GERD; on omeprazole.  She was off omeprazole but finds she needs it sometimes.    She is off gabapentin and her toes were bothering her and she has hot flashes at night.  She will see how it goes without it for a while.    Vertigo is OK now.  In 2018 she had several episodes of vertigo and developed noise in her R ear requiring hospitalization.  She is not having vertigo now but has R hearing loss.     She plans to drive back to [place] in late Dec coming back in May.     We discussed removing the port.     She got both covid shots and booster.     She has a longstanding history of hypothyroidism on replacement T4, and some seasonal allergies. She was also noted to have an antiphospholipid antibody. This was checked because her sister presented with a mesenteric artery clot.         -  Background PMH, FH, SH  PAST MEDICAL HISTORY: She did have a tonsillectomy at age 15 and some sinus surgery in about  along with a tubal pregnancy and a tubal ligation.   ALLERGIES: She does describe 1 hive after IV contrast material about 20 years ago, so she has been taking prednisone and Benadryl for contrast scans. She does have some itching with Dilaudid, but still uses it.   SOCIAL HISTORY: She is a never smoker and does not abuse alcohol or other drugs. 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  -          On exam she appeared comfortable with normal affect.   HEENT full EOMs  NECK no obvious goiter or mass  CHEST no resp distress  NEURO  normal mentation and  speech   PSYCH Mood good.  MSK good neck movement     -  GNE OK w creat 0.94    LDL 51 HDL 87    -   I reviewed her new CT images of 11-22-21 and there is no clear recurrence.  She does have some small lung nodules stable since 2016.    Formal read:  IMPRESSION:  1.  No recurrent or metastatic disease in the chest, abdomen and  pelvis.  2.  Few small pulmonary nodules measuring up to 5 mm are stable.    -  A/P   We discussed a number of issues.      1-sarcoma. abd dedifferentiated liposarcomq.  CHEIKH now.  She is >6 years out from last chemotherapy (August 2015).     We will restage about 6-10 and see her 6-14.     Contrast allergy requires methylpred; She has steroid for the next scan and takes that.     1a-hematuria  Will see PCP on that       2-Port issue-has poor veins so she will try to get it removed.     3-elevated FBS  Creat is fairly stable.  She will see PCP shortly.      4-neuropathy  She will use gabapentin prn     5-GERD  She will use prilosec prn now     6-Diarrhea  Stable, sees PCP     7-hypothyroidisim, hearing loss  Stable f/u w PCP     All questions were addressed and she will call if other questions arise.      Rob Wiseman M.D.  Professor  Hematology, Oncology and Transplantation

## 2021-12-08 ENCOUNTER — ANCILLARY PROCEDURE (OUTPATIENT)
Dept: BONE DENSITY | Facility: CLINIC | Age: 66
End: 2021-12-08
Attending: FAMILY MEDICINE
Payer: MEDICARE

## 2021-12-08 DIAGNOSIS — Z13.820 ENCOUNTER FOR OSTEOPOROSIS SCREENING IN ASYMPTOMATIC POSTMENOPAUSAL PATIENT: ICD-10-CM

## 2021-12-08 DIAGNOSIS — Z78.0 ENCOUNTER FOR OSTEOPOROSIS SCREENING IN ASYMPTOMATIC POSTMENOPAUSAL PATIENT: ICD-10-CM

## 2021-12-08 PROCEDURE — 77080 DXA BONE DENSITY AXIAL: CPT | Performed by: INTERNAL MEDICINE

## 2021-12-23 ENCOUNTER — LAB (OUTPATIENT)
Dept: LAB | Facility: CLINIC | Age: 66
End: 2021-12-23
Attending: INTERNAL MEDICINE
Payer: MEDICARE

## 2021-12-23 PROCEDURE — 250N000011 HC RX IP 250 OP 636: Performed by: INTERNAL MEDICINE

## 2021-12-23 PROCEDURE — 96523 IRRIG DRUG DELIVERY DEVICE: CPT

## 2021-12-23 RX ORDER — HEPARIN SODIUM (PORCINE) LOCK FLUSH IV SOLN 100 UNIT/ML 100 UNIT/ML
5 SOLUTION INTRAVENOUS ONCE
Status: COMPLETED | OUTPATIENT
Start: 2021-12-23 | End: 2021-12-23

## 2021-12-23 RX ADMIN — Medication 5 ML: at 10:48

## 2021-12-23 NOTE — NURSING NOTE
Chief Complaint   Patient presents with     Port Flush     Port flushed in lab by RN in lab.     Port accessed with 20g gripper needle by RN, labs collected, line flushed with saline and heparin.  Port deaccessed.   Geovanny Sheikh RN

## 2022-04-16 ENCOUNTER — HEALTH MAINTENANCE LETTER (OUTPATIENT)
Age: 67
End: 2022-04-16

## 2022-05-23 DIAGNOSIS — E03.9 HYPOTHYROIDISM, UNSPECIFIED TYPE: ICD-10-CM

## 2022-05-23 NOTE — TELEPHONE ENCOUNTER
Routing refill request to provider for review/approval because:  Labs not current:    TSH   Date Value Ref Range Status   05/21/2021 0.92 0.40 - 4.00 mU/L Final     Misa Hackett RN, BSN, PHN  [facility]: [place]

## 2022-05-24 RX ORDER — LEVOTHYROXINE SODIUM 75 UG/1
75 TABLET ORAL DAILY
Qty: 30 TABLET | Refills: 0 | Status: SHIPPED | OUTPATIENT
Start: 2022-05-24 | End: 2022-06-28

## 2022-06-07 ENCOUNTER — MYC MEDICAL ADVICE (OUTPATIENT)
Dept: FAMILY MEDICINE | Facility: CLINIC | Age: 67
End: 2022-06-07
Payer: MEDICARE

## 2022-06-07 DIAGNOSIS — Z13.220 LIPID SCREENING: ICD-10-CM

## 2022-06-07 DIAGNOSIS — E03.4 HYPOTHYROIDISM DUE TO ACQUIRED ATROPHY OF THYROID: Primary | ICD-10-CM

## 2022-06-07 DIAGNOSIS — E11.9 TYPE 2 DIABETES MELLITUS WITHOUT COMPLICATION, WITHOUT LONG-TERM CURRENT USE OF INSULIN (H): ICD-10-CM

## 2022-06-07 DIAGNOSIS — Z11.4 SCREENING FOR HIV (HUMAN IMMUNODEFICIENCY VIRUS): ICD-10-CM

## 2022-06-10 ENCOUNTER — LAB (OUTPATIENT)
Dept: LAB | Facility: CLINIC | Age: 67
End: 2022-06-10
Attending: INTERNAL MEDICINE
Payer: MEDICARE

## 2022-06-10 ENCOUNTER — ANCILLARY PROCEDURE (OUTPATIENT)
Dept: CT IMAGING | Facility: CLINIC | Age: 67
End: 2022-06-10
Attending: INTERNAL MEDICINE
Payer: MEDICARE

## 2022-06-10 DIAGNOSIS — D68.61 ANTIPHOSPHOLIPID ANTIBODY SYNDROME (H): ICD-10-CM

## 2022-06-10 DIAGNOSIS — Z91.041 RADIOGRAPHIC DYE ALLERGY STATUS: ICD-10-CM

## 2022-06-10 DIAGNOSIS — Z95.828 PORT-A-CATH IN PLACE: Primary | ICD-10-CM

## 2022-06-10 DIAGNOSIS — H81.11 BENIGN PAROXYSMAL POSITIONAL VERTIGO OF RIGHT EAR: ICD-10-CM

## 2022-06-10 DIAGNOSIS — R79.89 ELEVATED SERUM CREATININE: ICD-10-CM

## 2022-06-10 DIAGNOSIS — E03.4 HYPOTHYROIDISM DUE TO ACQUIRED ATROPHY OF THYROID: ICD-10-CM

## 2022-06-10 DIAGNOSIS — C49.9 SARCOMA OF SOFT TISSUE (H): ICD-10-CM

## 2022-06-10 DIAGNOSIS — R73.09 ELEVATED GLUCOSE: ICD-10-CM

## 2022-06-10 LAB
ALBUMIN SERPL-MCNC: 4 G/DL (ref 3.4–5)
ALP SERPL-CCNC: 73 U/L (ref 40–150)
ALT SERPL W P-5'-P-CCNC: 30 U/L (ref 0–50)
ANION GAP SERPL CALCULATED.3IONS-SCNC: 7 MMOL/L (ref 3–14)
AST SERPL W P-5'-P-CCNC: 21 U/L (ref 0–45)
BASOPHILS # BLD MANUAL: 0 10E3/UL (ref 0–0.2)
BASOPHILS NFR BLD MANUAL: 0 %
BILIRUB SERPL-MCNC: 0.6 MG/DL (ref 0.2–1.3)
BUN SERPL-MCNC: 21 MG/DL (ref 7–30)
CALCIUM SERPL-MCNC: 9.4 MG/DL (ref 8.5–10.1)
CHLORIDE BLD-SCNC: 108 MMOL/L (ref 94–109)
CHOLEST SERPL-MCNC: 143 MG/DL
CO2 SERPL-SCNC: 24 MMOL/L (ref 20–32)
CREAT BLD-MCNC: 0.9 MG/DL (ref 0.5–1)
CREAT SERPL-MCNC: 0.94 MG/DL (ref 0.52–1.04)
EOSINOPHIL # BLD MANUAL: 0 10E3/UL (ref 0–0.7)
EOSINOPHIL NFR BLD MANUAL: 0 %
ERYTHROCYTE [DISTWIDTH] IN BLOOD BY AUTOMATED COUNT: 12.9 % (ref 10–15)
FASTING STATUS PATIENT QL REPORTED: YES
GFR SERPL CREATININE-BSD FRML MDRD: 67 ML/MIN/1.73M2
GFR SERPL CREATININE-BSD FRML MDRD: >60 ML/MIN/1.73M2
GLUCOSE BLD-MCNC: 135 MG/DL (ref 70–99)
HBA1C MFR BLD: 5.1 % (ref 0–5.6)
HCT VFR BLD AUTO: 39.9 % (ref 35–47)
HDLC SERPL-MCNC: 88 MG/DL
HGB BLD-MCNC: 13.5 G/DL (ref 11.7–15.7)
HIV 1+2 AB+HIV1 P24 AG SERPL QL IA: NONREACTIVE
LDLC SERPL CALC-MCNC: 47 MG/DL
LYMPHOCYTES # BLD MANUAL: 0.3 10E3/UL (ref 0.8–5.3)
LYMPHOCYTES NFR BLD MANUAL: 4 %
MCH RBC QN AUTO: 33.4 PG (ref 26.5–33)
MCHC RBC AUTO-ENTMCNC: 33.8 G/DL (ref 31.5–36.5)
MCV RBC AUTO: 99 FL (ref 78–100)
MONOCYTES # BLD MANUAL: 0.1 10E3/UL (ref 0–1.3)
MONOCYTES NFR BLD MANUAL: 1 %
NEUTROPHILS # BLD MANUAL: 7.7 10E3/UL (ref 1.6–8.3)
NEUTROPHILS NFR BLD MANUAL: 95 %
NONHDLC SERPL-MCNC: 55 MG/DL
PLAT MORPH BLD: ABNORMAL
PLATELET # BLD AUTO: 200 10E3/UL (ref 150–450)
POTASSIUM BLD-SCNC: 3.9 MMOL/L (ref 3.4–5.3)
PROT SERPL-MCNC: 7.4 G/DL (ref 6.8–8.8)
RBC # BLD AUTO: 4.04 10E6/UL (ref 3.8–5.2)
RBC MORPH BLD: ABNORMAL
SODIUM SERPL-SCNC: 139 MMOL/L (ref 133–144)
TRIGL SERPL-MCNC: 39 MG/DL
TSH SERPL DL<=0.005 MIU/L-ACNC: 0.69 MU/L (ref 0.4–4)
WBC # BLD AUTO: 8.1 10E3/UL (ref 4–11)

## 2022-06-10 PROCEDURE — 71260 CT THORAX DX C+: CPT | Mod: MG | Performed by: RADIOLOGY

## 2022-06-10 PROCEDURE — 87389 HIV-1 AG W/HIV-1&-2 AB AG IA: CPT | Performed by: FAMILY MEDICINE

## 2022-06-10 PROCEDURE — 84443 ASSAY THYROID STIM HORMONE: CPT | Mod: GT | Performed by: INTERNAL MEDICINE

## 2022-06-10 PROCEDURE — G1004 CDSM NDSC: HCPCS | Performed by: RADIOLOGY

## 2022-06-10 PROCEDURE — 85007 BL SMEAR W/DIFF WBC COUNT: CPT | Mod: GT | Performed by: INTERNAL MEDICINE

## 2022-06-10 PROCEDURE — 83036 HEMOGLOBIN GLYCOSYLATED A1C: CPT | Mod: GT | Performed by: INTERNAL MEDICINE

## 2022-06-10 PROCEDURE — 250N000011 HC RX IP 250 OP 636: Performed by: INTERNAL MEDICINE

## 2022-06-10 PROCEDURE — 36591 DRAW BLOOD OFF VENOUS DEVICE: CPT | Mod: GT | Performed by: INTERNAL MEDICINE

## 2022-06-10 PROCEDURE — 80053 COMPREHEN METABOLIC PANEL: CPT | Mod: GT | Performed by: INTERNAL MEDICINE

## 2022-06-10 PROCEDURE — 85027 COMPLETE CBC AUTOMATED: CPT | Mod: GT | Performed by: INTERNAL MEDICINE

## 2022-06-10 PROCEDURE — 80061 LIPID PANEL: CPT | Mod: GT | Performed by: INTERNAL MEDICINE

## 2022-06-10 PROCEDURE — 74177 CT ABD & PELVIS W/CONTRAST: CPT | Mod: MG | Performed by: RADIOLOGY

## 2022-06-10 RX ORDER — HEPARIN SODIUM (PORCINE) LOCK FLUSH IV SOLN 100 UNIT/ML 100 UNIT/ML
5 SOLUTION INTRAVENOUS ONCE
Status: DISCONTINUED | OUTPATIENT
Start: 2022-06-10 | End: 2022-06-14 | Stop reason: HOSPADM

## 2022-06-10 RX ORDER — HEPARIN SODIUM (PORCINE) LOCK FLUSH IV SOLN 100 UNIT/ML 100 UNIT/ML
5 SOLUTION INTRAVENOUS ONCE
Status: COMPLETED | OUTPATIENT
Start: 2022-06-10 | End: 2022-06-10

## 2022-06-10 RX ORDER — IOPAMIDOL 755 MG/ML
96 INJECTION, SOLUTION INTRAVASCULAR ONCE
Status: COMPLETED | OUTPATIENT
Start: 2022-06-10 | End: 2022-06-10

## 2022-06-10 RX ORDER — HEPARIN SODIUM (PORCINE) LOCK FLUSH IV SOLN 100 UNIT/ML 100 UNIT/ML
500 SOLUTION INTRAVENOUS ONCE
Status: COMPLETED | OUTPATIENT
Start: 2022-06-10 | End: 2022-06-10

## 2022-06-10 RX ADMIN — IOPAMIDOL 96 ML: 755 INJECTION, SOLUTION INTRAVASCULAR at 10:30

## 2022-06-10 RX ADMIN — HEPARIN SODIUM (PORCINE) LOCK FLUSH IV SOLN 100 UNIT/ML 500 UNITS: 100 SOLUTION at 10:40

## 2022-06-10 RX ADMIN — Medication 5 ML: at 10:02

## 2022-06-10 NOTE — NURSING NOTE
Chief Complaint   Patient presents with     Port Draw     Labs drawn via port by RN in lab.      Port accessed with 20g flat needle by RN, labs collected, line flushed with saline and heparin.  Vitals taken. Pt checked in for appointment(s).    Dionne MURRIETA RN PHN BSN  BMT/Oncology Lab

## 2022-06-13 NOTE — PROGRESS NOTES
Shiela is a 66 year old who is being evaluated via a billable video visit.      How would you like to obtain your AVS? Frogramshart  If the video visit is dropped, the invitation should be resent by: Text to cell phone: [phone]  Will anyone else be joining your video visit? No      Video Start Time:abouy 1020  Video-Visit Details    Type of service:  Video Visit    Video End Time:1042    Originating Location (pt. 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   -  Background  In brief, she noted an abdominal mass at the end of 2013, which was quite large and was resected on 01/28/2014. This tumor was about 26 cm in greatest diameter, high-grade, with necrosis present. In retrospect, she had had a CT scan done about 2 years before that, which was felt to be negative. In 08/2014, repeat imaging revealed a 3.5 cm mass near the psoas, and she received preoperative radiation therapy for this. 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 A new margin was obtained, but this was still positive for liposarcoma. All visible tumor was removed.      A recurrence was noted in the abd and she began lipodox/ifos about 3-19-15 and had 6 cycles; the last in August 2015.  -  She was admitted for partial SBO earlier in 2018.  -  She had a normal stress echo 8-31-21. She also had a normal CT angio.  -       Interval history    Came back 5-20.  She went to [place] and is walking and doing some wts and treadmill.  She did fall while walking the dog (triped over the dog).     She had some hematuria in oct in [place] and this happened twice.  She saw her PCP on that and sees them in August also.     She has some weakness in the right leg , stable, and if that does not affect  her life.     She does have chronic diarrhea that depends on what she eats; still an issue. She has a history of gi issues; no imodium now but might consider trying it again; shes concerned about getting another SBO.    She had some colitis episodes in the past.     She has occ GERD; on omeprazole about 2/week.      She is back on gabapentin as her toes burning were bothering her and she has hot flashes at night.       Vertigo is OK now.  In June 2018 she had several episodes of vertigo and developed noise in her R ear requiring hospitalization.  She is not having vertigo now but has R hearing loss and now has hearing aids and that's working.     She plans to drive back to [place] in Oct-Nov then back for a month coming back in May.     We discussed removing the port-still has it.     She got both covid shots and booster.    She has a longstanding history of hypothyroidism on replacement T4, and some seasonal allergies. She was also noted to have an antiphospholipid antibody. This was checked because her sister presented with a mesenteric artery clot.         -  Background PMH, FH, SH  PAST MEDICAL HISTORY: She did have a tonsillectomy at age 15 and some sinus surgery in about 1994 along with a tubal pregnancy and a tubal ligation.   ALLERGIES: She does describe 1 hive after IV contrast material about 20 years ago, so she has been taking prednisone and Benadryl for contrast scans. She does have some itching with Dilaudid, but still uses it.   SOCIAL HISTORY: She is a never smoker and does not abuse alcohol or other drugs. 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  -          On exam she appeared comfortable with normal affect.   HEENT full EOMs  NECK no obvious goiter or mass  CHEST no resp distress  NEURO  normal mentation and speech   PSYCH Mood good.  MSK good neck movement     -  CBC, LFT, GNE OK w creat 0.94,   glu 135; Hb A1c 5.1  TSH 0.69  LDL 47, HDL  88     HIV Ag/Arnel combo neg-she will check w PCP on why that was ordered    -   I reviewed her new CT images of 6-10-22 and there is no clear recurrence.  She does have some small lung nodules stable since 2016.    Formal read:  CT-CAP 6-10-22  IMPRESSION:  1.  Stable exam without evidence for new disease.  2.  Stable small pulmonary nodules.     -  A/P   We discussed a number of issues.      1-sarcoma. abd dedifferentiated liposarcomq.  CHEIKH now.      She is almost 7 years out from last chemotherapy (August 2015).       We will restage about 12-2 and see her 12-6     Contrast allergy requires methylpred; She has steroid Rx for the next scan and takes that.     1a-intermittent hematuria  Will f/u w PCP on that  Could reflect past XRT but could warrant urology eval     2-Port issue-will arrange removal     3-elevated FBS in past  OK now  Creat is fairly stable.  She will f/u w PCP.      4-neuropathy  She will use gabapentin as needed     5-GERD  She will use prilosec prn now     6-Diarrhea  Stable, f/u w PCP     7-hypothyroidisim, hearing loss  Stable f/u w PCP     All questions were addressed and she will call if other questions arise.      Rob Wiseman M.D.  Professor  Hematology, Oncology and Transplantation

## 2022-06-14 ENCOUNTER — VIRTUAL VISIT (OUTPATIENT)
Dept: ONCOLOGY | Facility: CLINIC | Age: 67
End: 2022-06-14
Attending: INTERNAL MEDICINE
Payer: MEDICARE

## 2022-06-14 DIAGNOSIS — E11.9 TYPE 2 DIABETES MELLITUS WITHOUT COMPLICATION, WITHOUT LONG-TERM CURRENT USE OF INSULIN (H): ICD-10-CM

## 2022-06-14 DIAGNOSIS — C49.9 SARCOMA OF SOFT TISSUE (H): ICD-10-CM

## 2022-06-14 DIAGNOSIS — Z13.220 LIPID SCREENING: ICD-10-CM

## 2022-06-14 DIAGNOSIS — R73.09 ELEVATED GLUCOSE: ICD-10-CM

## 2022-06-14 DIAGNOSIS — D68.61 ANTIPHOSPHOLIPID ANTIBODY SYNDROME (H): ICD-10-CM

## 2022-06-14 DIAGNOSIS — Z91.041 RADIOGRAPHIC DYE ALLERGY STATUS: ICD-10-CM

## 2022-06-14 DIAGNOSIS — Z91.041 CONTRAST MEDIA ALLERGY: Primary | ICD-10-CM

## 2022-06-14 DIAGNOSIS — E03.4 HYPOTHYROIDISM DUE TO ACQUIRED ATROPHY OF THYROID: ICD-10-CM

## 2022-06-14 DIAGNOSIS — Z11.4 SCREENING FOR HIV (HUMAN IMMUNODEFICIENCY VIRUS): ICD-10-CM

## 2022-06-14 DIAGNOSIS — R79.89 ELEVATED SERUM CREATININE: ICD-10-CM

## 2022-06-14 PROCEDURE — G0463 HOSPITAL OUTPT CLINIC VISIT: HCPCS | Mod: PN,RTG | Performed by: INTERNAL MEDICINE

## 2022-06-14 PROCEDURE — 99214 OFFICE O/P EST MOD 30 MIN: CPT | Mod: 95 | Performed by: INTERNAL MEDICINE

## 2022-06-14 NOTE — LETTER
6/14/2022         RE: Shiela Hewitt  [address]   [place] [address]        Dear Colleague,    Thank you for referring your patient, Shiela Hewitt, to the [facility]. Please see a copy of my visit note below.      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   -  Background  In brief, she noted an abdominal mass at the end of 2013, which was quite large and was resected on 01/28/2014. This tumor was about 26 cm in greatest diameter, high-grade, with necrosis present. In retrospect, she had had a CT scan done about 2 years before that, which was felt to be negative. In 08/2014, repeat imaging revealed a 3.5 cm mass near the psoas, and she received preoperative radiation therapy for this. 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 A new margin was obtained, but this was still positive for liposarcoma. All visible tumor was removed.      A recurrence was noted in the abd and she began lipodox/ifos about 3-19-15 and had 6 cycles; the last in August 2015.  -  She was admitted for partial SBO earlier in 2018.  -  She had a normal stress echo 8-31-21. She also had a normal CT angio.  -       Interval history    Came back 5-20.  She went to [place] and is walking and doing some wts and treadmill.  She did fall while walking the dog (triped over the dog).     She had some hematuria in oct in [place] and this happened twice.  She saw her PCP on that and sees them in August also.     She has some weakness in the right leg , stable, and if that does not affect her life.     She does have chronic diarrhea that depends on what she eats; still an issue. She has a history of gi issues; no imodium now but might consider trying it again; shes concerned about getting another SBO.    She had some colitis episodes in the past.     She has occ GERD; on omeprazole about 2/week.      She  is back on gabapentin as her toes burning were bothering her and she has hot flashes at night.       Vertigo is OK now.  In June 2018 she had several episodes of vertigo and developed noise in her R ear requiring hospitalization.  She is not having vertigo now but has R hearing loss and now has hearing aids and that's working.     She plans to drive back to [place] in Oct-Nov then back for a month coming back in May.     We discussed removing the port-still has it.     She got both covid shots and booster.    She has a longstanding history of hypothyroidism on replacement T4, and some seasonal allergies. She was also noted to have an antiphospholipid antibody. This was checked because her sister presented with a mesenteric artery clot.         -  Background PMH, FH, SH  PAST MEDICAL HISTORY: She did have a tonsillectomy at age 15 and some sinus surgery in about 1994 along with a tubal pregnancy and a tubal ligation.   ALLERGIES: She does describe 1 hive after IV contrast material about 20 years ago, so she has been taking prednisone and Benadryl for contrast scans. She does have some itching with Dilaudid, but still uses it.   SOCIAL HISTORY: She is a never smoker and does not abuse alcohol or other drugs. 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  -          On exam she appeared comfortable with normal affect.   HEENT full EOMs  NECK no obvious goiter or mass  CHEST no resp distress  NEURO  normal mentation and speech   PSYCH Mood good.  MSK good neck movement     -  CBC, LFT, GNE OK w creat 0.94,   glu 135; Hb A1c 5.1  TSH 0.69  LDL 47, HDL 88     HIV Ag/Arnel combo neg-she will check w PCP on why that was ordered    -   I reviewed her new CT images of 6-10-22 and there is no clear recurrence.  She does have some small lung nodules stable since 2016.    Formal read:  CT-CAP 6-10-22  IMPRESSION:  1.  Stable exam without evidence for new disease.  2.  Stable  small pulmonary nodules.     -  A/P   We discussed a number of issues.      1-sarcoma. abd dedifferentiated liposarcomq.  CHEIKH now.      She is almost 7 years out from last chemotherapy (August 2015).       We will restage about 12-2 and see her 12-6     Contrast allergy requires methylpred; She has steroid Rx for the next scan and takes that.     1a-intermittent hematuria  Will f/u w PCP on that  Could reflect past XRT but could warrant urology eval     2-Port issue-will arrange removal     3-elevated FBS in past  OK now  Creat is fairly stable.  She will f/u w PCP.      4-neuropathy  She will use gabapentin as needed     5-GERD  She will use prilosec prn now     6-Diarrhea  Stable, f/u w PCP     7-hypothyroidisim, hearing loss  Stable f/u w PCP     All questions were addressed and she will call if other questions arise.        Rob Wiseman M.D.  Professor  Hematology, Oncology and Transplantation

## 2022-06-14 NOTE — NURSING NOTE
Patients states there has not been any new changes with medications. She did not go through the list of medications on file.     Lihsa Leal

## 2022-06-22 ENCOUNTER — MYC MEDICAL ADVICE (OUTPATIENT)
Dept: INTERVENTIONAL RADIOLOGY/VASCULAR | Facility: CLINIC | Age: 67
End: 2022-06-22

## 2022-06-27 DIAGNOSIS — E03.9 HYPOTHYROIDISM, UNSPECIFIED TYPE: ICD-10-CM

## 2022-06-27 NOTE — TELEPHONE ENCOUNTER
Routing to PCP        Patient has appointment scheduled for 8/1/22.  Ok for refill till that time?      Pending Prescriptions:                       Disp   Refills    EUTHYROX 75 MCG tablet [Pharmacy Med Name:*30 tab*0        Sig: TAKE 1 TABLET BY MOUTH ONCE DAILY . APPOINTMENT           REQUIRED FOR FUTURE REFILLS      Arleth Gallardo RN

## 2022-06-28 RX ORDER — LEVOTHYROXINE SODIUM 75 UG/1
75 TABLET ORAL DAILY
Qty: 90 TABLET | Refills: 3 | Status: SHIPPED | OUTPATIENT
Start: 2022-06-28 | End: 2023-06-23

## 2022-06-29 ENCOUNTER — HOSPITAL ENCOUNTER (OUTPATIENT)
Facility: CLINIC | Age: 67
Discharge: HOME | End: 2022-06-29
Attending: INTERNAL MEDICINE | Admitting: PHYSICIAN ASSISTANT
Payer: MEDICARE

## 2022-06-29 ENCOUNTER — APPOINTMENT (OUTPATIENT)
Dept: INTERVENTIONAL RADIOLOGY/VASCULAR | Facility: CLINIC | Age: 67
End: 2022-06-29
Attending: INTERNAL MEDICINE
Payer: MEDICARE

## 2022-06-29 ENCOUNTER — APPOINTMENT (OUTPATIENT)
Dept: MEDSURG UNIT | Facility: CLINIC | Age: 67
End: 2022-06-29
Attending: INTERNAL MEDICINE
Payer: MEDICARE

## 2022-06-29 VITALS
TEMPERATURE: 97.8 F | OXYGEN SATURATION: 100 % | HEART RATE: 58 BPM | WEIGHT: 161 LBS | DIASTOLIC BLOOD PRESSURE: 73 MMHG | RESPIRATION RATE: 16 BRPM | SYSTOLIC BLOOD PRESSURE: 125 MMHG | BODY MASS INDEX: 24.12 KG/M2

## 2022-06-29 DIAGNOSIS — Z13.220 LIPID SCREENING: ICD-10-CM

## 2022-06-29 DIAGNOSIS — E11.9 TYPE 2 DIABETES MELLITUS WITHOUT COMPLICATION, WITHOUT LONG-TERM CURRENT USE OF INSULIN (H): ICD-10-CM

## 2022-06-29 DIAGNOSIS — R73.09 ELEVATED GLUCOSE: ICD-10-CM

## 2022-06-29 DIAGNOSIS — Z11.4 SCREENING FOR HIV (HUMAN IMMUNODEFICIENCY VIRUS): ICD-10-CM

## 2022-06-29 DIAGNOSIS — C49.9 SARCOMA OF SOFT TISSUE (H): ICD-10-CM

## 2022-06-29 DIAGNOSIS — E03.4 HYPOTHYROIDISM DUE TO ACQUIRED ATROPHY OF THYROID: ICD-10-CM

## 2022-06-29 DIAGNOSIS — Z91.041 CONTRAST MEDIA ALLERGY: ICD-10-CM

## 2022-06-29 DIAGNOSIS — R79.89 ELEVATED SERUM CREATININE: ICD-10-CM

## 2022-06-29 DIAGNOSIS — D68.61 ANTIPHOSPHOLIPID ANTIBODY SYNDROME (H): ICD-10-CM

## 2022-06-29 DIAGNOSIS — Z91.041 RADIOGRAPHIC DYE ALLERGY STATUS: ICD-10-CM

## 2022-06-29 LAB — INR PPP: 0.91 (ref 0.85–1.15)

## 2022-06-29 PROCEDURE — 36590 REMOVAL TUNNELED CV CATH: CPT

## 2022-06-29 PROCEDURE — 85610 PROTHROMBIN TIME: CPT | Performed by: RADIOLOGY

## 2022-06-29 PROCEDURE — 999N000142 HC STATISTIC PROCEDURE PREP ONLY

## 2022-06-29 PROCEDURE — 999N000132 HC STATISTIC PP CARE STAGE 1

## 2022-06-29 PROCEDURE — 250N000009 HC RX 250: Performed by: PHYSICIAN ASSISTANT

## 2022-06-29 PROCEDURE — 36590 REMOVAL TUNNELED CV CATH: CPT | Performed by: PHYSICIAN ASSISTANT

## 2022-06-29 PROCEDURE — 272N000602 HC WOUND GLUE CR1

## 2022-06-29 PROCEDURE — 36415 COLL VENOUS BLD VENIPUNCTURE: CPT | Performed by: RADIOLOGY

## 2022-06-29 RX ORDER — SODIUM CHLORIDE 9 MG/ML
INJECTION, SOLUTION INTRAVENOUS CONTINUOUS
Status: DISCONTINUED | OUTPATIENT
Start: 2022-06-29 | End: 2022-06-29 | Stop reason: HOSPADM

## 2022-06-29 RX ORDER — LIDOCAINE 40 MG/G
CREAM TOPICAL
Status: DISCONTINUED | OUTPATIENT
Start: 2022-06-29 | End: 2022-06-29 | Stop reason: HOSPADM

## 2022-06-29 RX ORDER — LIDOCAINE HYDROCHLORIDE 10 MG/ML
1-30 INJECTION, SOLUTION EPIDURAL; INFILTRATION; INTRACAUDAL; PERINEURAL
Status: COMPLETED | OUTPATIENT
Start: 2022-06-29 | End: 2022-06-29

## 2022-06-29 RX ADMIN — LIDOCAINE HYDROCHLORIDE 6 ML: 10 INJECTION, SOLUTION EPIDURAL; INFILTRATION; INTRACAUDAL; PERINEURAL at 10:15

## 2022-06-29 NOTE — PROGRESS NOTES
Pt arrived on 2a post port removal. No sedation given. VSS RA. Site c/d/i. No pain. Pt eating and drinking. Family at bedside. 1050 Pt tolerated oral intake. Discharge instructions reviewed, copy given to pt. NIURKA cruz'd. Pt discharged home accompanied by .

## 2022-06-29 NOTE — DISCHARGE INSTRUCTIONS
[facility]     Interventional Radiology  Discharge Instructions Following  Port Removal    Your site(s) has been closed with:  Absorbable suture  If after 10 days there are visible suture they may be trimmed by your primary doctor  Skin Glue (Derma Hutchinson)   Do not apply any ointments over site  This thin layer will slough off in 7-10 days  May get wet in 24 hours, do not rub for 7 days  And Dressed with:   Nothing    If there is any oozing or bleeding from the site, apply direct pressure for 5-10 minutes with a gauze pad.  If bleeding continues after 10 minutes call your primary doctor.  If bleeding cannot be controlled with direct pressure, call 911.     Do not lift more than 10 lbs. for 3 days.  Call your Doctor if:  Bleeding or increased bruising at any exit or incision site  Swelling in your neck or arm  Fever >100.5 F  Other signs of infection such as, redness, tenderness, or drainage from the wound    ADDITIONAL INSTRUCTIONS : May use ice pack 3-4 times a day for 15 minutes for minor swelling or pain.          [facility] INTERVENTIONAL RADIOLOGY DEPARTMENT  Procedure Physician: Alo SANDOVAL              Date of Procedure: June 29, 2022  Telephone Numbers:  [phone] Monday-Friday 8:00 am to 4:30 pm Hospital Switchboard [phone] After 4:30 pm Monday-Friday, Weekends & Holidays.   Ask for the Interventional Radiologist on call.  Someone is on call 24 hrs/day  [facility] toll free number: [phone] Monday-Friday 8:00 am to 4:30 pm  [facility] Emergency Dept: [phone]

## 2022-06-29 NOTE — IR NOTE
Patient Name: Shiela Hewitt  Medical Record Number: [id number]  Today's Date: 6/29/2022    Procedure: Port removal  Proceduralist: Alo SANDOVAL  Pathology present: na    Procedure Start: 1000  Procedure end: 1015  Sedation medications administered: local lidocaine 1%.      Report given to: Nimisha LASSITER   : estrella    Other Notes: Pt arrived to IR room 2 from . Consent reviewed. Pt denies any questions or concerns regarding procedure. Pt positioned supine and monitored per protocol. Pt tolerated procedure without any noted complications. Pt transferred back to .

## 2022-07-14 ENCOUNTER — TELEPHONE (OUTPATIENT)
Dept: FAMILY MEDICINE | Facility: CLINIC | Age: 67
End: 2022-07-14

## 2022-07-14 NOTE — TELEPHONE ENCOUNTER
Patient Quality Outreach    Patient is due for the following:   Physical  - Overdue    NEXT STEPS:   Patient has upcoming appointment, these items will be addressed at that time.    Type of outreach:    Chart review performed, no outreach needed.       Patient has Annual Wellness scheduled for 8/1/22    Questions for provider review:    None     Belinda Bello CMA

## 2022-07-27 ENCOUNTER — APPOINTMENT (OUTPATIENT)
Dept: URBAN - METROPOLITAN AREA CLINIC 259 | Age: 67
Setting detail: DERMATOLOGY
End: 2022-07-27

## 2022-07-27 DIAGNOSIS — L57.8 OTHER SKIN CHANGES DUE TO CHRONIC EXPOSURE TO NONIONIZING RADIATION: ICD-10-CM

## 2022-07-27 DIAGNOSIS — L82.0 INFLAMED SEBORRHEIC KERATOSIS: ICD-10-CM

## 2022-07-27 DIAGNOSIS — L82.1 OTHER SEBORRHEIC KERATOSIS: ICD-10-CM

## 2022-07-27 DIAGNOSIS — Z71.89 OTHER SPECIFIED COUNSELING: ICD-10-CM

## 2022-07-27 DIAGNOSIS — L81.4 OTHER MELANIN HYPERPIGMENTATION: ICD-10-CM

## 2022-07-27 DIAGNOSIS — D18.0 HEMANGIOMA: ICD-10-CM

## 2022-07-27 DIAGNOSIS — D22 MELANOCYTIC NEVI: ICD-10-CM

## 2022-07-27 DIAGNOSIS — L70.0 ACNE VULGARIS: ICD-10-CM

## 2022-07-27 PROBLEM — D22.5 MELANOCYTIC NEVI OF TRUNK: Status: ACTIVE | Noted: 2022-07-27

## 2022-07-27 PROBLEM — D18.01 HEMANGIOMA OF SKIN AND SUBCUTANEOUS TISSUE: Status: ACTIVE | Noted: 2022-07-27

## 2022-07-27 PROCEDURE — OTHER LIQUID NITROGEN: OTHER

## 2022-07-27 PROCEDURE — 17110 DESTRUCT B9 LESION 1-14: CPT

## 2022-07-27 PROCEDURE — 99214 OFFICE O/P EST MOD 30 MIN: CPT | Mod: 25

## 2022-07-27 PROCEDURE — OTHER COUNSELING: OTHER

## 2022-07-27 PROCEDURE — OTHER PRESCRIPTION: OTHER

## 2022-07-27 PROCEDURE — OTHER DIAGNOSIS COMMENT: OTHER

## 2022-07-27 PROCEDURE — OTHER MIPS QUALITY: OTHER

## 2022-07-27 RX ORDER — TRETIONIN 0.5 MG/G
CREAM TOPICAL
Qty: 45 | Refills: 5 | Status: ERX | COMMUNITY
Start: 2022-07-27

## 2022-07-27 ASSESSMENT — LOCATION SIMPLE DESCRIPTION DERM
LOCATION SIMPLE: RIGHT UPPER BACK
LOCATION SIMPLE: LEFT CHEEK
LOCATION SIMPLE: NOSE
LOCATION SIMPLE: UPPER BACK
LOCATION SIMPLE: LEFT UPPER BACK

## 2022-07-27 ASSESSMENT — LOCATION DETAILED DESCRIPTION DERM
LOCATION DETAILED: INFERIOR THORACIC SPINE
LOCATION DETAILED: LEFT LATERAL MALAR CHEEK
LOCATION DETAILED: RIGHT SUPERIOR MEDIAL UPPER BACK
LOCATION DETAILED: LEFT MEDIAL UPPER BACK
LOCATION DETAILED: NASAL DORSUM
LOCATION DETAILED: LEFT INFERIOR MEDIAL UPPER BACK
LOCATION DETAILED: LEFT INFERIOR LATERAL UPPER BACK

## 2022-07-27 ASSESSMENT — LOCATION ZONE DERM
LOCATION ZONE: FACE
LOCATION ZONE: TRUNK
LOCATION ZONE: NOSE

## 2022-07-27 NOTE — PROCEDURE: DIAGNOSIS COMMENT
Render Risk Assessment In Note?: no
Comment: Mentioned patient can schedule appt with  to discuss anti aging products/services.
Detail Level: Simple

## 2022-07-27 NOTE — PROCEDURE: LIQUID NITROGEN
Spray Paint Text: The liquid nitrogen was applied to the skin utilizing a spray paint frosting technique.
Post-Care Instructions: I reviewed with the patient in detail post-care instructions. Patient is to wear sunprotection, and avoid picking at any of the treated lesions. Pt may apply Vaseline to crusted or scabbing areas.
Include Z78.9 (Other Specified Conditions Influencing Health Status) As An Associated Diagnosis?: No
Show Spray Paint Technique Variable?: Yes
Medical Necessity Clause: This procedure was medically necessary because the lesions that were treated were:
Total Number Of Lesions Treated: 6
Detail Level: Zone
Duration Of Freeze Thaw-Cycle (Seconds): 0
Consent: The patient's consent was obtained including but not limited to risks of crusting, scabbing, blistering, scarring, darker or lighter pigmentary change, recurrence, incomplete removal and infection.
Medical Necessity Information: It is in your best interest to select a reason for this procedure from the list below. All of these items fulfill various CMS LCD requirements except the new and changing color options.

## 2022-07-27 NOTE — PROCEDURE: COUNSELING
Detail Level: Generalized
Aklief counseling:  Patient advised to apply a pea-sized amount only at bedtime and wait 30 minutes after washing their face before applying.  If too drying, patient may add a non-comedogenic moisturizer.  The most commonly reported side effects including irritation, redness, scaling, dryness, stinging, burning, itching, and increased risk of sunburn.  The patient verbalized understanding of the proper use and possible adverse effects of retinoids.  All of the patient's questions and concerns were addressed.
Spironolactone Counseling: Patient advised regarding risks of diarrhea, abdominal pain, hyperkalemia, birth defects (for female patients), liver toxicity and renal toxicity. The patient may need blood work to monitor liver and kidney function and potassium levels while on therapy. The patient verbalized understanding of the proper use and possible adverse effects of spironolactone.  All of the patient's questions and concerns were addressed.
Isotretinoin Pregnancy And Lactation Text: This medication is Pregnancy Category X and is considered extremely dangerous during pregnancy. It is unknown if it is excreted in breast milk.
Topical Clindamycin Counseling: Patient counseled that this medication may cause skin irritation or allergic reactions.  In the event of skin irritation, the patient was advised to reduce the amount of the drug applied or use it less frequently.   The patient verbalized understanding of the proper use and possible adverse effects of clindamycin.  All of the patient's questions and concerns were addressed.
Bactrim Counseling:  I discussed with the patient the risks of sulfa antibiotics including but not limited to GI upset, allergic reaction, drug rash, diarrhea, dizziness, photosensitivity, and yeast infections.  Rarely, more serious reactions can occur including but not limited to aplastic anemia, agranulocytosis, methemoglobinemia, blood dyscrasias, liver or kidney failure, lung infiltrates or desquamative/blistering drug rashes.
Birth Control Pills Pregnancy And Lactation Text: This medication should be avoided if pregnant and for the first 30 days post-partum.
Tazorac Pregnancy And Lactation Text: This medication is not safe during pregnancy. It is unknown if this medication is excreted in breast milk.
Sarecycline Pregnancy And Lactation Text: This medication is Pregnancy Category D and not consider safe during pregnancy. It is also excreted in breast milk.
Azithromycin Counseling:  I discussed with the patient the risks of azithromycin including but not limited to GI upset, allergic reaction, drug rash, diarrhea, and yeast infections.
Benzoyl Peroxide Pregnancy And Lactation Text: This medication is Pregnancy Category C. It is unknown if benzoyl peroxide is excreted in breast milk.
Doxycycline Pregnancy And Lactation Text: This medication is Pregnancy Category D and not consider safe during pregnancy. It is also excreted in breast milk but is considered safe for shorter treatment courses.
Bactrim Pregnancy And Lactation Text: This medication is Pregnancy Category D and is known to cause fetal risk.  It is also excreted in breast milk.
Birth Control Pills Counseling: Birth Control Pill Counseling: I discussed with the patient the potential side effects of OCPs including but not limited to increased risk of stroke, heart attack, thrombophlebitis, deep venous thrombosis, hepatic adenomas, breast changes, GI upset, headaches, and depression.  The patient verbalized understanding of the proper use and possible adverse effects of OCPs. All of the patient's questions and concerns were addressed.
Include Pregnancy/Lactation Warning?: No
Aklief Pregnancy And Lactation Text: It is unknown if this medication is safe to use during pregnancy.  It is unknown if this medication is excreted in breast milk.  Breastfeeding women should use the topical cream on the smallest area of the skin for the shortest time needed while breastfeeding.  Do not apply to nipple and areola.
Spironolactone Pregnancy And Lactation Text: This medication can cause feminization of the male fetus and should be avoided during pregnancy. The active metabolite is also found in breast milk.
Benzoyl Peroxide Counseling: Patient counseled that medicine may cause skin irritation and bleach clothing.  In the event of skin irritation, the patient was advised to reduce the amount of the drug applied or use it less frequently.   The patient verbalized understanding of the proper use and possible adverse effects of benzoyl peroxide.  All of the patient's questions and concerns were addressed.
Erythromycin Pregnancy And Lactation Text: This medication is Pregnancy Category B and is considered safe during pregnancy. It is also excreted in breast milk.
Topical Sulfur Applications Counseling: Topical Sulfur Counseling: Patient counseled that this medication may cause skin irritation or allergic reactions.  In the event of skin irritation, the patient was advised to reduce the amount of the drug applied or use it less frequently.   The patient verbalized understanding of the proper use and possible adverse effects of topical sulfur application.  All of the patient's questions and concerns were addressed.
Azelaic Acid Pregnancy And Lactation Text: This medication is considered safe during pregnancy and breast feeding.
Winlevi Pregnancy And Lactation Text: This medication is considered safe during pregnancy and breastfeeding.
Sarecycline Counseling: Patient advised regarding possible photosensitivity and discoloration of the teeth, skin, lips, tongue and gums.  Patient instructed to avoid sunlight, if possible.  When exposed to sunlight, patients should wear protective clothing, sunglasses, and sunscreen.  The patient was instructed to call the office immediately if the following severe adverse effects occur:  hearing changes, easy bruising/bleeding, severe headache, or vision changes.  The patient verbalized understanding of the proper use and possible adverse effects of sarecycline.  All of the patient's questions and concerns were addressed.
Topical Clindamycin Pregnancy And Lactation Text: This medication is Pregnancy Category B and is considered safe during pregnancy. It is unknown if it is excreted in breast milk.
Isotretinoin Counseling: Patient should get monthly blood tests, not donate blood, not drive at night if vision affected, not share medication, and not undergo elective surgery for 6 months after tx completed. Side effects reviewed, pt to contact office should one occur.
Doxycycline Counseling:  Patient counseled regarding possible photosensitivity and increased risk for sunburn.  Patient instructed to avoid sunlight, if possible.  When exposed to sunlight, patients should wear protective clothing, sunglasses, and sunscreen.  The patient was instructed to call the office immediately if the following severe adverse effects occur:  hearing changes, easy bruising/bleeding, severe headache, or vision changes.  The patient verbalized understanding of the proper use and possible adverse effects of doxycycline.  All of the patient's questions and concerns were addressed.
Detail Level: Detailed
Dapsone Pregnancy And Lactation Text: This medication is Pregnancy Category C and is not considered safe during pregnancy or breast feeding.
Tazorac Counseling:  Patient advised that medication is irritating and drying.  Patient may need to apply sparingly and wash off after an hour before eventually leaving it on overnight.  The patient verbalized understanding of the proper use and possible adverse effects of tazorac.  All of the patient's questions and concerns were addressed.
Detail Level: Zone
Topical Retinoid counseling:  Patient advised to apply a pea-sized amount only at bedtime and wait 30 minutes after washing their face before applying.  If too drying, patient may add a non-comedogenic moisturizer. The patient verbalized understanding of the proper use and possible adverse effects of retinoids.  All of the patient's questions and concerns were addressed.
Winlevi Counseling:  I discussed with the patient the risks of topical clascoterone including but not limited to erythema, scaling, itching, and stinging. Patient voiced their understanding.
Topical Sulfur Applications Pregnancy And Lactation Text: This medication is Pregnancy Category C and has an unknown safety profile during pregnancy. It is unknown if this topical medication is excreted in breast milk.
Azelaic Acid Counseling: Patient counseled that medicine may cause skin irritation and to avoid applying near the eyes.  In the event of skin irritation, the patient was advised to reduce the amount of the drug applied or use it less frequently.   The patient verbalized understanding of the proper use and possible adverse effects of azelaic acid.  All of the patient's questions and concerns were addressed.
High Dose Vitamin A Counseling: Side effects reviewed, pt to contact office should one occur.
Minocycline Counseling: Patient advised regarding possible photosensitivity and discoloration of the teeth, skin, lips, tongue and gums.  Patient instructed to avoid sunlight, if possible.  When exposed to sunlight, patients should wear protective clothing, sunglasses, and sunscreen.  The patient was instructed to call the office immediately if the following severe adverse effects occur:  hearing changes, easy bruising/bleeding, severe headache, or vision changes.  The patient verbalized understanding of the proper use and possible adverse effects of minocycline.  All of the patient's questions and concerns were addressed.
High Dose Vitamin A Pregnancy And Lactation Text: High dose vitamin A therapy is contraindicated during pregnancy and breast feeding.
Topical Retinoid Pregnancy And Lactation Text: This medication is Pregnancy Category C. It is unknown if this medication is excreted in breast milk.
Dapsone Counseling: I discussed with the patient the risks of dapsone including but not limited to hemolytic anemia, agranulocytosis, rashes, methemoglobinemia, kidney failure, peripheral neuropathy, headaches, GI upset, and liver toxicity.  Patients who start dapsone require monitoring including baseline LFTs and weekly CBCs for the first month, then every month thereafter.  The patient verbalized understanding of the proper use and possible adverse effects of dapsone.  All of the patient's questions and concerns were addressed.
Azithromycin Pregnancy And Lactation Text: This medication is considered safe during pregnancy and is also secreted in breast milk.
Erythromycin Counseling:  I discussed with the patient the risks of erythromycin including but not limited to GI upset, allergic reaction, drug rash, diarrhea, increase in liver enzymes, and yeast infections.
Tetracycline Counseling: Patient counseled regarding possible photosensitivity and increased risk for sunburn.  Patient instructed to avoid sunlight, if possible.  When exposed to sunlight, patients should wear protective clothing, sunglasses, and sunscreen.  The patient was instructed to call the office immediately if the following severe adverse effects occur:  hearing changes, easy bruising/bleeding, severe headache, or vision changes.  The patient verbalized understanding of the proper use and possible adverse effects of tetracycline.  All of the patient's questions and concerns were addressed. Patient understands to avoid pregnancy while on therapy due to potential birth defects.

## 2022-07-27 NOTE — PROCEDURE: MIPS QUALITY
Quality 226: Preventive Care And Screening: Tobacco Use: Screening And Cessation Intervention: Patient screened for tobacco use and is an ex/non-smoker
Quality 431: Preventive Care And Screening: Unhealthy Alcohol Use - Screening: Patient not identified as an unhealthy alcohol user when screened for unhealthy alcohol use using a systematic screening method
Quality 130: Documentation Of Current Medications In The Medical Record: Current Medications Documented
Detail Level: Generalized
Quality 110: Preventive Care And Screening: Influenza Immunization: Influenza Immunization Ordered or Recommended, but not Administered due to system reason

## 2022-07-31 ASSESSMENT — ENCOUNTER SYMPTOMS
FREQUENCY: 0
HEMATOCHEZIA: 0
WEAKNESS: 0
HEARTBURN: 0
JOINT SWELLING: 0
CHILLS: 0
PALPITATIONS: 0
DYSURIA: 0
ABDOMINAL PAIN: 0
SHORTNESS OF BREATH: 0
DIARRHEA: 1
DIZZINESS: 0
BREAST MASS: 0
NERVOUS/ANXIOUS: 0
HEMATURIA: 0
NAUSEA: 0
FEVER: 0
ARTHRALGIAS: 0
SORE THROAT: 0
MYALGIAS: 0
COUGH: 0
PARESTHESIAS: 0
EYE PAIN: 0
CONSTIPATION: 0

## 2022-07-31 ASSESSMENT — ACTIVITIES OF DAILY LIVING (ADL): CURRENT_FUNCTION: NO ASSISTANCE NEEDED

## 2022-08-01 ENCOUNTER — OFFICE VISIT (OUTPATIENT)
Dept: FAMILY MEDICINE | Facility: CLINIC | Age: 67
End: 2022-08-01
Payer: MEDICARE

## 2022-08-01 VITALS
DIASTOLIC BLOOD PRESSURE: 80 MMHG | RESPIRATION RATE: 20 BRPM | OXYGEN SATURATION: 98 % | SYSTOLIC BLOOD PRESSURE: 120 MMHG | TEMPERATURE: 98.1 F | WEIGHT: 167 LBS | HEART RATE: 76 BPM | HEIGHT: 69 IN | BODY MASS INDEX: 24.73 KG/M2

## 2022-08-01 DIAGNOSIS — G62.2 POLYNEUROPATHY DUE TO OTHER TOXIC AGENTS (H): ICD-10-CM

## 2022-08-01 DIAGNOSIS — N36.8 URETHRAL IRRITATION: ICD-10-CM

## 2022-08-01 DIAGNOSIS — R73.9 ELEVATED BLOOD SUGAR: ICD-10-CM

## 2022-08-01 DIAGNOSIS — Z12.31 ENCOUNTER FOR SCREENING MAMMOGRAM FOR BREAST CANCER: ICD-10-CM

## 2022-08-01 DIAGNOSIS — C48.0 LIPOSARCOMA OF RETROPERITONEUM (H): ICD-10-CM

## 2022-08-01 DIAGNOSIS — E03.4 HYPOTHYROIDISM DUE TO ACQUIRED ATROPHY OF THYROID: ICD-10-CM

## 2022-08-01 DIAGNOSIS — Z00.00 ENCOUNTER FOR MEDICARE ANNUAL WELLNESS EXAM: Primary | ICD-10-CM

## 2022-08-01 DIAGNOSIS — K21.00 GASTROESOPHAGEAL REFLUX DISEASE WITH ESOPHAGITIS, UNSPECIFIED WHETHER HEMORRHAGE: ICD-10-CM

## 2022-08-01 DIAGNOSIS — K52.9 CHRONIC DIARRHEA: ICD-10-CM

## 2022-08-01 DIAGNOSIS — Z92.3 PERSONAL HISTORY OF RADIATION THERAPY: ICD-10-CM

## 2022-08-01 LAB
ALBUMIN UR-MCNC: NEGATIVE MG/DL
APPEARANCE UR: CLEAR
BACTERIA #/AREA URNS HPF: ABNORMAL /HPF
BILIRUB UR QL STRIP: NEGATIVE
COLOR UR AUTO: YELLOW
GLUCOSE UR STRIP-MCNC: NEGATIVE MG/DL
HGB UR QL STRIP: NEGATIVE
KETONES UR STRIP-MCNC: NEGATIVE MG/DL
LEUKOCYTE ESTERASE UR QL STRIP: ABNORMAL
NITRATE UR QL: NEGATIVE
PH UR STRIP: 5 [PH] (ref 5–7)
RBC #/AREA URNS AUTO: ABNORMAL /HPF
SP GR UR STRIP: 1.02 (ref 1–1.03)
SQUAMOUS #/AREA URNS AUTO: ABNORMAL /LPF
UROBILINOGEN UR STRIP-ACNC: 0.2 E.U./DL
WBC #/AREA URNS AUTO: ABNORMAL /HPF

## 2022-08-01 PROCEDURE — 90471 IMMUNIZATION ADMIN: CPT | Performed by: FAMILY MEDICINE

## 2022-08-01 PROCEDURE — 87086 URINE CULTURE/COLONY COUNT: CPT | Performed by: FAMILY MEDICINE

## 2022-08-01 PROCEDURE — G0009 ADMIN PNEUMOCOCCAL VACCINE: HCPCS | Mod: 59 | Performed by: FAMILY MEDICINE

## 2022-08-01 PROCEDURE — 90715 TDAP VACCINE 7 YRS/> IM: CPT | Performed by: FAMILY MEDICINE

## 2022-08-01 PROCEDURE — 90677 PCV20 VACCINE IM: CPT | Performed by: FAMILY MEDICINE

## 2022-08-01 PROCEDURE — 91306 COVID-19,PF,MODERNA (18+ YRS BOOSTER .25ML): CPT | Performed by: FAMILY MEDICINE

## 2022-08-01 PROCEDURE — 81001 URINALYSIS AUTO W/SCOPE: CPT | Performed by: FAMILY MEDICINE

## 2022-08-01 PROCEDURE — G0439 PPPS, SUBSEQ VISIT: HCPCS | Performed by: FAMILY MEDICINE

## 2022-08-01 PROCEDURE — 87186 SC STD MICRODIL/AGAR DIL: CPT | Performed by: FAMILY MEDICINE

## 2022-08-01 PROCEDURE — 0064A COVID-19,PF,MODERNA (18+ YRS BOOSTER .25ML): CPT | Performed by: FAMILY MEDICINE

## 2022-08-01 PROCEDURE — 99214 OFFICE O/P EST MOD 30 MIN: CPT | Mod: 25 | Performed by: FAMILY MEDICINE

## 2022-08-01 RX ORDER — TRAZODONE HYDROCHLORIDE 50 MG/1
1 TABLET, FILM COATED ORAL
COMMUNITY
End: 2022-08-01

## 2022-08-01 RX ORDER — GABAPENTIN 300 MG/1
300 CAPSULE ORAL 2 TIMES DAILY
Qty: 180 CAPSULE | Refills: 3 | Status: SHIPPED | OUTPATIENT
Start: 2022-08-01 | End: 2023-08-03

## 2022-08-01 RX ORDER — OMEPRAZOLE 40 MG/1
40 CAPSULE, DELAYED RELEASE ORAL DAILY
Qty: 90 CAPSULE | Refills: 3 | Status: SHIPPED | OUTPATIENT
Start: 2022-08-01 | End: 2023-06-23

## 2022-08-01 ASSESSMENT — ENCOUNTER SYMPTOMS
BREAST MASS: 0
DIARRHEA: 1
PARESTHESIAS: 0
SHORTNESS OF BREATH: 0
HEMATURIA: 0
ARTHRALGIAS: 0
PALPITATIONS: 0
HEMATOCHEZIA: 0
MYALGIAS: 0
HEARTBURN: 0
EYE PAIN: 0
SORE THROAT: 0
NERVOUS/ANXIOUS: 0
DIZZINESS: 0
NAUSEA: 0
FREQUENCY: 0
FEVER: 0
WEAKNESS: 0
DYSURIA: 0
ABDOMINAL PAIN: 0
CHILLS: 0
JOINT SWELLING: 0
COUGH: 0
CONSTIPATION: 0

## 2022-08-01 ASSESSMENT — PAIN SCALES - GENERAL: PAINLEVEL: NO PAIN (0)

## 2022-08-01 ASSESSMENT — ACTIVITIES OF DAILY LIVING (ADL): CURRENT_FUNCTION: NO ASSISTANCE NEEDED

## 2022-08-01 NOTE — PATIENT INSTRUCTIONS
Patient Education   Personalized Prevention Plan  You are due for the preventive services outlined below.  Your care team is available to assist you in scheduling these services.  If you have already completed any of these items, please share that information with your care team to update in your medical record.  Health Maintenance Due   Topic Date Due     Kidney Microalbumin Urine Test  Never done     Diabetic Foot Exam  Never done     Eye Exam  Never done     Zoster (Shingles) Vaccine (1 of 2) 11/18/2017     Pneumococcal Vaccine (2 - PPSV23 or PCV20) 09/23/2018     Diptheria Tetanus Pertussis (DTAP/TDAP/TD) Vaccine (3 - Td or Tdap) 12/09/2021     COVID-19 Vaccine (4 - Booster for Moderna series) 02/15/2022     Annual Wellness Visit  06/01/2022     ANNUAL REVIEW OF HM ORDERS  06/01/2022     Preventive Health Recommendations    See your health care provider every year to    Review health changes.     Discuss preventive care.      Review your medicines if your doctor has prescribed any.    You no longer need a yearly Pap test unless you've had an abnormal Pap test in the past 10 years. If you have vaginal symptoms, such as bleeding or discharge, be sure to talk with your provider about a Pap test.    Every 1 to 2 years, have a mammogram.  If you are over 69, talk with your health care provider about whether or not you want to continue having screening mammograms.    Every 10 years, have a colonoscopy. Or, have a yearly FIT test (stool test). These exams will check for colon cancer.     Have a cholesterol test every 5 years, or more often if your doctor advises it.     Have a diabetes test (fasting glucose) every three years. If you are at risk for diabetes, you should have this test more often.     At age 65, have a bone density scan (DEXA) to check for osteoporosis (brittle bone disease).    Shots:    Get a flu shot each year.    Get a tetanus shot every 10 years.    Talk to your doctor about your pneumonia  vaccines. There are now two you should receive - Pneumovax (PPSV 23) and Prevnar (PCV 13).    Talk to your pharmacist about the shingles vaccine.    Talk to your doctor about the hepatitis B vaccine.    Nutrition:     Eat at least 5 servings of fruits and vegetables each day.    Eat whole-grain bread, whole-wheat pasta and brown rice instead of white grains and rice.    Get adequate Calcium and Vitamin D.     Lifestyle    Exercise at least 150 minutes a week (30 minutes a day, 5 days a week). This will help you control your weight and prevent disease.    Limit alcohol to one drink per day.    No smoking.     Wear sunscreen to prevent skin cancer.     See your dentist twice a year for an exam and cleaning.    See your eye doctor every 1 to 2 years to screen for conditions such as glaucoma, macular degeneration and cataracts.    Personalized Prevention Plan  You are due for the preventive services outlined below.  Your care team is available to assist you in scheduling these services.  If you have already completed any of these items, please share that information with your care team to update in your medical record.  Health Maintenance   Topic Date Due     MICROALBUMIN  Never done     DIABETIC FOOT EXAM  Never done     EYE EXAM  Never done     ZOSTER IMMUNIZATION (1 of 2) 11/18/2017     Pneumococcal Vaccine: 65+ Years (2 - PPSV23 or PCV20) 09/23/2018     DTAP/TDAP/TD IMMUNIZATION (3 - Td or Tdap) 12/09/2021     COVID-19 Vaccine (4 - Booster for Moderna series) 02/15/2022     MEDICARE ANNUAL WELLNESS VISIT  06/01/2022     ANNUAL REVIEW OF HM ORDERS  06/01/2022     INFLUENZA VACCINE (1) 09/01/2022     A1C  09/10/2022     BMP  06/10/2023     LIPID  06/10/2023     TSH W/FREE T4 REFLEX  06/10/2023     FALL RISK ASSESSMENT  08/01/2023     MAMMO SCREENING  08/19/2023     ADVANCE CARE PLANNING  07/26/2026     COLORECTAL CANCER SCREENING  09/21/2026     DEXA  12/08/2036     HEPATITIS C SCREENING  Completed     PHQ-2 (once per  calendar year)  Completed     IPV IMMUNIZATION  Aged Out     MENINGITIS IMMUNIZATION  Aged Out

## 2022-08-01 NOTE — PROGRESS NOTES
"SUBJECTIVE:   Shiela Hewitt is a 67 year old female who presents for Preventive Visit.    Patient has been advised of split billing requirements and indicates understanding: Yes     Are you in the first 12 months of your Medicare coverage?  No    Healthy Habits:     In general, how would you rate your overall health?  Good    Frequency of exercise:  2-3 days/week    Duration of exercise:  15-30 minutes    Do you usually eat at least 4 servings of fruit and vegetables a day, include whole grains    & fiber and avoid regularly eating high fat or \"junk\" foods?  Yes    Taking medications regularly:  Yes    Medication side effects:  None    Ability to successfully perform activities of daily living:  No assistance needed    Home Safety:  No safety concerns identified    Hearing Impairment:  Need to ask people to speak up or repeat themselves and difficulty understanding soft or whispered speech    In the past 6 months, have you been bothered by leaking of urine?  No    In general, how would you rate your overall mental or emotional health?  Good      PHQ-2 Total Score: 0    Additional concerns today:  Yes    -- Red spot by urethra and wants it looked at.  She noticed it a few weeks ago.  She has some dysuria and blood in her urine a few times a year.  It has occurred a few times in [place] and seems related to dehydration.  She had frequence of urination a few days ago which resolved.        -- Would like to get order for Glucometer and supplies to check BS at home    -- Wondering about getting Rx for Coloplast- like a barrier cream      Do you feel safe in your environment? Yes    Have you ever done Advance Care Planning? (For example, a Health Directive, POLST, or a discussion with a medical provider or your loved ones about your wishes): Yes, patient states has an Advance Care Planning document and will bring a copy to the clinic.       Fall risk  Fallen 2 or more times in the past year?: No  Any fall with injury in " the past year?: No    Cognitive Screening   1) Repeat 3 items (Leader, Season, Table)    2) Clock draw: NORMAL  3) 3 item recall: Recalls 3 objects  Results: 3 items recalled: COGNITIVE IMPAIRMENT LESS LIKELY    Mini-CogTM Copyright S Soledad. Licensed by the author for use in [facility]; reprinted with permission ([email or web address]@[email or web address]). All rights reserved.      Do you have sleep apnea, excessive snoring or daytime drowsiness?: no    Reviewed and updated as needed this visit by clinical staff   Tobacco  Allergies  Meds   Med Hx  Surg Hx  Fam Hx  Soc Hx          Reviewed and updated as needed this visit by Provider                   Social History     Tobacco Use     Smoking status: Never Smoker     Smokeless tobacco: Never Used   Substance Use Topics     Alcohol use: Yes     Comment: seldom     If you drink alcohol do you typically have >3 drinks per day or >7 drinks per week? No    No flowsheet data found.      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  This was discovered in .  She has had surgery and radiation for this.  She does neuropathy from her radiation for cancer treatment and takes gabapentin.      She has reflux and takes omeprazole for this.  She has not been able to get off of it but stops it occasionally.  She has not tried an H2 blocker.  She is not taking calcium.  She does yogurt occasionally.      Her blood sugar has been elevated in the past.  Her twin sister had diabetes and  of an MI about 2 years ago.    Current providers sharing in care for this patient include:   Patient Care Team:  Mindy Vieira DO as PCP - General (Family Practice)  Sergio Haider MD as MD (General Surgery)  Rob Wiseman MD as MD (Hematology & Oncology)  Mindy Vieira DO as Assigned PCP  Dolly  Pearl, RN as Nurse Coordinator (Oncology)  Rob Wiseman MD as Assigned Cancer Care Provider  SURENDRA Mason MD as MD (Cardiovascular Disease)  SURENDRA Mason MD as Assigned Heart and Vascular Provider    The following health maintenance items are reviewed in Epic and correct as of today:  Health Maintenance Due   Topic Date Due     MICROALBUMIN  Never done     DIABETIC FOOT EXAM  Never done     EYE EXAM  Never done     ZOSTER IMMUNIZATION (1 of 2) 11/18/2017     Pneumococcal Vaccine: 65+ Years (2 - PPSV23 or PCV20) 09/23/2018     DTAP/TDAP/TD IMMUNIZATION (3 - Td or Tdap) 12/09/2021     COVID-19 Vaccine (4 - Booster for Moderna series) 02/15/2022     ANNUAL REVIEW OF HM ORDERS  06/01/2022     BP Readings from Last 3 Encounters:   08/01/22 120/80   06/29/22 125/73   11/22/21 118/66    Wt Readings from Last 3 Encounters:   08/01/22 75.8 kg (167 lb)   06/29/22 73 kg (161 lb)   11/22/21 72.1 kg (159 lb)                  Pneumonia Vaccine:For adults 65 years or older who do not have an immunocompromising condition, cerebrospinal fluid leak, or cochlear implant and want to receive PPSV23 ONLY: Administer 1 dose of PPSV23. Anyone who received any doses of PPSV23 before age 65 should receive 1 final dose of the vaccine at age 65 or older. Administer this last dose at least 5 years after the prior PPSV23 dose.  Mammogram Screening: Mammogram Screening: Recommended mammography every 1-2 years with patient discussion and risk factor consideration    FHS-7:   Breast CA Risk Assessment (FHS-7) 8/11/2021   Did any of your first-degree relatives have breast or ovarian cancer? No   Did any of your relatives have bilateral breast cancer? No   Did any man in your family have breast cancer? No   Did any woman in your family have breast and ovarian cancer? No   Did any woman in your family have breast cancer before age 50 y? No   Do you have 2 or more relatives with breast and/or ovarian cancer? No   Do you have 2  "or more relatives with breast and/or bowel cancer? No     click delete button to remove this line now  Mammogram Screening: Recommended mammography every 1-2 years with patient discussion and risk factor consideration  Pertinent mammograms are reviewed under the imaging tab.    Review of Systems   Constitutional: Negative for chills and fever.   HENT: Positive for hearing loss. Negative for congestion, ear pain and sore throat.    Eyes: Negative for pain.   Respiratory: Negative for cough and shortness of breath.    Cardiovascular: Negative for chest pain, palpitations and peripheral edema.   Gastrointestinal: Positive for diarrhea. Negative for abdominal pain, constipation, heartburn, hematochezia and nausea.   Breasts:  Negative for tenderness, breast mass and discharge.   Genitourinary: Negative for dysuria, frequency, genital sores, hematuria, pelvic pain, urgency, vaginal bleeding and vaginal discharge.   Musculoskeletal: Negative for arthralgias, joint swelling and myalgias.   Skin: Negative for rash.   Neurological: Negative for dizziness, weakness and paresthesias.   Psychiatric/Behavioral: Negative for mood changes. The patient is not nervous/anxious.          OBJECTIVE:   /80 (BP Location: Right arm, Patient Position: Sitting, Cuff Size: Adult Regular)   Pulse 76   Temp 98.1  F (36.7  C) (Oral)   Resp 20   Ht 1.74 m (5' 8.5\")   Wt 75.8 kg (167 lb)   SpO2 98%   BMI 25.02 kg/m   Estimated body mass index is 25.02 kg/m  as calculated from the following:    Height as of this encounter: 1.74 m (5' 8.5\").    Weight as of this encounter: 75.8 kg (167 lb).  Physical Exam  GENERAL: healthy, alert and no distress  EYES: Eyes grossly normal to inspection, PERRL and conjunctivae and sclerae normal  HENT: ear canals and TM's normal, nose and mouth without ulcers or lesions  NECK: no adenopathy, no asymmetry, masses, or scars and thyroid normal to palpation  RESP: lungs clear to auscultation - no rales, " rhonchi or wheezes  BREAST: normal without masses, tenderness or nipple discharge and no palpable axillary masses or adenopathy  CV: regular rate and rhythm, normal S1 S2, no S3 or S4, no murmur, click or rub, no peripheral edema and peripheral pulses strong  ABDOMEN: soft, nontender, no hepatosplenomegaly, no masses and bowel sounds normal   (female): normal female external genitalia, vaginal mucosa pink, moist, well rugated and enlarged urethral meatus.  No mass seen however can see the mucosa inside the urethra  MS: no gross musculoskeletal defects noted, no edema  SKIN: no suspicious lesions or rashes  NEURO: Normal strength and tone, mentation intact and speech normal  PSYCH: mentation appears normal, affect normal/bright    Diagnostic Test Results:  Labs reviewed in Epic    ASSESSMENT / PLAN:   (Z00.00) Encounter for Medicare annual wellness exam  (primary encounter diagnosis)  Comment:   Plan: COVID-19,PF,MODERNA (18+ YRS BOOSTER .25ML)            (N36.8) Urethral irritation  Comment: Her urethra appears dilated on exam.  She is having urinary symptoms.  Today's UA shows possible infection.  Will await culture.  Patient will likely need to see urology.  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    Plan: gabapentin (NEURONTIN) 300 MG capsule,         OFFICE/OUTPT VISIT,EST,LEVL IV            (K21.00) Gastroesophageal reflux disease with esophagitis, unspecified whether hemorrhage  Comment: I discussed with the patient that PPIs reduce absorption of calcium.  I have suggested she try to use an H2 blocker instead of a PPI.  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  Plan: OFFICE/OUTPT VISIT,EST,LEVL IV            (E03.4) Hypothyroidism due to acquired atrophy of thyroid  Comment: The current medical regimen is effective;  continue present plan and medications.    Plan: OFFICE/OUTPT VISIT,EST,LEVL IV            (Z92.3) Personal history of radiation therapy  Comment:   Plan: UA with Microscopic reflex to Culture - lab         collect, Urine Microscopic, Urine Culture,         OFFICE/OUTPT VISIT,EST,LEVL IV            (R73.9) Elevated blood sugar  Comment: The patient was given supplies to test her blood sugar periodically  Plan: blood glucose (NO BRAND SPECIFIED) test strip,         blood glucose monitoring (NO BRAND SPECIFIED)         meter device kit, blood glucose (NO BRAND         SPECIFIED) lancets standard, OFFICE/OUTPT         VISIT,EST,LEVL IV, CANCELED: Albumin Random         Urine Quantitative with Creat Ratio, CANCELED:         FOOT EXAM            (K52.9) Chronic diarrhea  Comment: Chronic diarrhea can cause anal irritation the patient is requesting Coloplast barrier cream as she has used this in the past  Plan: Coloplast barrier cream CREA, OFFICE/OUTPT         VISIT,EST,LEVL IV            (Z12.31) Encounter for screening mammogram for breast cancer  Comment:   Plan: MA Screen Bilateral w/Saji                  COUNSELING:  Reviewed preventive health counseling, as reflected in patient instructions       Regular exercise       Healthy diet/nutrition    Estimated body mass index is 25.02 kg/m  as calculated from the following:    Height as of this encounter: 1.74 m (5' 8.5\").    Weight as of this encounter: 75.8 kg (167 lb).        She reports that she has never smoked. She has never used smokeless tobacco.      Appropriate preventive services were discussed with this patient, " including applicable screening as appropriate for cardiovascular disease, diabetes, osteopenia/osteoporosis, and glaucoma.  As appropriate for age/gender, discussed screening for colorectal cancer, prostate cancer, breast cancer, and cervical cancer. Checklist reviewing preventive services available has been given to the patient.    Reviewed patients plan of care and provided an AVS. The Basic Care Plan (routine screening as documented in Health Maintenance) for Shiela meets the Care Plan requirement. This Care Plan has been established and reviewed with the Patient.    Counseling Resources:  ATP IV Guidelines  Pooled Cohorts Equation Calculator  Breast Cancer Risk Calculator  Breast Cancer: Medication to Reduce Risk  FRAX Risk Assessment  ICSI Preventive Guidelines  Dietary Guidelines for Americans, 2010  USDA's MyPlate  ASA Prophylaxis  Lung CA Screening    DO MELONY Mcclure [facility]    Identified Health Risks:

## 2022-08-02 LAB — BACTERIA UR CULT: ABNORMAL

## 2022-08-03 ENCOUNTER — TELEPHONE (OUTPATIENT)
Dept: FAMILY MEDICINE | Facility: CLINIC | Age: 67
End: 2022-08-03

## 2022-08-03 DIAGNOSIS — N30.00 ACUTE CYSTITIS WITHOUT HEMATURIA: Primary | ICD-10-CM

## 2022-08-03 DIAGNOSIS — N30.01 ACUTE CYSTITIS WITH HEMATURIA: Primary | ICD-10-CM

## 2022-08-03 DIAGNOSIS — R73.9 ELEVATED BLOOD SUGAR: ICD-10-CM

## 2022-08-03 RX ORDER — CEPHALEXIN 500 MG/1
500 CAPSULE ORAL 3 TIMES DAILY
Qty: 9 CAPSULE | Refills: 0 | Status: SHIPPED | OUTPATIENT
Start: 2022-08-03 | End: 2022-08-06

## 2022-08-03 RX ORDER — NITROFURANTOIN 25; 75 MG/1; MG/1
100 CAPSULE ORAL 2 TIMES DAILY
Qty: 14 CAPSULE | Refills: 0 | Status: SHIPPED | OUTPATIENT
Start: 2022-08-03 | End: 2022-08-26

## 2022-08-03 NOTE — TELEPHONE ENCOUNTER
[id number] faxed Need For Clarification re:   blood glucose monitoring (NO BRAND SPECIFIED) meter device kit,  LANCETS, TEST STRIPS.    Notes to Prescriber:  Need new DX Code.  This one not covered.  Also, can't have as directed.

## 2022-08-03 NOTE — TELEPHONE ENCOUNTER
Called and notified pt of message from provider below. She will go  antibiotic and start taking it.     Natty Kelley RN

## 2022-08-03 NOTE — TELEPHONE ENCOUNTER
"Routing to PCP- see message from pharmacy.    Need different dx code- \"Elevated blood sugar [R73.9]\" not covered    Nurse also removed wording on sig \"as directed\"    Rx pending for new dx code.     Natty Kelley RN    "

## 2022-08-03 NOTE — TELEPHONE ENCOUNTER
"Routing to [facility] pool in Dr. Isha mckeon     Pt calling in to review her urine culture results done 8/1/22. Results showing \">100,000 CFU/mL Escherichia coli Abnormal \"    Pt wondering if she needs treatment for this.     Pt uses [facility] pharmacy in [place] if needed    Natty Kelley RN       " no tenderness

## 2022-08-03 NOTE — TELEPHONE ENCOUNTER
Yes she should have treatment (chart reviewed, was symptomatic at the time of collection).  Dr. Vieira also suggested she may need to go to urology as well.  This is not an urgent referral if it is needed, so will forward to Dr. Vieira to consider and make that determination.    Antibiotics sent to local pharmacy on her list.

## 2022-08-08 ENCOUNTER — TELEPHONE (OUTPATIENT)
Dept: FAMILY MEDICINE | Facility: CLINIC | Age: 67
End: 2022-08-08

## 2022-08-08 DIAGNOSIS — R73.9 ELEVATED BLOOD SUGAR: ICD-10-CM

## 2022-08-08 NOTE — TELEPHONE ENCOUNTER
[id number] faxed Need for Clarification re: ACCU-CHEK GUIDE KIT    Notes to Prescriber:  Please advise we need new RX with an ICD 10 Code on it.

## 2022-08-23 ENCOUNTER — ANCILLARY PROCEDURE (OUTPATIENT)
Dept: MAMMOGRAPHY | Facility: CLINIC | Age: 67
End: 2022-08-23
Attending: FAMILY MEDICINE
Payer: MEDICARE

## 2022-08-23 DIAGNOSIS — Z12.31 ENCOUNTER FOR SCREENING MAMMOGRAM FOR BREAST CANCER: ICD-10-CM

## 2022-08-23 PROCEDURE — 77063 BREAST TOMOSYNTHESIS BI: CPT | Mod: TC | Performed by: RADIOLOGY

## 2022-08-23 PROCEDURE — 77067 SCR MAMMO BI INCL CAD: CPT | Mod: TC | Performed by: RADIOLOGY

## 2022-08-26 ENCOUNTER — TELEPHONE (OUTPATIENT)
Dept: FAMILY MEDICINE | Facility: CLINIC | Age: 67
End: 2022-08-26

## 2022-08-26 ENCOUNTER — E-VISIT (OUTPATIENT)
Dept: FAMILY MEDICINE | Facility: CLINIC | Age: 67
End: 2022-08-26
Payer: MEDICARE

## 2022-08-26 DIAGNOSIS — N30.90 BLADDER INFECTION: Primary | ICD-10-CM

## 2022-08-26 DIAGNOSIS — N30.00 ACUTE CYSTITIS WITHOUT HEMATURIA: Primary | ICD-10-CM

## 2022-08-26 LAB
ALBUMIN UR-MCNC: 30 MG/DL
APPEARANCE UR: ABNORMAL
BACTERIA #/AREA URNS HPF: ABNORMAL /HPF
BILIRUB UR QL STRIP: NEGATIVE
COLOR UR AUTO: YELLOW
GLUCOSE UR STRIP-MCNC: NEGATIVE MG/DL
HGB UR QL STRIP: ABNORMAL
KETONES UR STRIP-MCNC: NEGATIVE MG/DL
LEUKOCYTE ESTERASE UR QL STRIP: ABNORMAL
MUCOUS THREADS #/AREA URNS LPF: PRESENT /LPF
NITRATE UR QL: NEGATIVE
PH UR STRIP: 5.5 [PH] (ref 5–7)
RBC #/AREA URNS AUTO: ABNORMAL /HPF
SP GR UR STRIP: >=1.03 (ref 1–1.03)
SQUAMOUS #/AREA URNS AUTO: ABNORMAL /LPF
UROBILINOGEN UR STRIP-ACNC: 0.2 E.U./DL
WBC #/AREA URNS AUTO: ABNORMAL /HPF
WBC CLUMPS #/AREA URNS HPF: PRESENT /HPF

## 2022-08-26 PROCEDURE — 81001 URINALYSIS AUTO W/SCOPE: CPT | Performed by: FAMILY MEDICINE

## 2022-08-26 PROCEDURE — 87086 URINE CULTURE/COLONY COUNT: CPT | Performed by: FAMILY MEDICINE

## 2022-08-26 PROCEDURE — 87186 SC STD MICRODIL/AGAR DIL: CPT | Performed by: FAMILY MEDICINE

## 2022-08-26 PROCEDURE — 99421 OL DIG E/M SVC 5-10 MIN: CPT | Performed by: FAMILY MEDICINE

## 2022-08-26 RX ORDER — NITROFURANTOIN 25; 75 MG/1; MG/1
100 CAPSULE ORAL 2 TIMES DAILY
Qty: 14 CAPSULE | Refills: 0 | Status: SHIPPED | OUTPATIENT
Start: 2022-08-26 | End: 2023-06-13

## 2022-08-26 NOTE — TELEPHONE ENCOUNTER
Called patient back and let her know to start an e-visit through [employer].    She will get this done today.      Arleth Gallardo RN

## 2022-08-26 NOTE — PATIENT INSTRUCTIONS
Dear Shiela Hewitt,     After reviewing your responses, I would like you to come in for a urine test to make sure we treat you correctly. This urine test is to evaluate you for a possible urinary tract infection, and should be scheduled for today or tomorrow. Schedule a Lab Only appointment here.     Lab appointments are not available at most locations on the weekends. If no Lab Only appointment is available, you should be seen in any of our convenient Walk-in or Urgent Care Centers, which can be found on our website here.     You will receive instructions with your results in [employer] once they are available.     If your symptoms worsen, you develop pain in your back or stomach, develop fevers, or are not improving in 5 days, please contact your primary care provider for an appointment or visit a Walk-in or Urgent Care Center to be seen.     Thanks again for choosing us as your health care partner,     Mindy Vieira, DO

## 2022-08-26 NOTE — TELEPHONE ENCOUNTER
Patient calling regarding repeated UTI    She last had UA done 8/1 during physical.  Her UA results did show infection and she was treated.    Urine is cloudy, pressure, urgency present x1 day (which is similar to last infection)    She is open to seeing a urologist as well but she would like to drop a Urine sample off today.        Arleth Gallardo RN

## 2022-08-27 ENCOUNTER — MYC MEDICAL ADVICE (OUTPATIENT)
Dept: FAMILY MEDICINE | Facility: CLINIC | Age: 67
End: 2022-08-27

## 2022-08-27 DIAGNOSIS — N39.0 RECURRENT UTI: Primary | ICD-10-CM

## 2022-08-28 LAB — BACTERIA UR CULT: ABNORMAL

## 2022-08-29 NOTE — TELEPHONE ENCOUNTER
MyChart sent.  Need to clarify if patient is having hematuria or UTI symptoms at this time.    Herson Sarmiento RN

## 2022-08-29 NOTE — TELEPHONE ENCOUNTER
Patient requesting referral to urology for recent UTI infections.     See 8/26/22 labs.    Routed to PCP to review and advise.     Misa Ramirez, RN, BSN, PHN  [facility] [place]

## 2022-09-01 ENCOUNTER — TELEPHONE (OUTPATIENT)
Dept: FAMILY MEDICINE | Facility: CLINIC | Age: 67
End: 2022-09-01

## 2022-09-01 NOTE — TELEPHONE ENCOUNTER
RN received call from patient.    Patient has questions regarding urine culture.    Patient was asking about sensitivities.  Asking if it was sensitive to Cipro or Keflex for future reference.    Patient on Macrobid and it was not oh her formulary.    RN advised culture showed resistance to cipro.    Patient verbalized understanding.    David Catalan, RN, BSN, PHN  [facility]

## 2022-09-01 NOTE — TELEPHONE ENCOUNTER
Called patient and gave her the phone number on the urology referral.    RANDALL Alvarez  [facility]

## 2022-09-09 NOTE — TELEPHONE ENCOUNTER
MEDICAL RECORDS REQUEST   [facility]  Urology Clinic  [address] [place] [address]  PHONE: [phone]  Fax: [phone]        FUTURE VISIT INFORMATION                                                   Shiela Hewitt, : 1955 scheduled for future visit at [facility]    APPOINTMENT INFORMATION:    Date: 2022    Provider:  Lang Davis MD    Reason for Visit/Diagnosis: Recurrent UTI    REFERRAL INFORMATION:    Referring provider:  Nidia Larry DO in [facility]    RECORDS REQUESTED FOR VISIT                                                     NOTES  STATUS/DETAILS   OFFICE NOTE from other specialist  yes, 2022 -- Mindy Vieira DO @ [facility]   MEDICATION LIST  yes   LABS     URINALYSIS (UA)  yes   IMAGING (IMAGES & REPORT)  yes, 06/10/2022, 2021 -- CT CHEST ABD PELVIC     PRE-VISIT CHECKLIST      Record collection complete Yes   Appointment appropriately scheduled           (right time/right provider) Yes   Joint diagnostic appointment coordinated correctly          (ensure right order & amount of time) Yes   MyChart activation Yes   Questionnaire complete If no, please explain pending

## 2022-09-29 ENCOUNTER — MYC MEDICAL ADVICE (OUTPATIENT)
Dept: FAMILY MEDICINE | Facility: CLINIC | Age: 67
End: 2022-09-29

## 2022-10-22 ENCOUNTER — HEALTH MAINTENANCE LETTER (OUTPATIENT)
Age: 67
End: 2022-10-22

## 2022-11-03 ENCOUNTER — PRE VISIT (OUTPATIENT)
Dept: UROLOGY | Facility: CLINIC | Age: 67
End: 2022-11-03

## 2022-11-03 NOTE — TELEPHONE ENCOUNTER
Reason for visit: Consult (referred by Nidia Larry DO from [facility])     Relevant information: Recurrent UTI; urethral irritation, hx of radiation in pelvis    Records/imaging/labs/orders: All records available    Pt called: Send [employer] message about appt    At Rooming: Standard

## 2022-11-25 ENCOUNTER — OFFICE VISIT (OUTPATIENT)
Dept: UROLOGY | Facility: CLINIC | Age: 67
End: 2022-11-25
Attending: FAMILY MEDICINE
Payer: MEDICARE

## 2022-11-25 ENCOUNTER — NURSE TRIAGE (OUTPATIENT)
Dept: ONCOLOGY | Facility: CLINIC | Age: 67
End: 2022-11-25

## 2022-11-25 ENCOUNTER — PRE VISIT (OUTPATIENT)
Dept: UROLOGY | Facility: CLINIC | Age: 67
End: 2022-11-25

## 2022-11-25 VITALS
DIASTOLIC BLOOD PRESSURE: 69 MMHG | WEIGHT: 162 LBS | BODY MASS INDEX: 23.99 KG/M2 | HEART RATE: 95 BPM | SYSTOLIC BLOOD PRESSURE: 112 MMHG | HEIGHT: 69 IN

## 2022-11-25 DIAGNOSIS — R31.0 GROSS HEMATURIA: Primary | ICD-10-CM

## 2022-11-25 DIAGNOSIS — N39.0 RECURRENT UTI: ICD-10-CM

## 2022-11-25 LAB
ALBUMIN UR-MCNC: NEGATIVE MG/DL
APPEARANCE UR: CLEAR
BILIRUB UR QL STRIP: NEGATIVE
COLOR UR AUTO: YELLOW
GLUCOSE UR STRIP-MCNC: NEGATIVE MG/DL
HGB UR QL STRIP: NEGATIVE
KETONES UR STRIP-MCNC: NEGATIVE MG/DL
LEUKOCYTE ESTERASE UR QL STRIP: NEGATIVE
NITRATE UR QL: NEGATIVE
PH UR STRIP: 5.5 [PH] (ref 5–8)
SP GR UR STRIP: >=1.03 (ref 1–1.03)
UROBILINOGEN UR STRIP-ACNC: 0.2 E.U./DL

## 2022-11-25 PROCEDURE — 99204 OFFICE O/P NEW MOD 45 MIN: CPT | Performed by: OBSTETRICS & GYNECOLOGY

## 2022-11-25 PROCEDURE — 81003 URINALYSIS AUTO W/O SCOPE: CPT | Performed by: PATHOLOGY

## 2022-11-25 RX ORDER — ESTRADIOL 0.1 MG/G
1 CREAM VAGINAL
Qty: 42.5 G | Refills: 11 | Status: SHIPPED | OUTPATIENT
Start: 2022-11-28 | End: 2023-06-23

## 2022-11-25 RX ORDER — NITROFURANTOIN 25; 75 MG/1; MG/1
100 CAPSULE ORAL DAILY
Qty: 90 CAPSULE | Refills: 1 | Status: SHIPPED | OUTPATIENT
Start: 2022-11-25 | End: 2024-09-12

## 2022-11-25 ASSESSMENT — PAIN SCALES - GENERAL: PAINLEVEL: NO PAIN (0)

## 2022-11-25 NOTE — PROGRESS NOTES
November 25, 2022    Referring Provider: Nidia Larry DO  [address] [place] [address]    Primary Care Provider: Mindy Vieira    CC: hematuria    HPI:  Shiela Hewitt is a 67 year old female who presents for evaluation of her pelvic floor symptoms.  She has had 2 episodes of hematuria in the past 2 years once in earlier this year and 2020. Not associated with UTIs.. Also had 2 culture positive UTIs in 8/2022. She also had symptoms of a UTI in Oct, no culture    Hx significant for retroperitoneal sarcoma with extensive bowel resection.     Also has small urethral caruncle    Past Medical History:   Diagnosis Date     Antiphospholipid antibody positive      Constipation      Gastro-oesophageal reflux disease      History of blood transfusion 2014     Hydronephrosis      Hypothyroidism      Liposarcoma (H)      Sinusitis      Type 2 diabetes mellitus without complication, without long-term current use of insulin (H) 6/1/2021       Past Surgical History:   Procedure Laterality Date     APPENDECTOMY  1/2014     BIOPSY  2013     CL AFF SURGICAL PATHOLOGY      Champagne's neuroma excision     COLONOSCOPY  2016     COMBINED CYSTOSCOPY, INSERT STENT URETER(S)  1/28/2014    Procedure: COMBINED CYSTOSCOPY, INSERT STENT URETER(S);  Cystoscopy with Bilateral Stent Placement, Fluoroscopy,  Resection Retroperitoneal Sarcoma, Resection of Right Colon and Terminal Ileum  Anesthesia General with Block;  Surgeon: Nnacy Arredondo MD;  [facility]     COMBINED CYSTOSCOPY, INSERT STENT URETER(S) Right 12/3/2014    Procedure: COMBINED CYSTOSCOPY, INSERT STENT URETER(S);  Surgeon: Mehdi Nicholson MD;  [facility]     ECTOPIC PREGNANCY SURGERY       ETHMOIDECTOMY       EXCISE MASS ABDOMEN N/A 12/3/2014    Procedure: EXCISE MASS ABDOMEN;  Surgeon: Sergio Haider MD;  Location: [facility]     HERNIORRHAPHY INCISIONAL (LOCATION) N/A 12/3/2014    Procedure: HERNIORRHAPHY INCISIONAL (LOCATION);  Surgeon:  Sergio Haider MD;  Location[facility]     IR PORT REMOVAL RIGHT  6/29/2022     RESECT TUMOR RETROPERITONEAL  1/28/2014    Procedure: RESECT TUMOR RETROPERITONEAL;;  Surgeon: Sergio Haider MD;  Location[facility]     TONSILLECTOMY       TUBAL LIGATION       VASCULAR SURGERY  10/08/2010    venous ablation Rt leg       Social History     Socioeconomic History     Marital status:      Spouse name: Not on file     Number of children: Not on file     Years of education: Not on file     Highest education level: Not on file   Occupational History     Not on file   Tobacco Use     Smoking status: Never     Smokeless tobacco: Never   Vaping Use     Vaping Use: Never used   Substance and Sexual Activity     Alcohol use: Yes     Comment: seldom     Drug use: No     Sexual activity: Not Currently     Partners: Male     Birth control/protection: Post-menopausal   Other Topics Concern     Parent/sibling w/ CABG, MI or angioplasty before 65F 55M? Yes     Comment: sister had mesenteric artery bypass and recently passed away   Social History Narrative     Not on file     Social Determinants of Health     Financial Resource Strain: Not on file   Food Insecurity: Not on file   Transportation Needs: Not on file   Physical Activity: Not on file   Stress: Not on file   Social Connections: Not on file   Intimate Partner Violence: Not on file   Housing Stability: Not on file       Family History   Problem Relation Age of Onset     Diabetes Sister      Coronary Artery Disease Father      Cerebrovascular Disease Father      Depression Sister      Obesity Sister      Hyperlipidemia Mother      Substance Abuse Son        ROS    Allergies   Allergen Reactions     Contrast Dye Hives and Unknown     Patient has prescription for medrol to take prior to scan     Kytril [Granisetron] Other (See Comments)     Kytril causes severe headaches.     No Clinical Screening - See Comments Rash     Ondansetron Other (See Comments) and Unknown     Zofran  "may cause headaches.       Current Outpatient Medications   Medication     acyclovir (ZOVIRAX) 5 % external ointment     blood glucose (NO BRAND SPECIFIED) lancets standard     blood glucose (NO BRAND SPECIFIED) test strip     blood glucose monitoring (NO BRAND SPECIFIED) meter device kit     Coloplast barrier cream CREA     gabapentin (NEURONTIN) 300 MG capsule     levothyroxine (EUTHYROX) 75 MCG tablet     meclizine (ANTIVERT) 25 MG tablet     nitroFURantoin macrocrystal-monohydrate (MACROBID) 100 MG capsule     omeprazole (PRILOSEC) 40 MG DR capsule     tiZANidine (ZANAFLEX) 4 MG tablet     UNABLE TO FIND     valACYclovir (VALTREX) 500 MG tablet     No current facility-administered medications for this visit.     Facility-Administered Medications Ordered in Other Visits   Medication     heparin 100 UNIT/ML injection 500 Units       /69   Pulse 95   Ht 1.74 m (5' 8.5\")   Wt 73.5 kg (162 lb)   BMI 24.27 kg/m   No LMP recorded. Patient is postmenopausal. Body mass index is 24.27 kg/m .  Ms. Hewitt is alert, comfortable in no acute distress, non-labored breathing.   Abdomen is soft, non-tender, non-distended, no CVAT.    Normal external female genitalia. The urethra had a small caruncle, o/w WNL.    She has goo support on supine strain.  Speculum and bimanual exam are remarkable for atrophic vaginal epithelium.        Urine dip WNL    A/P: Shiela Hewitt is a 67 year old F with h/o hematuria and 3 UTIs in the past 4 months    Start macrobid prophylaxis. Estrace cream twice weekly. Schedule cystoscopy. Shiela had a CT of the chest, abdoman and pelvis in 6/22 that was WNL.    I spent a total of 45 minutes with  Ms. Hewitt  on the date of the encounter in chart review, face to face patient visit, review of tests, documentation and/or discussion with other providers about the issues documented above.    Lang Davis MD  Professor, OB/GYN  Urogynecologist  CC  Patient Care Team:  Mindy Vieira DO as PCP - " General (Family Practice)  Sergio Haider MD as MD (General Surgery)  Rob Wiseman MD as MD (Hematology & Oncology)  Mindy Vieira DO as Assigned PCP  Pearl Alberto, RN as Nurse Coordinator (Oncology)  Rob Wiseman MD as Assigned Cancer Care Provider  SURENDRA Mason MD as MD (Cardiovascular Disease)  SURENDRA Mason MD as Assigned Heart and Vascular Provider  PATI LYNN

## 2022-11-25 NOTE — TELEPHONE ENCOUNTER
[facility] Triage    Incoming call: Her methlypred  and she needs a new prescription for her CT next Friday.  She starts taking it next Thursday.              Copied from Dr. Wiseman note on 22:  Contrast allergy requires methylpred; She has steroid Rx for the next scan and takes that.

## 2022-11-25 NOTE — NURSING NOTE
"Chief Complaint   Patient presents with     Hematuria     Gross hematuria, April 2020 no UTI, again earlier this year-did not get tested for a UTI that time. Has had radiation in the pelvic region.     Consult For     Maicol, 2 in August, might have a caruncle        Blood pressure 112/69, pulse 95, height 1.74 m (5' 8.5\"), weight 73.5 kg (162 lb). Body mass index is 24.27 kg/m .    Patient Active Problem List   Diagnosis     Liposarcoma (H)     Kaposi's sarcoma (H)     Hypothyroidism     Antiphospholipid antibody syndrome (H)     BPPV (benign paroxysmal positional vertigo)     Esophageal reflux     History of herpes genitalis     Displacement of lumbar intervertebral disc without myelopathy     Anemia in neoplastic disease       Allergies   Allergen Reactions     Contrast Dye Hives and Unknown     Patient has prescription for medrol to take prior to scan     Kytril [Granisetron] Other (See Comments)     Kytril causes severe headaches.     No Clinical Screening - See Comments Rash     Ondansetron Other (See Comments) and Unknown     Zofran may cause headaches.       Current Outpatient Medications   Medication Sig Dispense Refill     acyclovir (ZOVIRAX) 5 % external ointment 1 application to affected area       blood glucose (NO BRAND SPECIFIED) lancets standard Use to test blood sugar 1 times daily 100 each 3     blood glucose (NO BRAND SPECIFIED) test strip Use to test blood sugar 1 times daily 100 strip 3     blood glucose monitoring (NO BRAND SPECIFIED) meter device kit Use to test blood sugar 1 times daily 1 kit 0     Coloplast barrier cream CREA Apply 5 g topically 2 times daily 100 g 3     gabapentin (NEURONTIN) 300 MG capsule Take 1 capsule (300 mg) by mouth 2 times daily 180 capsule 3     levothyroxine (EUTHYROX) 75 MCG tablet Take 1 tablet (75 mcg) by mouth daily 90 tablet 3     meclizine (ANTIVERT) 25 MG tablet 25 mg 3 times daily as needed       nitroFURantoin macrocrystal-monohydrate (MACROBID) 100 MG " capsule Take 1 capsule (100 mg) by mouth 2 times daily 14 capsule 0     omeprazole (PRILOSEC) 40 MG DR capsule Take 1 capsule (40 mg) by mouth daily 90 capsule 3     tiZANidine (ZANAFLEX) 4 MG tablet Take 1 tablet by mouth as needed       UNABLE TO FIND Apply 1 drop to eye        valACYclovir (VALTREX) 500 MG tablet Take 500 mg by mouth as needed         Social History     Tobacco Use     Smoking status: Never     Smokeless tobacco: Never   Vaping Use     Vaping Use: Never used   Substance Use Topics     Alcohol use: Yes     Comment: seldom     Drug use: No       Chanelle Moctezuma  11/25/2022  12:56 PM

## 2022-11-25 NOTE — LETTER
11/25/2022       RE: Shiela Hewitt  [address]   [place] [address]     Dear Colleague,    Thank you for referring your patient, Shiela Hewitt, to the [facility] [place] [facility]. Please see a copy of my visit note below.    November 25, 2022    Referring Provider: Nidia Larry DO  [address] [place] [address]    Primary Care Provider: Mindy Vieira    CC: hematuria    HPI:  Shiela Hewitt is a 67 year old female who presents for evaluation of her pelvic floor symptoms.  She has had 2 episodes of hematuria in the past 2 years once in earlier this year and 2020. Not associated with UTIs.. Also had 2 culture positive UTIs in 8/2022. She also had symptoms of a UTI in Oct, no culture    Hx significant for retroperitoneal sarcoma with extensive bowel resection.     Also has small urethral caruncle    Past Medical History:   Diagnosis Date     Antiphospholipid antibody positive      Constipation      Gastro-oesophageal reflux disease      History of blood transfusion 2014     Hydronephrosis      Hypothyroidism      Liposarcoma (H)      Sinusitis      Type 2 diabetes mellitus without complication, without long-term current use of insulin (H) 6/1/2021       Past Surgical History:   Procedure Laterality Date     APPENDECTOMY  1/2014     BIOPSY  2013     CL AFF SURGICAL PATHOLOGY      Champagne's neuroma excision     COLONOSCOPY  2016     COMBINED CYSTOSCOPY, INSERT STENT URETER(S)  1/28/2014    Procedure: COMBINED CYSTOSCOPY, INSERT STENT URETER(S);  Cystoscopy with Bilateral Stent Placement, Fluoroscopy,  Resection Retroperitoneal Sarcoma, Resection of Right Colon and Terminal Ileum  Anesthesia General with Block;  Surgeon: Nancy Arredondo MD;  [facility]     COMBINED CYSTOSCOPY, INSERT STENT URETER(S) Right 12/3/2014    Procedure: COMBINED CYSTOSCOPY, INSERT STENT URETER(S);  Surgeon: Mehdi Nicholson  MD Drew;  [facility]     ECTOPIC PREGNANCY SURGERY       ETHMOIDECTOMY       EXCISE MASS ABDOMEN N/A 12/3/2014    Procedure: EXCISE MASS ABDOMEN;  Surgeon: Sergio Haider MD;  [facility]     HERNIORRHAPHY INCISIONAL (LOCATION) N/A 12/3/2014    Procedure: HERNIORRHAPHY INCISIONAL (LOCATION);  Surgeon: Sergio Haider MD;  [facility]     IR PORT REMOVAL RIGHT  6/29/2022     RESECT TUMOR RETROPERITONEAL  1/28/2014    Procedure: RESECT TUMOR RETROPERITONEAL;;  Surgeon: Sergio Haider MD;  [facility]     TONSILLECTOMY       TUBAL LIGATION       VASCULAR SURGERY  10/08/2010    venous ablation Rt leg       Social History     Socioeconomic History     Marital status:      Spouse name: Not on file     Number of children: Not on file     Years of education: Not on file     Highest education level: Not on file   Occupational History     Not on file   Tobacco Use     Smoking status: Never     Smokeless tobacco: Never   Vaping Use     Vaping Use: Never used   Substance and Sexual Activity     Alcohol use: Yes     Comment: seldom     Drug use: No     Sexual activity: Not Currently     Partners: Male     Birth control/protection: Post-menopausal   Other Topics Concern     Parent/sibling w/ CABG, MI or angioplasty before 65F 55M? Yes     Comment: sister had mesenteric artery bypass and recently passed away   Social History Narrative     Not on file     Social Determinants of Health     Financial Resource Strain: Not on file   Food Insecurity: Not on file   Transportation Needs: Not on file   Physical Activity: Not on file   Stress: Not on file   Social Connections: Not on file   Intimate Partner Violence: Not on file   Housing Stability: Not on file       Family History   Problem Relation Age of Onset     Diabetes Sister      Coronary Artery Disease Father      Cerebrovascular Disease Father      Depression Sister      Obesity Sister      Hyperlipidemia Mother      Substance Abuse Son   "      ROS    Allergies   Allergen Reactions     Contrast Dye Hives and Unknown     Patient has prescription for medrol to take prior to scan     Kytril [Granisetron] Other (See Comments)     Kytril causes severe headaches.     No Clinical Screening - See Comments Rash     Ondansetron Other (See Comments) and Unknown     Zofran may cause headaches.       Current Outpatient Medications   Medication     acyclovir (ZOVIRAX) 5 % external ointment     blood glucose (NO BRAND SPECIFIED) lancets standard     blood glucose (NO BRAND SPECIFIED) test strip     blood glucose monitoring (NO BRAND SPECIFIED) meter device kit     Coloplast barrier cream CREA     gabapentin (NEURONTIN) 300 MG capsule     levothyroxine (EUTHYROX) 75 MCG tablet     meclizine (ANTIVERT) 25 MG tablet     nitroFURantoin macrocrystal-monohydrate (MACROBID) 100 MG capsule     omeprazole (PRILOSEC) 40 MG DR capsule     tiZANidine (ZANAFLEX) 4 MG tablet     UNABLE TO FIND     valACYclovir (VALTREX) 500 MG tablet     No current facility-administered medications for this visit.     Facility-Administered Medications Ordered in Other Visits   Medication     heparin 100 UNIT/ML injection 500 Units       /69   Pulse 95   Ht 1.74 m (5' 8.5\")   Wt 73.5 kg (162 lb)   BMI 24.27 kg/m   No LMP recorded. Patient is postmenopausal. Body mass index is 24.27 kg/m .  Ms. Hewitt is alert, comfortable in no acute distress, non-labored breathing.   Abdomen is soft, non-tender, non-distended, no CVAT.    Normal external female genitalia. The urethra had a small caruncle, o/w WNL.    She has goo support on supine strain.  Speculum and bimanual exam are remarkable for atrophic vaginal epithelium.        Urine dip WNL    A/P: Shiela Hewitt is a 67 year old F with h/o hematuria and 3 UTIs in the past 4 months    Start macrobid prophylaxis. Estrace cream twice weekly. Schedule cystoscopy. Shiela had a CT of the chest, abdoman and pelvis in 6/22 that was WNL.    I spent a " total of 45 minutes with  Ms. Hewitt  on the date of the encounter in chart review, face to face patient visit, review of tests, documentation and/or discussion with other providers about the issues documented above.    Lang Davis MD  Professor, OB/GYN  Urogynecologist  CC  Patient Care Team:  Mindy Vieira DO as PCP - General (Family Practice)  Sergio Haider MD as MD (General Surgery)  Rob Wiseman MD as MD (Hematology & Oncology)  Mindy Vieira DO as Assigned PCP  Pearl Alberto RN as Nurse Coordinator (Oncology)  Rob Wiseman MD as Assigned Cancer Care Provider  SURENDRA Mason MD as MD (Cardiovascular Disease)  SURENDRA Mason MD as Assigned Heart and Vascular Provider  PATI LYNN

## 2022-11-29 ENCOUNTER — TRANSFERRED RECORDS (OUTPATIENT)
Dept: MULTI SPECIALTY CLINIC | Facility: CLINIC | Age: 67
End: 2022-11-29

## 2022-11-29 LAB — RETINOPATHY: NORMAL

## 2022-11-30 DIAGNOSIS — C49.9 SARCOMA OF SOFT TISSUE (H): ICD-10-CM

## 2022-11-30 RX ORDER — METHYLPREDNISOLONE 32 MG/1
32 TABLET ORAL DAILY
Qty: 2 TABLET | Refills: 4 | Status: SHIPPED | OUTPATIENT
Start: 2022-11-30 | End: 2023-12-03

## 2022-11-30 NOTE — TELEPHONE ENCOUNTER
Methylprednisone refill   Last prescribing provider: Dr Wiseman     Last clinic visit date: 6/14/22 Dr Wiseman     Recommendations for requested medication (if none, N/A): Needs for CT scan on Friday     Any other pertinent information (if none, N/A): N/A    Refilled: Y/N, if NO, why?

## 2022-12-02 ENCOUNTER — LAB (OUTPATIENT)
Dept: LAB | Facility: CLINIC | Age: 67
End: 2022-12-02
Attending: INTERNAL MEDICINE
Payer: MEDICARE

## 2022-12-02 ENCOUNTER — ANCILLARY PROCEDURE (OUTPATIENT)
Dept: CT IMAGING | Facility: CLINIC | Age: 67
End: 2022-12-02
Attending: INTERNAL MEDICINE
Payer: MEDICARE

## 2022-12-02 VITALS
WEIGHT: 165.1 LBS | HEART RATE: 95 BPM | BODY MASS INDEX: 24.74 KG/M2 | RESPIRATION RATE: 16 BRPM | OXYGEN SATURATION: 97 % | DIASTOLIC BLOOD PRESSURE: 72 MMHG | SYSTOLIC BLOOD PRESSURE: 129 MMHG | TEMPERATURE: 97.6 F

## 2022-12-02 DIAGNOSIS — E11.9 TYPE 2 DIABETES MELLITUS WITHOUT COMPLICATION, WITHOUT LONG-TERM CURRENT USE OF INSULIN (H): ICD-10-CM

## 2022-12-02 DIAGNOSIS — R73.09 ELEVATED GLUCOSE: ICD-10-CM

## 2022-12-02 DIAGNOSIS — C49.9 SARCOMA OF SOFT TISSUE (H): ICD-10-CM

## 2022-12-02 DIAGNOSIS — Z13.220 LIPID SCREENING: ICD-10-CM

## 2022-12-02 DIAGNOSIS — D68.61 ANTIPHOSPHOLIPID ANTIBODY SYNDROME (H): ICD-10-CM

## 2022-12-02 DIAGNOSIS — Z91.041 RADIOGRAPHIC DYE ALLERGY STATUS: ICD-10-CM

## 2022-12-02 DIAGNOSIS — Z11.4 SCREENING FOR HIV (HUMAN IMMUNODEFICIENCY VIRUS): ICD-10-CM

## 2022-12-02 DIAGNOSIS — E03.4 HYPOTHYROIDISM DUE TO ACQUIRED ATROPHY OF THYROID: ICD-10-CM

## 2022-12-02 DIAGNOSIS — Z91.041 CONTRAST MEDIA ALLERGY: ICD-10-CM

## 2022-12-02 DIAGNOSIS — R79.89 ELEVATED SERUM CREATININE: ICD-10-CM

## 2022-12-02 LAB
ACANTHOCYTES BLD QL SMEAR: SLIGHT
ALBUMIN SERPL BCG-MCNC: 4.8 G/DL (ref 3.5–5.2)
ALP SERPL-CCNC: 75 U/L (ref 35–104)
ALT SERPL W P-5'-P-CCNC: 18 U/L (ref 10–35)
ANION GAP SERPL CALCULATED.3IONS-SCNC: 12 MMOL/L (ref 7–15)
AST SERPL W P-5'-P-CCNC: 22 U/L (ref 10–35)
BASOPHILS # BLD MANUAL: 0 10E3/UL (ref 0–0.2)
BASOPHILS NFR BLD MANUAL: 0 %
BILIRUB SERPL-MCNC: 0.6 MG/DL
BUN SERPL-MCNC: 19.8 MG/DL (ref 8–23)
BURR CELLS BLD QL SMEAR: SLIGHT
CALCIUM SERPL-MCNC: 9.9 MG/DL (ref 8.8–10.2)
CHLORIDE SERPL-SCNC: 103 MMOL/L (ref 98–107)
CREAT BLD-MCNC: 1 MG/DL (ref 0.5–1)
CREAT SERPL-MCNC: 0.95 MG/DL (ref 0.51–0.95)
DEPRECATED HCO3 PLAS-SCNC: 25 MMOL/L (ref 22–29)
EOSINOPHIL # BLD MANUAL: 0 10E3/UL (ref 0–0.7)
EOSINOPHIL NFR BLD MANUAL: 0 %
ERYTHROCYTE [DISTWIDTH] IN BLOOD BY AUTOMATED COUNT: 13.1 % (ref 10–15)
GFR SERPL CREATININE-BSD FRML MDRD: 66 ML/MIN/1.73M2
GFR SERPL CREATININE-BSD FRML MDRD: >60 ML/MIN/1.73M2
GLUCOSE SERPL-MCNC: 139 MG/DL (ref 70–99)
HCT VFR BLD AUTO: 41.8 % (ref 35–47)
HGB BLD-MCNC: 14 G/DL (ref 11.7–15.7)
LYMPHOCYTES # BLD MANUAL: 0.9 10E3/UL (ref 0.8–5.3)
LYMPHOCYTES NFR BLD MANUAL: 14 %
MCH RBC QN AUTO: 33.1 PG (ref 26.5–33)
MCHC RBC AUTO-ENTMCNC: 33.5 G/DL (ref 31.5–36.5)
MCV RBC AUTO: 99 FL (ref 78–100)
MONOCYTES # BLD MANUAL: 0.1 10E3/UL (ref 0–1.3)
MONOCYTES NFR BLD MANUAL: 1 %
NEUTROPHILS # BLD MANUAL: 5.5 10E3/UL (ref 1.6–8.3)
NEUTROPHILS NFR BLD MANUAL: 85 %
PLAT MORPH BLD: ABNORMAL
PLATELET # BLD AUTO: 221 10E3/UL (ref 150–450)
POTASSIUM SERPL-SCNC: 3.7 MMOL/L (ref 3.4–5.3)
PROT SERPL-MCNC: 7.6 G/DL (ref 6.4–8.3)
RBC # BLD AUTO: 4.23 10E6/UL (ref 3.8–5.2)
RBC MORPH BLD: ABNORMAL
SODIUM SERPL-SCNC: 140 MMOL/L (ref 136–145)
WBC # BLD AUTO: 6.5 10E3/UL (ref 4–11)

## 2022-12-02 PROCEDURE — 80053 COMPREHEN METABOLIC PANEL: CPT

## 2022-12-02 PROCEDURE — 80053 COMPREHEN METABOLIC PANEL: CPT | Performed by: PATHOLOGY

## 2022-12-02 PROCEDURE — 82040 ASSAY OF SERUM ALBUMIN: CPT

## 2022-12-02 PROCEDURE — 85007 BL SMEAR W/DIFF WBC COUNT: CPT

## 2022-12-02 PROCEDURE — 999N000248 HC STATISTIC IV INSERT WITH US BY RN

## 2022-12-02 PROCEDURE — 999N000285 HC STATISTIC VASC ACCESS LAB DRAW WITH PIV START

## 2022-12-02 PROCEDURE — 74177 CT ABD & PELVIS W/CONTRAST: CPT | Performed by: RADIOLOGY

## 2022-12-02 PROCEDURE — 36415 COLL VENOUS BLD VENIPUNCTURE: CPT

## 2022-12-02 PROCEDURE — 71260 CT THORAX DX C+: CPT | Performed by: RADIOLOGY

## 2022-12-02 PROCEDURE — 85027 COMPLETE CBC AUTOMATED: CPT

## 2022-12-02 RX ORDER — IOPAMIDOL 755 MG/ML
91 INJECTION, SOLUTION INTRAVASCULAR ONCE
Status: COMPLETED | OUTPATIENT
Start: 2022-12-02 | End: 2022-12-02

## 2022-12-02 RX ADMIN — IOPAMIDOL 91 ML: 755 INJECTION, SOLUTION INTRAVASCULAR at 10:17

## 2022-12-02 ASSESSMENT — PAIN SCALES - GENERAL: PAINLEVEL: MILD PAIN (2)

## 2022-12-02 NOTE — NURSING NOTE
Chief Complaint   Patient presents with     Blood Draw     Vitals taken, 20 g PIV started by [facility] w/ US, labs drawn, saline locked, sent to CT to check in     /72 (BP Location: Left arm, Patient Position: Sitting, Cuff Size: Adult Large)   Pulse 95   Temp 97.6  F (36.4  C) (Oral)   Resp 16   Wt 74.9 kg (165 lb 1.6 oz)   SpO2 97%   BMI 24.74 kg/m    Julio César Motta RN on 12/2/2022 at 9:36 AM

## 2022-12-05 NOTE — PROGRESS NOTES
Shiela is a 67 year old who is being evaluated via a billable video visit.      Pt has discomfort on right side of her kidney area.     How would you like to obtain your AVS? MyChart  If the video visit is dropped, the invitation should be resent by: Text to cell phone: [phone]  Will anyone else be joining your video visit? No    Lisha Kerr VF    Video-Visit Details    Video Start Time:228    Type of service:  Video Visit    Video End Timeabout 247    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   -  Background  In brief, she noted an abdominal mass at the end of 2013, which was quite large and was resected on 01/28/2014. This tumor was about 26 cm in greatest diameter, high-grade, with necrosis present. In retrospect, she had had a CT scan done about 2 years before that, which was felt to be negative. In 08/2014, repeat imaging revealed a 3.5 cm mass near the psoas, and she received preoperative radiation therapy for this. 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 A new margin was obtained, but this was still positive for liposarcoma. All visible tumor was removed.      A recurrence was noted in the abd and she began lipodox/ifos about 3-19-15 and had 6 cycles; the last in August 2015.  -  She was admitted for partial SBO earlier in 2018.  -  She had a normal stress echo 8-31-21. She also had a normal CT angio.  -        Interval history     She is doing fairly well overall.  She is going to the gym and doing some weightlifting riding the bike and using the treadmill.  She is also walking the dog.  She does note a little bit of very mild sciatica on the right sometimes and the neuropathy does bother her feet.  Right now she is taking gabapentin at bedtime but she has foot problems before she  falls asleep so I suggested trying to take it 2 hours before.  We also discussed possibly increasing the dose as she was on a higher dose in the past.    They will be going back to [place] at the end of the month.    She had some hematuria in oct in [place] and this happened twice.    She had 2 UTIs in August and was put on Macrobid daily.  She saw urology who felt this might be due to the previous radiation but she will be getting cystoscopy on December 23.    She has some weakness in the right leg , stable, and if that does not affect her life.     She does have chronic diarrhea that depends on what she eats; still an issue. She has a history of gi issues; no imodium now but might consider trying it again; shes concerned about getting another SBO.   About 5-12 BMs /d.  She had some colitis episodes in the past.     She has occ GERD; on omeprazole about q 2-3 d.      Vertigo is OK now.  In June 2018 she had several episodes of vertigo and developed noise in her R ear requiring hospitalization.  She is not having vertigo now but has R hearing loss and now has hearing aids and that's working.    Occ sad re loss of mom and sister a year ago.    She got both covid shots and booster.     She has a longstanding history of hypothyroidism on replacement T4, and some seasonal allergies. She was also noted to have an antiphospholipid antibody. This was checked because her sister presented with a mesenteric artery clot.         -  Background PMH, FH, SH  PAST MEDICAL HISTORY: She did have a tonsillectomy at age 15 and some sinus surgery in about 1994 along with a tubal pregnancy and a tubal ligation.   ALLERGIES: She does describe 1 hive after IV contrast material about 20 years ago, so she has been taking prednisone and Benadryl for contrast scans. She does have some itching with Dilaudid, but still uses it.   SOCIAL HISTORY: She is a never smoker and does not abuse alcohol or other drugs. 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  -          On exam she appeared comfortable with normal affect.   HEENT full EOMs  NECK no obvious goiter or mass  CHEST no resp distress  NEURO  normal mentation and speech   PSYCH Mood good.  MSK good neck movement    -  CBC, LFT, GNE OK w creat 0.95, Hb 14.0,   glu 139     -   I reviewed her new CT images of 12-2-22 and there is no clear recurrence.  She does have some small lung nodules stable since 2016.    Formal read:  CT-CAP 12-2-22  IMPRESSION:  1.  No evidence of metastatic disease in the chest, abdomen, or pelvis. No significant change from previous.    -  A/P   We discussed a number of issues.      1-sarcoma. abd dedifferentiated liposarcomq.  CHEIKH now.       She is almost >7 years out from last chemotherapy (August 2015).    We will restage about 6-8 and see her 6-13     Contrast allergy requires methylpred; She has steroid Rx for the next scan and takes that.     1a-intermittent hematuria  On macrobid and gets cysto w urol 12-23  Following w PCP and now on macrobid and estrace cream       2-elevated FBS in past  OK now  Creat is fairly stable.  She will f/u w PCP.     3-neuropathy  She will use gabapentin as needed-may increase dose     5-GERD  She will use prilosec prn now     6-Diarrhea  Stable, f/u w PCP     7-hypothyroidisim, hearing loss  Stable f/u w PCP    All questions were addressed and she will call if other questions arise.      Rob Wiseman M.D.  Professor  Hematology, Oncology and Transplantation

## 2022-12-06 ENCOUNTER — VIRTUAL VISIT (OUTPATIENT)
Dept: ONCOLOGY | Facility: CLINIC | Age: 67
End: 2022-12-06
Attending: INTERNAL MEDICINE
Payer: MEDICARE

## 2022-12-06 DIAGNOSIS — K21.9 GASTROESOPHAGEAL REFLUX DISEASE WITHOUT ESOPHAGITIS: ICD-10-CM

## 2022-12-06 DIAGNOSIS — C49.9 SARCOMA OF SOFT TISSUE (H): Primary | ICD-10-CM

## 2022-12-06 DIAGNOSIS — E03.4 HYPOTHYROIDISM DUE TO ACQUIRED ATROPHY OF THYROID: ICD-10-CM

## 2022-12-06 PROCEDURE — 99214 OFFICE O/P EST MOD 30 MIN: CPT | Mod: 95 | Performed by: INTERNAL MEDICINE

## 2022-12-06 NOTE — LETTER
12/6/2022         RE: Shiela Hewitt  [address] [facility]   [facility] [address]        Dear Colleague,    Thank you for referring your patient, Shiela Hewitt, to the [facility]. Please see a copy of my visit note below.    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   -  Background  In brief, she noted an abdominal mass at the end of 2013, which was quite large and was resected on 01/28/2014. This tumor was about 26 cm in greatest diameter, high-grade, with necrosis present. In retrospect, she had had a CT scan done about 2 years before that, which was felt to be negative. In 08/2014, repeat imaging revealed a 3.5 cm mass near the psoas, and she received preoperative radiation therapy for this. 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 A new margin was obtained, but this was still positive for liposarcoma. All visible tumor was removed.      A recurrence was noted in the abd and she began lipodox/ifos about 3-19-15 and had 6 cycles; the last in August 2015.  -  She was admitted for partial SBO earlier in 2018.  -  She had a normal stress echo 8-31-21. She also had a normal CT angio.  -        Interval history     She is doing fairly well overall.  She is going to the gym and doing some weightlifting riding the bike and using the treadmill.  She is also walking the dog.  She does note a little bit of very mild sciatica on the right sometimes and the neuropathy does bother her feet.  Right now she is taking gabapentin at bedtime but she has foot problems before she falls asleep so I suggested trying to take it 2 hours before.  We also discussed possibly increasing the dose as she was on a higher dose in the past.    They will be going back to [place] at the end of the month.    She had some hematuria in oct in [place] and this happened twice.    She had 2 UTIs in August  and was put on Macrobid daily.  She saw urology who felt this might be due to the previous radiation but she will be getting cystoscopy on December 23.    She has some weakness in the right leg , stable, and if that does not affect her life.     She does have chronic diarrhea that depends on what she eats; still an issue. She has a history of gi issues; no imodium now but might consider trying it again; shes concerned about getting another SBO.   About 5-12 BMs /d.  She had some colitis episodes in the past.     She has occ GERD; on omeprazole about q 2-3 d.      Vertigo is OK now.  In June 2018 she had several episodes of vertigo and developed noise in her R ear requiring hospitalization.  She is not having vertigo now but has R hearing loss and now has hearing aids and that's working.    Occ sad re loss of mom and sister a year ago.    She got both covid shots and booster.     She has a longstanding history of hypothyroidism on replacement T4, and some seasonal allergies. She was also noted to have an antiphospholipid antibody. This was checked because her sister presented with a mesenteric artery clot.         -  Background PMH, FH, SH  PAST MEDICAL HISTORY: She did have a tonsillectomy at age 15 and some sinus surgery in about 1994 along with a tubal pregnancy and a tubal ligation.   ALLERGIES: She does describe 1 hive after IV contrast material about 20 years ago, so she has been taking prednisone and Benadryl for contrast scans. She does have some itching with Dilaudid, but still uses it.   SOCIAL HISTORY: She is a never smoker and does not abuse alcohol or other drugs. 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  -          On exam she appeared comfortable with normal affect.   HEENT full EOMs  NECK no obvious goiter or mass  CHEST no resp distress  NEURO  normal mentation and speech   PSYCH Mood good.  MSK good neck movement    -  CBC, LFT, GNE OK w creat 0.95,  Hb 14.0,   glu 139     -   I reviewed her new CT images of 12-2-22 and there is no clear recurrence.  She does have some small lung nodules stable since 2016.    Formal read:  CT-CAP 12-2-22  IMPRESSION:  1.  No evidence of metastatic disease in the chest, abdomen, or pelvis. No significant change from previous.    -  A/P   We discussed a number of issues.      1-sarcoma. abd dedifferentiated liposarcomq.  CHEIKH now.       She is almost >7 years out from last chemotherapy (August 2015).    We will restage about 6-8 and see her 6-13     Contrast allergy requires methylpred; She has steroid Rx for the next scan and takes that.     1a-intermittent hematuria  On macrobid and gets cysto w urol 12-23  Following w PCP and now on macrobid and estrace cream       2-elevated FBS in past  OK now  Creat is fairly stable.  She will f/u w PCP.     3-neuropathy  She will use gabapentin as needed-may increase dose     5-GERD  She will use prilosec prn now     6-Diarrhea  Stable, f/u w PCP     7-hypothyroidisim, hearing loss  Stable f/u w PCP    All questions were addressed and she will call if other questions arise.        oRb Wiseman M.D.  Professor  Hematology, Oncology and Transplantation

## 2022-12-08 ENCOUNTER — PRE VISIT (OUTPATIENT)
Dept: UROLOGY | Facility: CLINIC | Age: 67
End: 2022-12-08

## 2022-12-08 NOTE — TELEPHONE ENCOUNTER
Reason for visit: Cystoscopy     Relevant information: Hx of hematuria and recurrent UTI    Records/imaging/labs/orders: All records available    Pt called: Send [employer] message about cysto    At Rooming: Urine dip

## 2022-12-23 ENCOUNTER — OFFICE VISIT (OUTPATIENT)
Dept: UROLOGY | Facility: CLINIC | Age: 67
End: 2022-12-23
Payer: MEDICARE

## 2022-12-23 VITALS
DIASTOLIC BLOOD PRESSURE: 75 MMHG | WEIGHT: 162 LBS | BODY MASS INDEX: 23.99 KG/M2 | SYSTOLIC BLOOD PRESSURE: 121 MMHG | HEART RATE: 67 BPM | HEIGHT: 69 IN

## 2022-12-23 DIAGNOSIS — R31.0 GROSS HEMATURIA: Primary | ICD-10-CM

## 2022-12-23 DIAGNOSIS — R31.9 HEMATURIA, UNSPECIFIED TYPE: ICD-10-CM

## 2022-12-23 DIAGNOSIS — N39.0 RECURRENT UTI: ICD-10-CM

## 2022-12-23 LAB
ALBUMIN UR-MCNC: NEGATIVE MG/DL
APPEARANCE UR: CLEAR
BILIRUB UR QL STRIP: NEGATIVE
COLOR UR AUTO: YELLOW
GLUCOSE UR STRIP-MCNC: NEGATIVE MG/DL
HGB UR QL STRIP: NEGATIVE
KETONES UR STRIP-MCNC: NEGATIVE MG/DL
LEUKOCYTE ESTERASE UR QL STRIP: NEGATIVE
NITRATE UR QL: NEGATIVE
PH UR STRIP: 5.5 [PH] (ref 5–8)
SP GR UR STRIP: 1.01 (ref 1–1.03)
UROBILINOGEN UR STRIP-ACNC: 0.2 E.U./DL

## 2022-12-23 PROCEDURE — 81003 URINALYSIS AUTO W/O SCOPE: CPT | Performed by: PATHOLOGY

## 2022-12-23 PROCEDURE — 52000 CYSTOURETHROSCOPY: CPT | Performed by: OBSTETRICS & GYNECOLOGY

## 2022-12-23 RX ORDER — LIDOCAINE HYDROCHLORIDE 20 MG/ML
JELLY TOPICAL ONCE
Status: COMPLETED | OUTPATIENT
Start: 2022-12-23 | End: 2022-12-23

## 2022-12-23 RX ADMIN — LIDOCAINE HYDROCHLORIDE: 20 JELLY TOPICAL at 14:01

## 2022-12-23 ASSESSMENT — PAIN SCALES - GENERAL: PAINLEVEL: NO PAIN (0)

## 2022-12-23 NOTE — PROGRESS NOTES
"Chief Complaint   Patient presents with     Cystoscopy       Blood pressure 121/75, pulse 67, height 1.74 m (5' 8.5\"), weight 73.5 kg (162 lb). Body mass index is 24.27 kg/m .    Patient Active Problem List   Diagnosis     Liposarcoma (H)     Kaposi's sarcoma (H)     Hypothyroidism     Antiphospholipid antibody syndrome (H)     BPPV (benign paroxysmal positional vertigo)     Esophageal reflux     History of herpes genitalis     Displacement of lumbar intervertebral disc without myelopathy     Anemia in neoplastic disease       Allergies   Allergen Reactions     Contrast Dye Hives and Unknown     Patient has prescription for medrol to take prior to scan     Kytril [Granisetron] Other (See Comments)     Kytril causes severe headaches.     No Clinical Screening - See Comments Rash     Ondansetron Other (See Comments) and Unknown     Zofran may cause headaches.       Current Outpatient Medications   Medication Sig Dispense Refill     acyclovir (ZOVIRAX) 5 % external ointment 1 application to affected area       blood glucose (NO BRAND SPECIFIED) lancets standard Use to test blood sugar 1 times daily 100 each 3     blood glucose (NO BRAND SPECIFIED) test strip Use to test blood sugar 1 times daily 100 strip 3     blood glucose monitoring (NO BRAND SPECIFIED) meter device kit Use to test blood sugar 1 times daily 1 kit 0     Coloplast barrier cream CREA Apply 5 g topically 2 times daily 100 g 3     estradiol (ESTRACE) 0.1 MG/GM vaginal cream Place 1 g vaginally twice a week 42.5 g 11     gabapentin (NEURONTIN) 300 MG capsule Take 1 capsule (300 mg) by mouth 2 times daily 180 capsule 3     levothyroxine (EUTHYROX) 75 MCG tablet Take 1 tablet (75 mcg) by mouth daily 90 tablet 3     meclizine (ANTIVERT) 25 MG tablet 25 mg 3 times daily as needed       methylPREDNISolone (MEDROL) 32 MG tablet Take 1 tablet (32 mg) by mouth daily Take one tablet 12 hours prior to scan, then take another tablet 2 hours prior to scan 2 tablet 4 "     nitroFURantoin macrocrystal-monohydrate (MACROBID) 100 MG capsule Take 1 capsule (100 mg) by mouth daily (Patient not taking: Reported on 2022) 90 capsule 1     nitroFURantoin macrocrystal-monohydrate (MACROBID) 100 MG capsule Take 1 capsule (100 mg) by mouth 2 times daily 14 capsule 0     omeprazole (PRILOSEC) 40 MG DR capsule Take 1 capsule (40 mg) by mouth daily 90 capsule 3     tiZANidine (ZANAFLEX) 4 MG tablet Take 1 tablet by mouth as needed       UNABLE TO FIND Apply 1 drop to eye        valACYclovir (VALTREX) 500 MG tablet Take 500 mg by mouth as needed         Social History     Tobacco Use     Smoking status: Never     Smokeless tobacco: Never   Vaping Use     Vaping Use: Never used   Substance Use Topics     Alcohol use: Yes     Comment: seldom     Drug use: No       Invasive Procedure Safety Checklist:    Procedure: Cystoscopy    Action: Complete sections and checkboxes as appropriate.    Pre-procedure:  1. Patient ID Verified with 2 identifiers (Chanda and  or MRN) : YES    2. Procedure and site verified with patient/designee (when able) : YES    3. Accurate consent documentation in medical record : YES    4. H&P (or appropriate assessment) documented in medical record : N/A  H&P must be up to 30 days prior to procedure an updated within 24 hours of                 Procedure as applicable.     5. Relevant diagnostic and radiology test results appropriately labeled and displayed as applicable : YES    6. Blood products, implants, devices, and/or special equipment available for the procedure as applicable : YES    7. Procedure site(s) marked with provider initials [Exclusions: none] : NO    8. Marking not required. Reason : Yes  Procedure does not require site marking    Time Out:     Time-Out performed immediately prior to starting procedure, including verbal and active participation of all team members addressing: YES    1. Correct patient identity.  2. Confirmed that the correct side and site  are marked.  3. An accurate procedure to be done.  4. Agreement on the procedure to be done.  5. Correct patient position.  6. Relevant images and results are properly labeled and appropriately displayed.  7. The need to administer antibiotics or fluids for irrigation purposes during the procedure as applicable.  8. Safety precautions based on patient history or medication use.    During Procedure: Verification of correct person, site, and procedure occurs any time the responsibility for care of the patient is transferred to another member of the care team.      The following medication was given:     MEDICATION:  Lidocaine without epinephrine 2% jelly  ROUTE: urethral   SITE: urethral   DOSE: 10 mL  LOT #: [id number]  : [employer]  EXPIRATION DATE: 7-24  NDC#: [address]-0  Was there drug waste? No    Prior to med admin, verified patient identity using patient's name and date of birth.  Due to med administration, patient instructed to remain in clinic for 15 minutes  afterwards, and to report any adverse reaction to me immediately.    Drug Amount Wasted:  None.  Vial/Syringe: Syringe            Margarita Gregory  12/23/2022

## 2022-12-23 NOTE — PATIENT INSTRUCTIONS
"Follow-up with Dr. Davis when you are back from [place] in May.    It was a pleasure meeting with you today.  Thank you for allowing me and my team the privilege of caring for you today.  YOU are the reason we are here, and I truly hope we provided you with the excellent service you deserve.  Please let us know if there is anything else we can do for you so that we can be sure you are leaving completely satisfied with your care experience.        AFTER YOUR CYSTOSCOPY        You have just completed a cystoscopy, or \"cysto\", which allowed your physician to learn more about your bladder (or to remove a stent placed after surgery). We suggest that you continue to avoid caffeine, fruit juice, and alcohol for the next 24 hours, however, you are encouraged to return to your normal activities.         A few things that are considered normal after your cystoscopy:     * Small amount of bleeding (or spotting) that clears within the next 24 hours     * Slight burning sensation with urination     * Sensation to of needing to avoid more frequently     * The feeling of \"air\" in your urine     * Mild discomfort that is relieved with Tylenol        Please contact our office promptly if you:     * Develop a fever above 101 degrees     * Are unable to urinate     * Develop bright red blood that does not stop     * Severe pain or swelling         Please contact our office with any concerns or questions @[email or web address].  "

## 2022-12-23 NOTE — PROGRESS NOTES
Reason for Visit:  Cystoscopy    Clinical Data: Ms. Shiela Hewitt is a 67 year old female with a hx of recurrent UTI and hematuria    Cystoscopy procedure:  Pt. Was consented and placed in the lithotomy position.  She was cleaned and preparred in the usual fashion.  Lidocain gel was inserted into the urethra and given time to take effect.  A 16 fr flexible cystoscope was then inserted through the urethra and into the bladder.  The urethra was wnl.  The bladder was with 1+ trabeculation.  No tumors, diverticulae, or stones.  Bilateral u/o's were effluxing clear urine.  The cystoscope was then withdrawn.  The pt. Tolerated the procedure well.    A/P:  67 year old female with recurrent UTI and hematuria  -F/U in 5 months    Thank you for allowing me to participate in the care of  Ms. Shiela Hewitt and I will keep you updated on her progress.    Lang Davis MD

## 2022-12-23 NOTE — LETTER
"12/23/2022       RE: Shiela Hewitt  [address]   [place] [address]     Dear Colleague,    Thank you for referring your patient, Shiela Hewitt, to the [facility] [place] [facility]. Please see a copy of my visit note below.    Chief Complaint   Patient presents with     Cystoscopy       Blood pressure 121/75, pulse 67, height 1.74 m (5' 8.5\"), weight 73.5 kg (162 lb). Body mass index is 24.27 kg/m .    Patient Active Problem List   Diagnosis     Liposarcoma (H)     Kaposi's sarcoma (H)     Hypothyroidism     Antiphospholipid antibody syndrome (H)     BPPV (benign paroxysmal positional vertigo)     Esophageal reflux     History of herpes genitalis     Displacement of lumbar intervertebral disc without myelopathy     Anemia in neoplastic disease       Allergies   Allergen Reactions     Contrast Dye Hives and Unknown     Patient has prescription for medrol to take prior to scan     Kytril [Granisetron] Other (See Comments)     Kytril causes severe headaches.     No Clinical Screening - See Comments Rash     Ondansetron Other (See Comments) and Unknown     Zofran may cause headaches.       Current Outpatient Medications   Medication Sig Dispense Refill     acyclovir (ZOVIRAX) 5 % external ointment 1 application to affected area       blood glucose (NO BRAND SPECIFIED) lancets standard Use to test blood sugar 1 times daily 100 each 3     blood glucose (NO BRAND SPECIFIED) test strip Use to test blood sugar 1 times daily 100 strip 3     blood glucose monitoring (NO BRAND SPECIFIED) meter device kit Use to test blood sugar 1 times daily 1 kit 0     Coloplast barrier cream CREA Apply 5 g topically 2 times daily 100 g 3     estradiol (ESTRACE) 0.1 MG/GM vaginal cream Place 1 g vaginally twice a week 42.5 g 11     gabapentin (NEURONTIN) 300 MG capsule Take 1 capsule (300 mg) by mouth 2 times daily 180 capsule 3     levothyroxine (EUTHYROX) " 75 MCG tablet Take 1 tablet (75 mcg) by mouth daily 90 tablet 3     meclizine (ANTIVERT) 25 MG tablet 25 mg 3 times daily as needed       methylPREDNISolone (MEDROL) 32 MG tablet Take 1 tablet (32 mg) by mouth daily Take one tablet 12 hours prior to scan, then take another tablet 2 hours prior to scan 2 tablet 4     nitroFURantoin macrocrystal-monohydrate (MACROBID) 100 MG capsule Take 1 capsule (100 mg) by mouth daily (Patient not taking: Reported on 2022) 90 capsule 1     nitroFURantoin macrocrystal-monohydrate (MACROBID) 100 MG capsule Take 1 capsule (100 mg) by mouth 2 times daily 14 capsule 0     omeprazole (PRILOSEC) 40 MG DR capsule Take 1 capsule (40 mg) by mouth daily 90 capsule 3     tiZANidine (ZANAFLEX) 4 MG tablet Take 1 tablet by mouth as needed       UNABLE TO FIND Apply 1 drop to eye        valACYclovir (VALTREX) 500 MG tablet Take 500 mg by mouth as needed         Social History     Tobacco Use     Smoking status: Never     Smokeless tobacco: Never   Vaping Use     Vaping Use: Never used   Substance Use Topics     Alcohol use: Yes     Comment: seldom     Drug use: No       Invasive Procedure Safety Checklist:    Procedure: Cystoscopy    Action: Complete sections and checkboxes as appropriate.    Pre-procedure:  1. Patient ID Verified with 2 identifiers (Chanda and  or MRN) : YES    2. Procedure and site verified with patient/designee (when able) : YES    3. Accurate consent documentation in medical record : YES    4. H&P (or appropriate assessment) documented in medical record : N/A  H&P must be up to 30 days prior to procedure an updated within 24 hours of                 Procedure as applicable.     5. Relevant diagnostic and radiology test results appropriately labeled and displayed as applicable : YES    6. Blood products, implants, devices, and/or special equipment available for the procedure as applicable : YES    7. Procedure site(s) marked with provider initials [Exclusions: none] :  NO    8. Marking not required. Reason : Yes  Procedure does not require site marking    Time Out:     Time-Out performed immediately prior to starting procedure, including verbal and active participation of all team members addressing: YES    1. Correct patient identity.  2. Confirmed that the correct side and site are marked.  3. An accurate procedure to be done.  4. Agreement on the procedure to be done.  5. Correct patient position.  6. Relevant images and results are properly labeled and appropriately displayed.  7. The need to administer antibiotics or fluids for irrigation purposes during the procedure as applicable.  8. Safety precautions based on patient history or medication use.    During Procedure: Verification of correct person, site, and procedure occurs any time the responsibility for care of the patient is transferred to another member of the care team.      The following medication was given:     MEDICATION:  Lidocaine without epinephrine 2% jelly  ROUTE: urethral   SITE: urethral   DOSE: 10 mL  LOT #: [id number]  : [employer]  EXPIRATION DATE: 7-24  NDC#: [address]-5  Was there drug waste? No    Prior to med admin, verified patient identity using patient's name and date of birth.  Due to med administration, patient instructed to remain in clinic for 15 minutes  afterwards, and to report any adverse reaction to me immediately.    Drug Amount Wasted:  None.  Vial/Syringe: Syringe            Margarita Gregory  12/23/2022    Reason for Visit:  Cystoscopy    Clinical Data: Ms. Shiela Hewitt is a 67 year old female with a hx of recurrent UTI and hematuria    Cystoscopy procedure:  Pt. Was consented and placed in the lithotomy position.  She was cleaned and preparred in the usual fashion.  Lidocain gel was inserted into the urethra and given time to take effect.  A 16 fr flexible cystoscope was then inserted through the urethra and into the bladder.  The urethra was  wnl.  The bladder was with 1+ trabeculation.  No tumors, diverticulae, or stones.  Bilateral u/o's were effluxing clear urine.  The cystoscope was then withdrawn.  The pt. Tolerated the procedure well.    A/P:  67 year old female with recurrent UTI and hematuria  -F/U in 5 months    Thank you for allowing me to participate in the care of  Ms. Shiela Hewitt and I will keep you updated on her progress.    Lang Davis MD

## 2023-04-01 ENCOUNTER — HEALTH MAINTENANCE LETTER (OUTPATIENT)
Age: 68
End: 2023-04-01

## 2023-05-26 ENCOUNTER — TELEPHONE (OUTPATIENT)
Dept: FAMILY MEDICINE | Facility: CLINIC | Age: 68
End: 2023-05-26
Payer: MEDICARE

## 2023-05-26 DIAGNOSIS — Z13.220 LIPID SCREENING: ICD-10-CM

## 2023-05-26 DIAGNOSIS — E03.4 HYPOTHYROIDISM DUE TO ACQUIRED ATROPHY OF THYROID: Primary | ICD-10-CM

## 2023-05-26 DIAGNOSIS — Z13.1 SCREENING FOR DIABETES MELLITUS: ICD-10-CM

## 2023-05-26 NOTE — TELEPHONE ENCOUNTER
Patient called requesting lab order's in chart that is done during physical the stuff provider would order physical scheduled for august but patient is going to the  [facility] on 6/9/23 and would like to get labs done there.  Nadya Pisano.  Thank you!

## 2023-06-05 NOTE — TELEPHONE ENCOUNTER
Patient is calling to check on the status of her lab orders.  Patient is having her yearly physical with Dr Vieira in August but is needing her lab orders placed now (patient has to have an ultra sound to have blood work done). Please call patient once Dr Viiera has placed those lab orders. Patient is scheduled for this Friday and does not want there to be a problem.    RANDALL Alvarez  [facility]

## 2023-06-09 ENCOUNTER — LAB (OUTPATIENT)
Dept: LAB | Facility: CLINIC | Age: 68
End: 2023-06-09
Payer: MEDICARE

## 2023-06-09 ENCOUNTER — ANCILLARY PROCEDURE (OUTPATIENT)
Dept: CT IMAGING | Facility: CLINIC | Age: 68
End: 2023-06-09
Attending: INTERNAL MEDICINE
Payer: MEDICARE

## 2023-06-09 ENCOUNTER — TELEPHONE (OUTPATIENT)
Dept: FAMILY MEDICINE | Facility: CLINIC | Age: 68
End: 2023-06-09

## 2023-06-09 DIAGNOSIS — C49.9 SARCOMA OF SOFT TISSUE (H): ICD-10-CM

## 2023-06-09 DIAGNOSIS — E03.4 HYPOTHYROIDISM DUE TO ACQUIRED ATROPHY OF THYROID: ICD-10-CM

## 2023-06-09 DIAGNOSIS — Z13.1 SCREENING FOR DIABETES MELLITUS: ICD-10-CM

## 2023-06-09 DIAGNOSIS — K21.9 GASTROESOPHAGEAL REFLUX DISEASE WITHOUT ESOPHAGITIS: ICD-10-CM

## 2023-06-09 DIAGNOSIS — E11.9 TYPE 2 DIABETES MELLITUS WITHOUT COMPLICATION, WITHOUT LONG-TERM CURRENT USE OF INSULIN (H): Primary | ICD-10-CM

## 2023-06-09 DIAGNOSIS — E87.6 HYPOKALEMIA: Primary | ICD-10-CM

## 2023-06-09 DIAGNOSIS — Z13.220 LIPID SCREENING: ICD-10-CM

## 2023-06-09 LAB
ALBUMIN SERPL BCG-MCNC: 3.9 G/DL (ref 3.5–5.2)
ALP SERPL-CCNC: 57 U/L (ref 35–104)
ALT SERPL W P-5'-P-CCNC: 17 U/L (ref 10–35)
ANION GAP SERPL CALCULATED.3IONS-SCNC: 9 MMOL/L (ref 7–15)
AST SERPL W P-5'-P-CCNC: 21 U/L (ref 10–35)
BASOPHILS # BLD AUTO: 0 10E3/UL (ref 0–0.2)
BASOPHILS NFR BLD AUTO: 0 %
BILIRUB SERPL-MCNC: 0.4 MG/DL
BUN SERPL-MCNC: 20.2 MG/DL (ref 8–23)
CALCIUM SERPL-MCNC: 7.8 MG/DL (ref 8.8–10.2)
CHLORIDE SERPL-SCNC: 111 MMOL/L (ref 98–107)
CHOLEST SERPL-MCNC: 166 MG/DL
CREAT SERPL-MCNC: 0.88 MG/DL (ref 0.51–0.95)
DEPRECATED HCO3 PLAS-SCNC: 21 MMOL/L (ref 22–29)
EOSINOPHIL # BLD AUTO: 0 10E3/UL (ref 0–0.7)
EOSINOPHIL NFR BLD AUTO: 0 %
ERYTHROCYTE [DISTWIDTH] IN BLOOD BY AUTOMATED COUNT: 12.7 % (ref 10–15)
GFR SERPL CREATININE-BSD FRML MDRD: 72 ML/MIN/1.73M2
GLUCOSE SERPL-MCNC: 134 MG/DL (ref 70–99)
HBA1C MFR BLD: 5.4 %
HCT VFR BLD AUTO: 43.6 % (ref 35–47)
HDLC SERPL-MCNC: 89 MG/DL
HGB BLD-MCNC: 14.9 G/DL (ref 11.7–15.7)
IMM GRANULOCYTES # BLD: 0 10E3/UL
IMM GRANULOCYTES NFR BLD: 0 %
LDLC SERPL CALC-MCNC: 66 MG/DL
LYMPHOCYTES # BLD AUTO: 0.8 10E3/UL (ref 0.8–5.3)
LYMPHOCYTES NFR BLD AUTO: 14 %
MCH RBC QN AUTO: 33.2 PG (ref 26.5–33)
MCHC RBC AUTO-ENTMCNC: 34.2 G/DL (ref 31.5–36.5)
MCV RBC AUTO: 97 FL (ref 78–100)
MONOCYTES # BLD AUTO: 0.1 10E3/UL (ref 0–1.3)
MONOCYTES NFR BLD AUTO: 1 %
NEUTROPHILS # BLD AUTO: 5.1 10E3/UL (ref 1.6–8.3)
NEUTROPHILS NFR BLD AUTO: 85 %
NONHDLC SERPL-MCNC: 77 MG/DL
NRBC # BLD AUTO: 0 10E3/UL
NRBC BLD AUTO-RTO: 0 /100
PLATELET # BLD AUTO: 223 10E3/UL (ref 150–450)
POTASSIUM SERPL-SCNC: 2.9 MMOL/L (ref 3.4–5.3)
PROT SERPL-MCNC: 6.1 G/DL (ref 6.4–8.3)
RBC # BLD AUTO: 4.49 10E6/UL (ref 3.8–5.2)
SODIUM SERPL-SCNC: 141 MMOL/L (ref 136–145)
TRIGL SERPL-MCNC: 56 MG/DL
TSH SERPL DL<=0.005 MIU/L-ACNC: 3.79 UIU/ML (ref 0.3–4.2)
WBC # BLD AUTO: 6 10E3/UL (ref 4–11)

## 2023-06-09 PROCEDURE — 80061 LIPID PANEL: CPT | Performed by: PATHOLOGY

## 2023-06-09 PROCEDURE — G1010 CDSM STANSON: HCPCS | Performed by: RADIOLOGY

## 2023-06-09 PROCEDURE — 85025 COMPLETE CBC W/AUTO DIFF WBC: CPT | Performed by: PATHOLOGY

## 2023-06-09 PROCEDURE — 84443 ASSAY THYROID STIM HORMONE: CPT | Performed by: PATHOLOGY

## 2023-06-09 PROCEDURE — 999N000285 HC STATISTIC VASC ACCESS LAB DRAW WITH PIV START

## 2023-06-09 PROCEDURE — 74177 CT ABD & PELVIS W/CONTRAST: CPT | Mod: MG | Performed by: RADIOLOGY

## 2023-06-09 PROCEDURE — 36415 COLL VENOUS BLD VENIPUNCTURE: CPT | Performed by: PATHOLOGY

## 2023-06-09 PROCEDURE — 80053 COMPREHEN METABOLIC PANEL: CPT | Performed by: PATHOLOGY

## 2023-06-09 PROCEDURE — 71260 CT THORAX DX C+: CPT | Mod: MG | Performed by: RADIOLOGY

## 2023-06-09 PROCEDURE — 83036 HEMOGLOBIN GLYCOSYLATED A1C: CPT | Performed by: FAMILY MEDICINE

## 2023-06-09 RX ORDER — IOPAMIDOL 755 MG/ML
89 INJECTION, SOLUTION INTRAVASCULAR ONCE
Status: COMPLETED | OUTPATIENT
Start: 2023-06-09 | End: 2023-06-09

## 2023-06-09 RX ORDER — POTASSIUM CHLORIDE 1500 MG/1
20 TABLET, EXTENDED RELEASE ORAL 2 TIMES DAILY
Qty: 20 TABLET | Refills: 1 | Status: SHIPPED | OUTPATIENT
Start: 2023-06-09 | End: 2023-06-23

## 2023-06-09 RX ADMIN — IOPAMIDOL 89 ML: 755 INJECTION, SOLUTION INTRAVASCULAR at 10:20

## 2023-06-09 NOTE — NURSING NOTE
Chief Complaint   Patient presents with     Labs Only     Pt had labs drawn via PIV placed by VAT.     Labs drawn from PIV placed by VAT/RN. Line flushed with saline. Vitals taken. Pt checked in for appointment(s).    Jacob Almaraz RN

## 2023-06-09 NOTE — TELEPHONE ENCOUNTER
See results patient has low potassium and needs supplementation and follow-up.    Mindy Vieira DO

## 2023-06-11 NOTE — PROGRESS NOTES
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   -  Background  In brief, she noted an abdominal mass at the end of 2013, which was quite large and was resected on 01/28/2014. This tumor was about 26 cm in greatest diameter, high-grade, with necrosis present. In retrospect, she had had a CT scan done about 2 years before that, which was felt to be negative. In 08/2014, repeat imaging revealed a 3.5 cm mass near the psoas, and she received preoperative radiation therapy for this. 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 A new margin was obtained, but this was still positive for liposarcoma. All visible tumor was removed.      A recurrence was noted in the abd and she began lipodox/ifos about 3-19-15 and had 6 cycles; the last in August 2015.  -  She was admitted for partial SBO earlier in 2018.  -  She had a normal stress echo 8-31-21. She also had a normal CT angio.  -        Interval history    She is not doing that well since her CT Friday.  She has some pressure in the frontal head since the CT.  She does note a red face after CTs.  She actually noted fatigue in [place].    Her K was very low and she started KCl 3 days ago.    She has been on a special diet since she was in [place] and she lost 5 lbs.    Still 5 BMs/d but no \"diarrhea\"; this was improved on the diet. She does have chronic diarrhea that depends on what she eats; still an issue. She has a history of gi issues; no imodium now but might consider trying it again; shes concerned about getting another SBO.   She had some colitis episodes in the past.    She had a cystoscopy in Dec 2022 for UTIs.    Takes prilosec occ    They just got back from [place] last month.  In [place] she was going to the gym and doing " some weightlifting riding the bike.  She is also walking the dog but not the last few days.      She does note a little bit of very mild sciatica on the right sometimes and the neuropathy does bother her feet. Right now she is taking gabapentin at bedtime but she sometimes has foot problems before she falls asleep.       They will be going back to [place] at the end of the month.     She had some hematuria in oct in [place] and this happened twice.    She had 2 UTIs in August and was put on Macrobid daily but is not taking it now.  She saw urology who felt this might be due to the previous radiation.     She has some weakness in the right leg , stable, and if that does not affect her life.    She has occ GERD; on omeprazole about q 2-3 d.      Vertigo is OK now.  In June 2018 she had several episodes of vertigo and developed noise in her R ear requiring hospitalization.  She is not having vertigo now but has R hearing loss and has hearing aids.     Mood not good now as she feels bad but was good before.    She got both covid shots and booster.  She had covid in Feb 2023 -she tranisently lost her taste but was otherwise asymptomatic.     She has a longstanding history of hypothyroidism on replacement T4, and some seasonal allergies. She was also noted to have an antiphospholipid antibody. This was checked because her sister presented with a mesenteric artery clot.         -  Background PMH, FH, SH  PAST MEDICAL HISTORY: She did have a tonsillectomy at age 15 and some sinus surgery in about 1994 along with a tubal pregnancy and a tubal ligation.   ALLERGIES: She does describe 1 hive after IV contrast material about 20 years ago, so she has been taking prednisone and Benadryl for contrast scans. She does have some itching with Dilaudid, but still uses it.   SOCIAL HISTORY: She is a never smoker and does not abuse alcohol or other drugs. 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  -          On exam she appeared comfortable but tired with normal affect.   HEENT full EOMs  NECK no obvious goiter or mass  CHEST no resp distress  NEURO  normal mentation and speech   PSYCH Mood somewhat less positive than usual.  MSK good neck movement     -  CBC, LFT, GNE OK except K 2.9  w creat 0.88, Hb 14.9,   glu 134    -   I reviewed her new CT images of 6-9-23 and there is no clear recurrence.    Formal read:  CT-CAP 6-9-23  IMPRESSION:  1.  No evidence of metastatic disease within the chest, abdomen, or pelvis.    -  A/P   We discussed a number of issues.      1-sarcoma. abd dedifferentiated liposarcomq.  CHEIKH now.      She is almost 8 years out from last chemotherapy (August 2015).     There are several new issues  -elevated glucose  -frontal head pressure  -fatigue  -hypokalemia        We would restage about next June but we need to evaluate the K, headaches, fatigue/malaise     Contrast allergy requires methylpred; She has steroid Rx for the next scan and takes that.     1a-intermittent hematuria  On macrobid and gets cysto w urol 12-23  Following w PCP and now on macrobid and estrace cream    Had cystoscopy in Dec 2022 and follows w urology.     1b hypokalemia    K was 2.9 - we need to recheck  We will recheck K now and have her follow that with PCP.    1c-new persistent frontal head pressure since 3 days  We will get brain mri tomorrow and neurology visit    1d-hypokalemia  Will recheck now      2-elevated FBS but higher than before  Was elevated this time at 134-will recheck w MRI and follow w PCP    Creat is fairly stable.  She will f/u w PCP.     3-neuropathy  She will use gabapentin as needed-may increase dose     5-GERD  She will use prilosec prn now    6-Diarrhea  Stable, f/u w PCP     7-hypothyroidisim, hearing loss  Stable f/u w PCP     All questions were addressed and she will call if other questions arise.      Rob Wiseman M.D.  Professor  Hematology, Oncology and  Transplantation

## 2023-06-13 ENCOUNTER — VIRTUAL VISIT (OUTPATIENT)
Dept: ONCOLOGY | Facility: CLINIC | Age: 68
End: 2023-06-13
Attending: INTERNAL MEDICINE
Payer: MEDICARE

## 2023-06-13 DIAGNOSIS — E03.4 HYPOTHYROIDISM DUE TO ACQUIRED ATROPHY OF THYROID: ICD-10-CM

## 2023-06-13 DIAGNOSIS — C49.9 SARCOMA OF SOFT TISSUE (H): Primary | ICD-10-CM

## 2023-06-13 DIAGNOSIS — R73.09 ELEVATED GLUCOSE: ICD-10-CM

## 2023-06-13 DIAGNOSIS — E87.6 HYPOKALEMIA: ICD-10-CM

## 2023-06-13 DIAGNOSIS — G44.52 NEW DAILY PERSISTENT HEADACHE: ICD-10-CM

## 2023-06-13 PROCEDURE — 99215 OFFICE O/P EST HI 40 MIN: CPT | Mod: VID | Performed by: INTERNAL MEDICINE

## 2023-06-13 NOTE — NURSING NOTE
Is the patient currently in the state of [place]? YES    Visit mode:VIDEO    If the visit is dropped, the patient can be reconnected by: VIDEO VISIT: Text to cell phone: [phone]    Will anyone else be joining the visit? NO      How would you like to obtain your AVS? MyChart    Are changes needed to the allergy or medication list? YES: See changes below.   Patient takes 40mg Prilosec as needed.  Patient takes Centrum daily multivitamin      Patient has had head pressure, redness in her face,  and headache since her CT Scan. Pt also mentioned her potassium levels are low.     Reason for visit: MAULIK Leal, Virtual Facilitator           Type 2 diabetes mellitus with stage 3b chronic kidney disease, with long-term current use of insulin

## 2023-06-13 NOTE — LETTER
"    6/13/2023         RE: Shiela Hewitt  [address] [place] [address]        Dear Colleague,    Thank you for referring your patient, Shiela Hewitt, to the [facility]. Please see a copy of my visit note below.    Virtual Visit Details    Type of service:  Video Visit   Video Start Time:about 156    Stop abouty 225  Pt home  prov offsite  [facility]            6-13-23      I saw Shiela Hewitt for f/u of a recurrent abdominal dedifferentiated liposarcoma.   -  Background  In brief, she noted an abdominal mass at the end of 2013, which was quite large and was resected on 01/28/2014. This tumor was about 26 cm in greatest diameter, high-grade, with necrosis present. In retrospect, she had had a CT scan done about 2 years before that, which was felt to be negative. In 08/2014, repeat imaging revealed a 3.5 cm mass near the psoas, and she received preoperative radiation therapy for this. 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 A new margin was obtained, but this was still positive for liposarcoma. All visible tumor was removed.      A recurrence was noted in the abd and she began lipodox/ifos about 3-19-15 and had 6 cycles; the last in August 2015.  -  She was admitted for partial SBO earlier in 2018.  -  She had a normal stress echo 8-31-21. She also had a normal CT angio.  -        Interval history    She is not doing that well since her CT Friday.  She has some pressure in the frontal head since the CT.  She does note a red face after CTs.  She actually noted fatigue in [place].    Her K was very low and she started KCl 3 days ago.    She has been on a special diet since she was in [place] and she lost 5 lbs.    Still 5 BMs/d but no \"diarrhea\"; this was improved on the diet. She does have chronic diarrhea that depends on what she eats; still an issue. She has a history of gi issues; no imodium now but might " consider trying it again; shes concerned about getting another SBO.   She had some colitis episodes in the past.    She had a cystoscopy in Dec 2022 for UTIs.    Takes prilosec occ    They just got back from [place] last month.  In [place] she was going to the gym and doing some weightlifting riding the bike.  She is also walking the dog but not the last few days.      She does note a little bit of very mild sciatica on the right sometimes and the neuropathy does bother her feet. Right now she is taking gabapentin at bedtime but she sometimes has foot problems before she falls asleep.       They will be going back to [place] at the end of the month.     She had some hematuria in oct in [place] and this happened twice.    She had 2 UTIs in August and was put on Macrobid daily but is not taking it now.  She saw urology who felt this might be due to the previous radiation.     She has some weakness in the right leg , stable, and if that does not affect her life.    She has occ GERD; on omeprazole about q 2-3 d.      Vertigo is OK now.  In June 2018 she had several episodes of vertigo and developed noise in her R ear requiring hospitalization.  She is not having vertigo now but has R hearing loss and has hearing aids.     Mood not good now as she feels bad but was good before.    She got both covid shots and booster.  She had covid in Feb 2023 -she tranisently lost her taste but was otherwise asymptomatic.     She has a longstanding history of hypothyroidism on replacement T4, and some seasonal allergies. She was also noted to have an antiphospholipid antibody. This was checked because her sister presented with a mesenteric artery clot.         -  Background PMH, FH, SH  PAST MEDICAL HISTORY: She did have a tonsillectomy at age 15 and some sinus surgery in about 1994 along with a tubal pregnancy and a tubal ligation.   ALLERGIES: She does describe 1 hive after IV contrast material about 20 years ago, so she has been taking prednisone  and Benadryl for contrast scans. She does have some itching with Dilaudid, but still uses it.   SOCIAL HISTORY: She is a never smoker and does not abuse alcohol or other drugs. 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  -          On exam she appeared comfortable but tired with normal affect.   HEENT full EOMs  NECK no obvious goiter or mass  CHEST no resp distress  NEURO  normal mentation and speech   PSYCH Mood somewhat less positive than usual.  MSK good neck movement     -  CBC, LFT, GNE OK except K 2.9  w creat 0.88, Hb 14.9,   glu 134    -   I reviewed her new CT images of 6-9-23 and there is no clear recurrence.    Formal read:  CT-CAP 6-9-23  IMPRESSION:  1.  No evidence of metastatic disease within the chest, abdomen, or pelvis.    -  A/P   We discussed a number of issues.      1-sarcoma. abd dedifferentiated liposarcomq.  CHEIKH now.      She is almost 8 years out from last chemotherapy (August 2015).     There are several new issues  -elevated glucose  -frontal head pressure  -fatigue  -hypokalemia        We would restage about next June but we need to evaluate the K, headaches, fatigue/malaise     Contrast allergy requires methylpred; She has steroid Rx for the next scan and takes that.     1a-intermittent hematuria  On macrobid and gets cysto w urol 12-23  Following w PCP and now on macrobid and estrace cream    Had cystoscopy in Dec 2022 and follows w urology.     1b hypokalemia    K was 2.9 - we need to recheck  We will recheck K now and have her follow that with PCP.    1c-new persistent frontal head pressure since 3 days  We will get brain mri tomorrow and neurology visit    1d-hypokalemia  Will recheck now      2-elevated FBS but higher than before  Was elevated this time at 134-will recheck w MRI and follow w PCP    Creat is fairly stable.  She will f/u w PCP.     3-neuropathy  She will use gabapentin as needed-may increase dose     5-GERD  She  will use prilosec prn now    6-Diarrhea  Stable, f/u w PCP     7-hypothyroidisim, hearing loss  Stable f/u w PCP     All questions were addressed and she will call if other questions arise.      Rob Wiseman M.D.  Professor  Hematology, Oncology and Transplantation

## 2023-06-15 ENCOUNTER — TELEPHONE (OUTPATIENT)
Dept: FAMILY MEDICINE | Facility: CLINIC | Age: 68
End: 2023-06-15

## 2023-06-15 ENCOUNTER — ANCILLARY PROCEDURE (OUTPATIENT)
Dept: MRI IMAGING | Facility: CLINIC | Age: 68
End: 2023-06-15
Attending: INTERNAL MEDICINE
Payer: MEDICARE

## 2023-06-15 ENCOUNTER — LAB (OUTPATIENT)
Dept: LAB | Facility: CLINIC | Age: 68
End: 2023-06-15
Attending: INTERNAL MEDICINE
Payer: MEDICARE

## 2023-06-15 DIAGNOSIS — G44.52 NEW DAILY PERSISTENT HEADACHE: ICD-10-CM

## 2023-06-15 DIAGNOSIS — C49.9 SARCOMA OF SOFT TISSUE (H): ICD-10-CM

## 2023-06-15 DIAGNOSIS — E87.6 HYPOKALEMIA: ICD-10-CM

## 2023-06-15 DIAGNOSIS — R73.09 ELEVATED GLUCOSE: ICD-10-CM

## 2023-06-15 DIAGNOSIS — E03.4 HYPOTHYROIDISM DUE TO ACQUIRED ATROPHY OF THYROID: ICD-10-CM

## 2023-06-15 LAB
ALBUMIN SERPL BCG-MCNC: 4.6 G/DL (ref 3.5–5.2)
ALP SERPL-CCNC: 79 U/L (ref 35–104)
ALT SERPL W P-5'-P-CCNC: 23 U/L (ref 0–50)
ANION GAP SERPL CALCULATED.3IONS-SCNC: 11 MMOL/L (ref 7–15)
AST SERPL W P-5'-P-CCNC: 26 U/L (ref 0–45)
BILIRUB SERPL-MCNC: 0.5 MG/DL
BUN SERPL-MCNC: 23.9 MG/DL (ref 8–23)
CALCIUM SERPL-MCNC: 9.8 MG/DL (ref 8.8–10.2)
CHLORIDE SERPL-SCNC: 106 MMOL/L (ref 98–107)
CREAT SERPL-MCNC: 1.25 MG/DL (ref 0.51–0.95)
DEPRECATED HCO3 PLAS-SCNC: 23 MMOL/L (ref 22–29)
GFR SERPL CREATININE-BSD FRML MDRD: 47 ML/MIN/1.73M2
GLUCOSE SERPL-MCNC: 158 MG/DL (ref 70–99)
POTASSIUM SERPL-SCNC: 5.5 MMOL/L (ref 3.4–5.3)
PROT SERPL-MCNC: 7.5 G/DL (ref 6.4–8.3)
SODIUM SERPL-SCNC: 140 MMOL/L (ref 136–145)

## 2023-06-15 PROCEDURE — A9585 GADOBUTROL INJECTION: HCPCS | Performed by: RADIOLOGY

## 2023-06-15 PROCEDURE — 36415 COLL VENOUS BLD VENIPUNCTURE: CPT

## 2023-06-15 PROCEDURE — 82435 ASSAY OF BLOOD CHLORIDE: CPT

## 2023-06-15 PROCEDURE — 70553 MRI BRAIN STEM W/O & W/DYE: CPT | Mod: MF | Performed by: RADIOLOGY

## 2023-06-15 PROCEDURE — 999N000285 HC STATISTIC VASC ACCESS LAB DRAW WITH PIV START

## 2023-06-15 PROCEDURE — G1010 CDSM STANSON: HCPCS | Performed by: RADIOLOGY

## 2023-06-15 RX ORDER — GADOBUTROL 604.72 MG/ML
7.5 INJECTION INTRAVENOUS ONCE
Status: COMPLETED | OUTPATIENT
Start: 2023-06-15 | End: 2023-06-15

## 2023-06-15 RX ADMIN — GADOBUTROL 7.5 ML: 604.72 INJECTION INTRAVENOUS at 16:42

## 2023-06-15 NOTE — TELEPHONE ENCOUNTER
Patient is calling to give Dr Vieira an FYI.     Patient has had a bad headache for 5 days but better today but is scheduled for an MRI today.  Patient is also having her potassium labs done today also.  I scheduled patient with you on Friday, 06/23/23 for a follow up.     RANDALL Alvarez  [facility]

## 2023-06-15 NOTE — NURSING NOTE
Chief Complaint   Patient presents with     Blood Draw     IV placement with blood draw by vascular ultrasound.     Amelie Garcia RN

## 2023-06-23 ENCOUNTER — OFFICE VISIT (OUTPATIENT)
Dept: FAMILY MEDICINE | Facility: CLINIC | Age: 68
End: 2023-06-23
Payer: MEDICARE

## 2023-06-23 VITALS
OXYGEN SATURATION: 99 % | HEART RATE: 63 BPM | SYSTOLIC BLOOD PRESSURE: 104 MMHG | HEIGHT: 69 IN | WEIGHT: 161.6 LBS | DIASTOLIC BLOOD PRESSURE: 68 MMHG | RESPIRATION RATE: 16 BRPM | BODY MASS INDEX: 23.93 KG/M2 | TEMPERATURE: 98 F

## 2023-06-23 DIAGNOSIS — R79.89 ELEVATED SERUM CREATININE: ICD-10-CM

## 2023-06-23 DIAGNOSIS — R73.09 ELEVATED GLUCOSE: ICD-10-CM

## 2023-06-23 DIAGNOSIS — E03.9 HYPOTHYROIDISM, UNSPECIFIED TYPE: ICD-10-CM

## 2023-06-23 DIAGNOSIS — E87.6 HYPOKALEMIA: Primary | ICD-10-CM

## 2023-06-23 PROBLEM — C48.0 LIPOSARCOMA OF RETROPERITONEUM (H): Status: ACTIVE | Noted: 2023-06-23

## 2023-06-23 PROBLEM — G62.2 POLYNEUROPATHY DUE TO OTHER TOXIC AGENTS (H): Status: ACTIVE | Noted: 2023-06-23

## 2023-06-23 PROCEDURE — 99215 OFFICE O/P EST HI 40 MIN: CPT | Mod: 25 | Performed by: FAMILY MEDICINE

## 2023-06-23 PROCEDURE — 0121A COVID-19 BIVALENT 12+ (PFIZER): CPT | Performed by: FAMILY MEDICINE

## 2023-06-23 PROCEDURE — 91312 COVID-19 BIVALENT 12+ (PFIZER): CPT | Performed by: FAMILY MEDICINE

## 2023-06-23 PROCEDURE — 80048 BASIC METABOLIC PNL TOTAL CA: CPT | Performed by: FAMILY MEDICINE

## 2023-06-23 PROCEDURE — 36415 COLL VENOUS BLD VENIPUNCTURE: CPT | Performed by: FAMILY MEDICINE

## 2023-06-23 RX ORDER — LEVOTHYROXINE SODIUM 75 UG/1
75 TABLET ORAL DAILY
Qty: 90 TABLET | Refills: 3 | Status: SHIPPED | OUTPATIENT
Start: 2023-06-23 | End: 2024-06-20

## 2023-06-23 ASSESSMENT — PAIN SCALES - GENERAL: PAINLEVEL: NO PAIN (0)

## 2023-06-23 NOTE — PROGRESS NOTES
Assessment & Plan     Hypokalemia  It is unclear what caused her hypokalemia but will recheck today.  She is not on potassium supplements currently.  - Basic metabolic panel  (Ca, Cl, CO2, Creat, Gluc, K, Na, BUN); Future  - Basic metabolic panel  (Ca, Cl, CO2, Creat, Gluc, K, Na, BUN)    Elevated glucose  We will monitor with labs today  - Basic metabolic panel  (Ca, Cl, CO2, Creat, Gluc, K, Na, BUN); Future    Elevated serum creatinine  This was a new issue for her she is not sure if she was dehydrated at that time monitor with labs today  - Basic metabolic panel  (Ca, Cl, CO2, Creat, Gluc, K, Na, BUN); Future    Hypothyroidism, unspecified type  The current medical regimen is effective;  continue present plan and medications.    - levothyroxine (EUTHYROX) 75 MCG tablet; Take 1 tablet (75 mcg) by mouth daily      40 minutes spent by me on the date of the encounter doing chart review, history and exam, documentation and further activities per the note         Mindy Vieira DO  [facility]    Terrance Kuo is a 67 year old, presenting for the following health issues:  Follow Up (Recent lab results/)        6/23/2023     9:12 AM   Additional Questions   Roomed by Belinda         6/23/2023     9:12 AM   Patient Reported Additional Medications   Patient reports taking the following new medications None     History of Present Illness       Reason for visit:  Abnormal blood work  Symptom onset:  1-2 weeks ago  Symptoms include:  I feel better now still fatigued a bit, headache gone  Symptom intensity:  Mild  Symptom progression:  Improving  Had these symptoms before:  Yes  Has tried/received treatment for these symptoms:  No  What makes it worse:  No  What makes it better:  No    She eats 2-3 servings of fruits and vegetables daily.She consumes 0 sweetened beverage(s) daily.She exercises with enough effort to increase her heart rate 10 to 19 minutes per day.  She exercises with enough effort  "to increase her heart rate 3 or less days per week.   She is taking medications regularly.      She had low potasium found through her oncologist.  She was on a diet during this time.  It is called fast metabolism diet.  She was on this diet for a month.  It did contain fruits and veggies.  She was not having diarrhea.  She was prescribed potassium and when it was rechecked it was 5.5.  She is off the potassium now.      She had a terrible headache last week.  Her oncologist ordered a MRI of her brain which was normal except mild leukoaraiosis.  She feels off balance.  She felt like her head was in a fog.  She then started vomiting and her headache resolved.  She has been fatigued for weeks also.  She didn't check for covid while she had the headache but she had covid 2/2023 in [place].      Review of Systems   Constitutional, HEENT, cardiovascular, pulmonary, gi and gu systems are negative, except as otherwise noted.      Objective    /68 (BP Location: Right arm, Patient Position: Sitting, Cuff Size: Adult Regular)   Pulse 63   Temp 98  F (36.7  C) (Oral)   Resp 16   Ht 1.74 m (5' 8.5\")   Wt 73.3 kg (161 lb 9.6 oz)   SpO2 99%   BMI 24.21 kg/m    Body mass index is 24.21 kg/m .  Physical Exam   GENERAL: healthy, alert and no distress  NECK: no adenopathy, no asymmetry, masses, or scars and thyroid normal to palpation  RESP: lungs clear to auscultation - no rales, rhonchi or wheezes  CV: regular rate and rhythm, normal S1 S2, no S3 or S4, no murmur, click or rub, no peripheral edema and peripheral pulses strong  MS: no gross musculoskeletal defects noted, no edema  PSYCH: mentation appears normal, affect normal/bright    No results found for this or any previous visit (from the past 24 hour(s)).                "

## 2023-06-24 LAB
ANION GAP SERPL CALCULATED.3IONS-SCNC: 11 MMOL/L (ref 7–15)
BUN SERPL-MCNC: 22.4 MG/DL (ref 8–23)
CALCIUM SERPL-MCNC: 9.6 MG/DL (ref 8.8–10.2)
CHLORIDE SERPL-SCNC: 108 MMOL/L (ref 98–107)
CREAT SERPL-MCNC: 0.97 MG/DL (ref 0.51–0.95)
DEPRECATED HCO3 PLAS-SCNC: 21 MMOL/L (ref 22–29)
GFR SERPL CREATININE-BSD FRML MDRD: 64 ML/MIN/1.73M2
GLUCOSE SERPL-MCNC: 96 MG/DL (ref 70–99)
POTASSIUM SERPL-SCNC: 4.7 MMOL/L (ref 3.4–5.3)
SODIUM SERPL-SCNC: 140 MMOL/L (ref 136–145)

## 2023-07-24 ENCOUNTER — E-VISIT (OUTPATIENT)
Dept: URGENT CARE | Facility: CLINIC | Age: 68
End: 2023-07-24
Payer: MEDICARE

## 2023-07-24 ENCOUNTER — LAB (OUTPATIENT)
Dept: LAB | Facility: CLINIC | Age: 68
End: 2023-07-24
Payer: MEDICARE

## 2023-07-24 ENCOUNTER — TELEPHONE (OUTPATIENT)
Dept: FAMILY MEDICINE | Facility: CLINIC | Age: 68
End: 2023-07-24
Payer: MEDICARE

## 2023-07-24 DIAGNOSIS — R30.0 DYSURIA: ICD-10-CM

## 2023-07-24 DIAGNOSIS — R30.0 DYSURIA: Primary | ICD-10-CM

## 2023-07-24 DIAGNOSIS — N30.00 ACUTE CYSTITIS WITHOUT HEMATURIA: Primary | ICD-10-CM

## 2023-07-24 LAB
ALBUMIN UR-MCNC: ABNORMAL MG/DL
APPEARANCE UR: CLEAR
BACTERIA #/AREA URNS HPF: ABNORMAL /HPF
BILIRUB UR QL STRIP: NEGATIVE
COLOR UR AUTO: YELLOW
GLUCOSE UR STRIP-MCNC: NEGATIVE MG/DL
HGB UR QL STRIP: NEGATIVE
KETONES UR STRIP-MCNC: NEGATIVE MG/DL
LEUKOCYTE ESTERASE UR QL STRIP: ABNORMAL
NITRATE UR QL: NEGATIVE
PH UR STRIP: 5 [PH] (ref 5–7)
RBC #/AREA URNS AUTO: ABNORMAL /HPF
SP GR UR STRIP: 1.02 (ref 1–1.03)
SQUAMOUS #/AREA URNS AUTO: ABNORMAL /LPF
UROBILINOGEN UR STRIP-ACNC: 0.2 E.U./DL
WBC #/AREA URNS AUTO: ABNORMAL /HPF

## 2023-07-24 PROCEDURE — 99207 PR NON-BILLABLE SERV PER CHARTING: CPT | Performed by: PHYSICIAN ASSISTANT

## 2023-07-24 PROCEDURE — 81001 URINALYSIS AUTO W/SCOPE: CPT

## 2023-07-24 PROCEDURE — 87086 URINE CULTURE/COLONY COUNT: CPT

## 2023-07-24 RX ORDER — SULFAMETHOXAZOLE/TRIMETHOPRIM 800-160 MG
1 TABLET ORAL 2 TIMES DAILY
Qty: 6 TABLET | Refills: 0 | Status: SHIPPED | OUTPATIENT
Start: 2023-07-24 | End: 2023-07-27

## 2023-07-24 NOTE — TELEPHONE ENCOUNTER
"Patient feeling symptoms of UTI. She reports having these frequently. Symptoms: burning, frequency, urgency  Denies: Fever, low back pain     RN educated/encoiuraged on EVISIT and educated for fastest response to place this EVISIT in for \"any provider\".   RN encouraged patient to call back if she cannot figure out how this works and we can get her in for a virtual visit to discuss concerns above. She verbalized understanding.     Sent mychart with EVISIT directions.   Belinda Jane RN on 7/24/2023 at 8:45 AM    "

## 2023-07-24 NOTE — PATIENT INSTRUCTIONS
Dear Shiela Hewitt,     After reviewing your responses, I would like you to come in for a urine test to make sure we treat you correctly. This urine test is to evaluate you for a possible urinary tract infection, and should be scheduled for today or tomorrow. Schedule a Lab Only appointment here.     Lab appointments are not available at most locations on the weekends. If no Lab Only appointment is available, you should be seen in any of our convenient Walk-in or Urgent Care Centers, which can be found on our website here.     You will receive instructions with your results in [employer] once they are available.     If your symptoms worsen, you develop pain in your back or stomach, develop fevers, or are not improving in 5 days, please contact your primary care provider for an appointment or visit a Walk-in or Urgent Care Center to be seen.     Thanks again for choosing us as your health care partner,     Mary Ann Mckenna PA-C

## 2023-07-26 LAB — BACTERIA UR CULT: NORMAL

## 2023-08-01 ASSESSMENT — ENCOUNTER SYMPTOMS
SHORTNESS OF BREATH: 0
PALPITATIONS: 0
HEARTBURN: 0
MYALGIAS: 0
FEVER: 0
HEMATOCHEZIA: 0
SORE THROAT: 0
FREQUENCY: 0
EYE PAIN: 0
ABDOMINAL PAIN: 0
NERVOUS/ANXIOUS: 0
HEADACHES: 0
PARESTHESIAS: 0
WEAKNESS: 0
DIZZINESS: 1
HEMATURIA: 0
DYSURIA: 0
CONSTIPATION: 0
CHILLS: 0
JOINT SWELLING: 0
COUGH: 0
DIARRHEA: 1
ARTHRALGIAS: 0

## 2023-08-01 ASSESSMENT — ACTIVITIES OF DAILY LIVING (ADL): CURRENT_FUNCTION: NO ASSISTANCE NEEDED

## 2023-08-03 ENCOUNTER — OFFICE VISIT (OUTPATIENT)
Dept: FAMILY MEDICINE | Facility: CLINIC | Age: 68
End: 2023-08-03
Payer: MEDICARE

## 2023-08-03 VITALS
TEMPERATURE: 97.4 F | WEIGHT: 163 LBS | DIASTOLIC BLOOD PRESSURE: 66 MMHG | HEART RATE: 81 BPM | SYSTOLIC BLOOD PRESSURE: 102 MMHG | BODY MASS INDEX: 24.14 KG/M2 | HEIGHT: 69 IN | RESPIRATION RATE: 12 BRPM | OXYGEN SATURATION: 98 %

## 2023-08-03 DIAGNOSIS — Z12.31 VISIT FOR SCREENING MAMMOGRAM: ICD-10-CM

## 2023-08-03 DIAGNOSIS — Z00.00 ENCOUNTER FOR MEDICARE ANNUAL WELLNESS EXAM: Primary | ICD-10-CM

## 2023-08-03 DIAGNOSIS — G62.2 POLYNEUROPATHY DUE TO OTHER TOXIC AGENTS (H): ICD-10-CM

## 2023-08-03 DIAGNOSIS — E11.9 TYPE 2 DIABETES MELLITUS WITHOUT COMPLICATION, WITHOUT LONG-TERM CURRENT USE OF INSULIN (H): ICD-10-CM

## 2023-08-03 DIAGNOSIS — C46.9 KAPOSI'S SARCOMA (H): ICD-10-CM

## 2023-08-03 DIAGNOSIS — C49.9 SARCOMA OF SOFT TISSUE (H): Primary | ICD-10-CM

## 2023-08-03 DIAGNOSIS — R06.02 SOB (SHORTNESS OF BREATH): ICD-10-CM

## 2023-08-03 LAB
ANION GAP SERPL CALCULATED.3IONS-SCNC: 12 MMOL/L (ref 7–15)
BUN SERPL-MCNC: 22.2 MG/DL (ref 8–23)
CALCIUM SERPL-MCNC: 9.7 MG/DL (ref 8.8–10.2)
CHLORIDE SERPL-SCNC: 106 MMOL/L (ref 98–107)
CREAT SERPL-MCNC: 1.08 MG/DL (ref 0.51–0.95)
DEPRECATED HCO3 PLAS-SCNC: 24 MMOL/L (ref 22–29)
GFR SERPL CREATININE-BSD FRML MDRD: 56 ML/MIN/1.73M2
GLUCOSE SERPL-MCNC: 100 MG/DL (ref 70–99)
POTASSIUM SERPL-SCNC: 3.8 MMOL/L (ref 3.4–5.3)
SODIUM SERPL-SCNC: 142 MMOL/L (ref 136–145)

## 2023-08-03 PROCEDURE — 36415 COLL VENOUS BLD VENIPUNCTURE: CPT | Performed by: FAMILY MEDICINE

## 2023-08-03 PROCEDURE — 99214 OFFICE O/P EST MOD 30 MIN: CPT | Mod: 25 | Performed by: FAMILY MEDICINE

## 2023-08-03 PROCEDURE — 80048 BASIC METABOLIC PNL TOTAL CA: CPT | Performed by: FAMILY MEDICINE

## 2023-08-03 PROCEDURE — G0439 PPPS, SUBSEQ VISIT: HCPCS | Performed by: FAMILY MEDICINE

## 2023-08-03 RX ORDER — GABAPENTIN 300 MG/1
CAPSULE ORAL
Qty: 180 CAPSULE | Refills: 3 | Status: SHIPPED | OUTPATIENT
Start: 2023-08-03 | End: 2024-09-12

## 2023-08-03 ASSESSMENT — PAIN SCALES - GENERAL: PAINLEVEL: NO PAIN (0)

## 2023-08-03 ASSESSMENT — ENCOUNTER SYMPTOMS
HEMATOCHEZIA: 0
CONSTIPATION: 0
COUGH: 0
HEADACHES: 0
FREQUENCY: 0
DIZZINESS: 1
EYE PAIN: 0
DYSURIA: 0
SHORTNESS OF BREATH: 0
PALPITATIONS: 0
CHILLS: 0
PARESTHESIAS: 0
HEARTBURN: 0
DIARRHEA: 1
HEMATURIA: 0
JOINT SWELLING: 0
NERVOUS/ANXIOUS: 0
FEVER: 0
SORE THROAT: 0
ARTHRALGIAS: 0
ABDOMINAL PAIN: 0
MYALGIAS: 0
WEAKNESS: 0

## 2023-08-03 ASSESSMENT — ACTIVITIES OF DAILY LIVING (ADL): CURRENT_FUNCTION: NO ASSISTANCE NEEDED

## 2023-08-03 NOTE — PATIENT INSTRUCTIONS
Patient Education   Personalized Prevention Plan  You are due for the preventive services outlined below.  Your care team is available to assist you in scheduling these services.  If you have already completed any of these items, please share that information with your care team to update in your medical record.  Health Maintenance Due   Topic Date Due    Eye Exam  Never done    ANNUAL REVIEW OF HM ORDERS  08/01/2023    Annual Wellness Visit  08/01/2023    Mammogram  08/23/2023     Preventive Health Recommendations    See your health care provider every year to  Review health changes.   Discuss preventive care.    Review your medicines if your doctor has prescribed any.  You no longer need a yearly Pap test unless you've had an abnormal Pap test in the past 10 years. If you have vaginal symptoms, such as bleeding or discharge, be sure to talk with your provider about a Pap test.  Every 1 to 2 years, have a mammogram.  If you are over 69, talk with your health care provider about whether or not you want to continue having screening mammograms.  Every 10 years, have a colonoscopy. Or, have a yearly FIT test (stool test). These exams will check for colon cancer.   Have a cholesterol test every 5 years, or more often if your doctor advises it.   Have a diabetes test (fasting glucose) every three years. If you are at risk for diabetes, you should have this test more often.   At age 65, have a bone density scan (DEXA) to check for osteoporosis (brittle bone disease).    Shots:  Get a flu shot each year.  Get a tetanus shot every 10 years.  Talk to your doctor about your pneumonia vaccines. There are now two you should receive - Pneumovax (PPSV 23) and Prevnar (PCV 13).  Talk to your pharmacist about the shingles vaccine.  Talk to your doctor about the hepatitis B vaccine.    Nutrition:   Eat at least 5 servings of fruits and vegetables each day.  Eat whole-grain bread, whole-wheat pasta and brown rice instead of white  grains and rice.  Get adequate Calcium and Vitamin D.     Lifestyle  Exercise at least 150 minutes a week (30 minutes a day, 5 days a week). This will help you control your weight and prevent disease.  Limit alcohol to one drink per day.  No smoking.   Wear sunscreen to prevent skin cancer.   See your dentist twice a year for an exam and cleaning.  See your eye doctor every 1 to 2 years to screen for conditions such as glaucoma, macular degeneration and cataracts.    Personalized Prevention Plan  You are due for the preventive services outlined below.  Your care team is available to assist you in scheduling these services.  If you have already completed any of these items, please share that information with your care team to update in your medical record.  Health Maintenance   Topic Date Due    EYE EXAM  Never done    ANNUAL REVIEW OF HM ORDERS  08/01/2023    MEDICARE ANNUAL WELLNESS VISIT  08/01/2023    MAMMO SCREENING  08/23/2023    INFLUENZA VACCINE (1) 09/01/2023    A1C  09/09/2023    LIPID  06/09/2024    TSH W/FREE T4 REFLEX  06/09/2024    BMP  06/23/2024    FALL RISK ASSESSMENT  08/03/2024    COLORECTAL CANCER SCREENING  09/21/2026    ADVANCE CARE PLANNING  08/01/2027    DTAP/TDAP/TD IMMUNIZATION (6 - Td or Tdap) 08/01/2032    DEXA  12/08/2036    HEPATITIS C SCREENING  Completed    PHQ-2 (once per calendar year)  Completed    Pneumococcal Vaccine: 65+ Years  Completed    ZOSTER IMMUNIZATION  Completed    COVID-19 Vaccine  Completed    IPV IMMUNIZATION  Aged Out    MENINGITIS IMMUNIZATION  Aged Out    MICROALBUMIN  Discontinued    DIABETIC FOOT EXAM  Discontinued    PAP  Discontinued

## 2023-08-03 NOTE — PROGRESS NOTES
"SUBJECTIVE:   Shiela is a 68 year old who presents for Preventive Visit.      8/3/2023     8:07 AM   Additional Questions   Roomed by Belinda         8/3/2023     8:07 AM   Patient Reported Additional Medications   Patient reports taking the following new medications None       Are you in the first 12 months of your Medicare coverage?  No    Healthy Habits:     In general, how would you rate your overall health?  Fair    Frequency of exercise:  1 day/week    Duration of exercise:  15-30 minutes    Do you usually eat at least 4 servings of fruit and vegetables a day, include whole grains    & fiber and avoid regularly eating high fat or \"junk\" foods?  Yes    Taking medications regularly:  Yes    Medication side effects:  None    Ability to successfully perform activities of daily living:  No assistance needed    Home Safety:  No safety concerns identified    Hearing Impairment:  Difficulty following a conversation in a noisy restaurant or crowded room, need to ask people to speak up or repeat themselves and difficulty understanding soft or whispered speech    In the past 6 months, have you been bothered by leaking of urine?  No    In general, how would you rate your overall mental or emotional health?  Good    Additional concerns today:  No      Have you ever done Advance Care Planning? (For example, a Health Directive, POLST, or a discussion with a medical provider or your loved ones about your wishes): Yes, patient states has an Advance Care Planning document and will bring a copy to the clinic.       Fall risk  Fallen 2 or more times in the past year?: No  Any fall with injury in the past year?: No      Cognitive Screening   1) Repeat 3 items (Leader, Season, Table)    2) Clock draw: NORMAL  3) 3 item recall: Recalls 3 objects  Results: 3 items recalled: COGNITIVE IMPAIRMENT LESS LIKELY    Mini-CogTM Copyright S Soledad. Licensed by the author for use in [facility]; reprinted with permission " (adriel@[facility]). All rights reserved.        Do you have sleep apnea, excessive snoring or daytime drowsiness? : daytime drowsiness    Reviewed and updated as needed this visit by clinical staff   Tobacco  Allergies  Meds              Reviewed and updated as needed this visit by Provider                 Social History     Tobacco Use    Smoking status: Never     Passive exposure: Never    Smokeless tobacco: Never   Substance Use Topics    Alcohol use: Yes     Comment: not much             8/1/2023    11:57 AM   Alcohol Use   Prescreen: >3 drinks/day or >7 drinks/week? No          No data to display              Do you have a current opioid prescription? No  Do you use any other controlled substances or medications that are not prescribed by a provider? None        Diabetes Follow-up/ Elevated glucose    How often are you checking your blood sugar? Not at all  What concerns do you have today about your diabetes? None   Do you have any of these symptoms? (Select all that apply)  Numbness in feet and Burning in feet  Have you had a diabetic eye exam in the last 12 months? Yes- Date of last eye exam: 11/29/2022,  Location: [facility]  Diet controlled      Takes gabapentin for neuropathy which only helps some.  Her feet are worse at night and it makes it hard to sleep.        Her potassium was really low 2.9.  she has ongoing diarrhea because of her bowel removal related to her surgery.  She sometimes goes up to 10 times a day.  It is better with fruits and veggies. She took potassium tabs and then it was too high.      Lab Results   Component Value Date    A1C 5.4 06/09/2023    A1C 5.1 06/10/2022    A1C 5.2 11/22/2021    A1C 5.2 11/23/2020    A1C 5.4 06/12/2020    A1C 5.3 10/16/2017         BP Readings from Last 2 Encounters:   08/03/23 102/66   06/23/23 104/68     Hemoglobin A1C (%)   Date Value   06/09/2023 5.4   06/10/2022 5.1   11/23/2020 5.2   06/12/2020 5.4     LDL Cholesterol Calculated (mg/dL)   Date  Value   06/09/2023 66   06/10/2022 47   11/23/2020 71   11/27/2018 41             Hypothyroidism Follow-up    Since last visit, patient describes the following symptoms: Weight stable, no hair loss, no skin changes, no constipation, no loose stools  TSH   Date Value Ref Range Status   06/09/2023 3.79 0.30 - 4.20 uIU/mL Final   06/10/2022 0.69 0.40 - 4.00 mU/L Final   05/21/2021 0.92 0.40 - 4.00 mU/L Final     Levothyroxine 75 mcg daily    She has a history of sbo.      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      Current providers sharing in care for this patient include:   Patient Care Team:  Mindy Vieira DO as PCP - General (Family Practice)  Sergio Haider MD as MD (General Surgery)  Rob Wiseman MD as MD (Hematology & Oncology)  Mindy Vieira DO as Assigned PCP  Pearl Alberto RN as Nurse Coordinator (Oncology)  Rob Wiseman MD as Assigned Cancer Care Provider  SURENDRA Mason MD as MD (Cardiovascular Disease)  Lang Davis MD as Assigned OBGYN Provider  Mary Ann Mckenna PA-C as Assigned Neuroscience Provider    The following health maintenance items are reviewed in Epic and correct as of today:  Health Maintenance   Topic Date Due    EYE EXAM  Never done    ANNUAL REVIEW OF HM ORDERS  08/01/2023    MEDICARE ANNUAL WELLNESS VISIT  08/01/2023    MAMMO SCREENING  08/23/2023    INFLUENZA VACCINE (1) 09/01/2023    A1C  09/09/2023    LIPID  06/09/2024    TSH W/FREE T4 REFLEX  06/09/2024    BMP  06/23/2024    FALL RISK ASSESSMENT  08/03/2024    COLORECTAL CANCER SCREENING  09/21/2026    ADVANCE CARE PLANNING  08/01/2027    DTAP/TDAP/TD IMMUNIZATION (6 - Td or Tdap) 08/01/2032    DEXA  12/08/2036    HEPATITIS C SCREENING  Completed    PHQ-2 (once per calendar year)  Completed    Pneumococcal Vaccine: 65+ Years  Completed    ZOSTER IMMUNIZATION  Completed    COVID-19 Vaccine  Completed    IPV IMMUNIZATION  Aged Out    MENINGITIS IMMUNIZATION  Aged Out    MICROALBUMIN   Discontinued    DIABETIC FOOT EXAM  Discontinued    PAP  Discontinued           FHS-7:       8/11/2021     2:32 PM 8/23/2022    10:53 AM   Breast CA Risk Assessment (FHS-7)   Did any of your first-degree relatives have breast or ovarian cancer? No No   Did any of your relatives have bilateral breast cancer? No No   Did any man in your family have breast cancer? No No   Did any woman in your family have breast and ovarian cancer? No No   Did any woman in your family have breast cancer before age 50 y? No No   Do you have 2 or more relatives with breast and/or ovarian cancer? No No   Do you have 2 or more relatives with breast and/or bowel cancer? No No       Mammogram Screening: Recommended mammography every 1-2 years with patient discussion and risk factor consideration  Pertinent mammograms are reviewed under the imaging tab.    Review of Systems   Constitutional:  Negative for chills and fever.   HENT:  Positive for hearing loss. Negative for congestion, ear pain and sore throat.    Eyes:  Negative for pain and visual disturbance.   Respiratory:  Negative for cough and shortness of breath.    Cardiovascular:  Negative for chest pain, palpitations and peripheral edema.   Gastrointestinal:  Positive for diarrhea. Negative for abdominal pain, constipation, heartburn and hematochezia.   Breasts:  Negative for tenderness and discharge.   Genitourinary:  Negative for dysuria, frequency, genital sores, hematuria, pelvic pain, urgency, vaginal bleeding and vaginal discharge.   Musculoskeletal:  Negative for arthralgias, joint swelling and myalgias.   Skin:  Negative for rash.   Neurological:  Positive for dizziness. Negative for weakness, headaches and paresthesias.   Psychiatric/Behavioral:  Negative for mood changes. The patient is not nervous/anxious.          OBJECTIVE:   /66 (BP Location: Right arm, Patient Position: Sitting, Cuff Size: Adult Regular)   Pulse 81   Temp 97.4  F (36.3  C) (Oral)   Resp 12    "Ht 1.74 m (5' 8.5\")   Wt 73.9 kg (163 lb)   SpO2 98%   BMI 24.42 kg/m   Estimated body mass index is 24.42 kg/m  as calculated from the following:    Height as of this encounter: 1.74 m (5' 8.5\").    Weight as of this encounter: 73.9 kg (163 lb).  Physical Exam  GENERAL: healthy, alert and no distress  EYES: Eyes grossly normal to inspection, PERRL and conjunctivae and sclerae normal  HENT: ear canals and TM's normal, nose and mouth without ulcers or lesions  NECK: no adenopathy, no asymmetry, masses, or scars and thyroid normal to palpation  RESP: no rales , no wheezes, and rhonchi R lower posterior  BREAST: normal without masses, tenderness or nipple discharge and no palpable axillary masses or adenopathy  CV: regular rate and rhythm, normal S1 S2, no S3 or S4, no murmur, click or rub, no peripheral edema and peripheral pulses strong  ABDOMEN: soft, nontender, no hepatosplenomegaly, no masses and bowel sounds normal  MS: no gross musculoskeletal defects noted, no edema  SKIN: no suspicious lesions or rashes  NEURO: Normal strength and tone, mentation intact and speech normal  PSYCH: mentation appears normal, affect normal/bright        ASSESSMENT / PLAN:   (Z00.00) Encounter for Medicare annual wellness exam  (primary encounter diagnosis)  Comment:   Plan:     (C46.9) Kaposi's sarcoma (H)  Comment:   Plan: Follow-up with oncology in December.    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 and 600 in the p.m.  Plan: gabapentin (NEURONTIN) 300 MG capsule            (E11.9) Type 2 diabetes mellitus without complication, without long-term current use of insulin (H)  Comment: Diet controlled  Plan: Hemoglobin A1c, Basic metabolic panel  (Ca, Cl,        CO2, Creat, Gluc, K, Na, BUN)            (R06.02) SOB (shortness of breath)  Comment: Patient has had a little bit of shortness of breath and cough which she mentioned after I found " some rhonchi on her lung exam.  She states this is not really bothering her.  I recommended a chest x-ray however our x-ray is down today she will return tomorrow to get the x-ray.  She declined to go to [facility] for x-ray today.  She does not have a fever  Plan: XR Chest 2 Views            (Z12.31) Visit for screening mammogram  Comment:   Plan: MA SCREENING DIGITAL BILAT - Future  (s+30)                 COUNSELING:  Reviewed preventive health counseling, as reflected in patient instructions       Regular exercise       Healthy diet/nutrition       Osteoporosis prevention/bone health        She reports that she has never smoked. She has never been exposed to tobacco smoke. She has never used smokeless tobacco.      Appropriate preventive services were discussed with this patient, including applicable screening as appropriate for cardiovascular disease, diabetes, osteopenia/osteoporosis, and glaucoma.  As appropriate for age/gender, discussed screening for colorectal cancer, prostate cancer, breast cancer, and cervical cancer. Checklist reviewing preventive services available has been given to the patient.    Reviewed patients plan of care and provided an AVS. The Basic Care Plan (routine screening as documented in Health Maintenance) for Shiela meets the Care Plan requirement. This Care Plan has been established and reviewed with the Patient.          Mindy Vieira DO  [facility]    Identified Health Risks:

## 2023-08-04 ENCOUNTER — ANCILLARY PROCEDURE (OUTPATIENT)
Dept: GENERAL RADIOLOGY | Facility: CLINIC | Age: 68
End: 2023-08-04
Attending: FAMILY MEDICINE
Payer: MEDICARE

## 2023-08-04 DIAGNOSIS — R06.02 SOB (SHORTNESS OF BREATH): ICD-10-CM

## 2023-08-04 PROCEDURE — 71046 X-RAY EXAM CHEST 2 VIEWS: CPT | Mod: TC | Performed by: RADIOLOGY

## 2023-08-24 ENCOUNTER — ANCILLARY PROCEDURE (OUTPATIENT)
Dept: MAMMOGRAPHY | Facility: CLINIC | Age: 68
End: 2023-08-24
Payer: MEDICARE

## 2023-08-24 DIAGNOSIS — Z12.31 VISIT FOR SCREENING MAMMOGRAM: ICD-10-CM

## 2023-08-24 PROCEDURE — 77063 BREAST TOMOSYNTHESIS BI: CPT | Mod: TC | Performed by: STUDENT IN AN ORGANIZED HEALTH CARE EDUCATION/TRAINING PROGRAM

## 2023-08-24 PROCEDURE — 77067 SCR MAMMO BI INCL CAD: CPT | Mod: TC | Performed by: STUDENT IN AN ORGANIZED HEALTH CARE EDUCATION/TRAINING PROGRAM

## 2023-11-29 ENCOUNTER — TRANSFERRED RECORDS (OUTPATIENT)
Dept: MULTI SPECIALTY CLINIC | Facility: CLINIC | Age: 68
End: 2023-11-29

## 2023-11-29 LAB — RETINOPATHY: NORMAL

## 2023-12-01 ENCOUNTER — LAB (OUTPATIENT)
Dept: LAB | Facility: CLINIC | Age: 68
End: 2023-12-01
Payer: MEDICARE

## 2023-12-01 ENCOUNTER — ANCILLARY PROCEDURE (OUTPATIENT)
Dept: CT IMAGING | Facility: CLINIC | Age: 68
End: 2023-12-01
Attending: INTERNAL MEDICINE
Payer: MEDICARE

## 2023-12-01 DIAGNOSIS — E11.9 TYPE 2 DIABETES MELLITUS WITHOUT COMPLICATION, WITHOUT LONG-TERM CURRENT USE OF INSULIN (H): ICD-10-CM

## 2023-12-01 DIAGNOSIS — C49.9 SARCOMA OF SOFT TISSUE (H): ICD-10-CM

## 2023-12-01 LAB
ALBUMIN SERPL BCG-MCNC: 4.6 G/DL (ref 3.5–5.2)
ALP SERPL-CCNC: 76 U/L (ref 40–150)
ALT SERPL W P-5'-P-CCNC: 22 U/L (ref 0–50)
ANION GAP SERPL CALCULATED.3IONS-SCNC: 10 MMOL/L (ref 7–15)
AST SERPL W P-5'-P-CCNC: 30 U/L (ref 0–45)
BASOPHILS # BLD AUTO: 0 10E3/UL (ref 0–0.2)
BASOPHILS NFR BLD AUTO: 0 %
BILIRUB SERPL-MCNC: 0.7 MG/DL
BUN SERPL-MCNC: 16.5 MG/DL (ref 8–23)
CALCIUM SERPL-MCNC: 9.9 MG/DL (ref 8.8–10.2)
CHLORIDE SERPL-SCNC: 104 MMOL/L (ref 98–107)
CREAT BLD-MCNC: 1.1 MG/DL (ref 0.5–1)
CREAT SERPL-MCNC: 1.02 MG/DL (ref 0.51–0.95)
DEPRECATED HCO3 PLAS-SCNC: 25 MMOL/L (ref 22–29)
EGFRCR SERPLBLD CKD-EPI 2021: 55 ML/MIN/1.73M2
EGFRCR SERPLBLD CKD-EPI 2021: 60 ML/MIN/1.73M2
EOSINOPHIL # BLD AUTO: 0 10E3/UL (ref 0–0.7)
EOSINOPHIL NFR BLD AUTO: 0 %
ERYTHROCYTE [DISTWIDTH] IN BLOOD BY AUTOMATED COUNT: 13.4 % (ref 10–15)
GLUCOSE SERPL-MCNC: 149 MG/DL (ref 70–99)
HBA1C MFR BLD: 5.6 %
HCT VFR BLD AUTO: 41.8 % (ref 35–47)
HGB BLD-MCNC: 14.2 G/DL (ref 11.7–15.7)
IMM GRANULOCYTES # BLD: 0 10E3/UL
IMM GRANULOCYTES NFR BLD: 0 %
LYMPHOCYTES # BLD AUTO: 0.7 10E3/UL (ref 0.8–5.3)
LYMPHOCYTES NFR BLD AUTO: 8 %
MCH RBC QN AUTO: 33.3 PG (ref 26.5–33)
MCHC RBC AUTO-ENTMCNC: 34 G/DL (ref 31.5–36.5)
MCV RBC AUTO: 98 FL (ref 78–100)
MONOCYTES # BLD AUTO: 0.1 10E3/UL (ref 0–1.3)
MONOCYTES NFR BLD AUTO: 1 %
NEUTROPHILS # BLD AUTO: 7.9 10E3/UL (ref 1.6–8.3)
NEUTROPHILS NFR BLD AUTO: 91 %
NRBC # BLD AUTO: 0 10E3/UL
NRBC BLD AUTO-RTO: 0 /100
PLATELET # BLD AUTO: 230 10E3/UL (ref 150–450)
POTASSIUM SERPL-SCNC: 4.2 MMOL/L (ref 3.4–5.3)
PROT SERPL-MCNC: 7.6 G/DL (ref 6.4–8.3)
RBC # BLD AUTO: 4.27 10E6/UL (ref 3.8–5.2)
SODIUM SERPL-SCNC: 139 MMOL/L (ref 135–145)
WBC # BLD AUTO: 8.8 10E3/UL (ref 4–11)

## 2023-12-01 PROCEDURE — 71260 CT THORAX DX C+: CPT | Mod: MG | Performed by: RADIOLOGY

## 2023-12-01 PROCEDURE — 74177 CT ABD & PELVIS W/CONTRAST: CPT | Mod: MG | Performed by: RADIOLOGY

## 2023-12-01 PROCEDURE — 82310 ASSAY OF CALCIUM: CPT

## 2023-12-01 PROCEDURE — 82374 ASSAY BLOOD CARBON DIOXIDE: CPT

## 2023-12-01 PROCEDURE — 82565 ASSAY OF CREATININE: CPT | Performed by: PATHOLOGY

## 2023-12-01 PROCEDURE — 83036 HEMOGLOBIN GLYCOSYLATED A1C: CPT

## 2023-12-01 PROCEDURE — 85025 COMPLETE CBC W/AUTO DIFF WBC: CPT

## 2023-12-01 PROCEDURE — 36415 COLL VENOUS BLD VENIPUNCTURE: CPT

## 2023-12-01 PROCEDURE — G1010 CDSM STANSON: HCPCS | Performed by: RADIOLOGY

## 2023-12-01 RX ORDER — IOPAMIDOL 755 MG/ML
85 INJECTION, SOLUTION INTRAVASCULAR ONCE
Status: COMPLETED | OUTPATIENT
Start: 2023-12-01 | End: 2023-12-01

## 2023-12-01 RX ADMIN — IOPAMIDOL 85 ML: 755 INJECTION, SOLUTION INTRAVASCULAR at 10:05

## 2023-12-01 NOTE — TELEPHONE ENCOUNTER
Medication requested: Methylprednisolone 32 mg tablet    Last prescribing provider: Dr. Rob Wiseman    Last clinic visit date: 6/13/23 with Dr. Wiseman    Recommendations for requested medication (if none, N/A): NA    Any other pertinent information (if none, N/A): NA    Refilled: Y/N, if NO, why?    Pended and Routed to Dr. Rob Wiseman

## 2023-12-01 NOTE — NURSING NOTE
Chief Complaint   Patient presents with    Blood Draw     Labs drawn with PIV start by vascular. Pt tolerated well.      Sarah Calabrese RN

## 2023-12-01 NOTE — DISCHARGE INSTRUCTIONS

## 2023-12-03 RX ORDER — METHYLPREDNISOLONE 32 MG/1
TABLET ORAL
Qty: 2 TABLET | Refills: 0 | Status: SHIPPED | OUTPATIENT
Start: 2023-12-03 | End: 2023-12-05

## 2023-12-05 ENCOUNTER — VIRTUAL VISIT (OUTPATIENT)
Dept: ONCOLOGY | Facility: CLINIC | Age: 68
End: 2023-12-05
Attending: INTERNAL MEDICINE
Payer: MEDICARE

## 2023-12-05 VITALS — BODY MASS INDEX: 23.55 KG/M2 | HEIGHT: 69 IN | WEIGHT: 159 LBS

## 2023-12-05 DIAGNOSIS — R79.89 ELEVATED SERUM CREATININE: ICD-10-CM

## 2023-12-05 DIAGNOSIS — C49.9 SARCOMA OF SOFT TISSUE (H): Primary | ICD-10-CM

## 2023-12-05 DIAGNOSIS — K21.9 GASTROESOPHAGEAL REFLUX DISEASE WITHOUT ESOPHAGITIS: ICD-10-CM

## 2023-12-05 DIAGNOSIS — G62.9 NEUROPATHY: ICD-10-CM

## 2023-12-05 DIAGNOSIS — R19.7 DIARRHEA, UNSPECIFIED TYPE: ICD-10-CM

## 2023-12-05 DIAGNOSIS — R73.09 ELEVATED GLUCOSE: ICD-10-CM

## 2023-12-05 DIAGNOSIS — E03.4 HYPOTHYROIDISM DUE TO ACQUIRED ATROPHY OF THYROID: ICD-10-CM

## 2023-12-05 PROCEDURE — 99214 OFFICE O/P EST MOD 30 MIN: CPT | Mod: VID | Performed by: INTERNAL MEDICINE

## 2023-12-05 RX ORDER — METHYLPREDNISOLONE 32 MG/1
TABLET ORAL
Qty: 2 TABLET | Refills: 3 | Status: SHIPPED | OUTPATIENT
Start: 2023-12-05

## 2023-12-05 ASSESSMENT — PAIN SCALES - GENERAL: PAINLEVEL: NO PAIN (0)

## 2023-12-05 NOTE — NURSING NOTE
Is the patient currently in the state of [place]? YES    Visit mode:VIDEO    If the visit is dropped, the patient can be reconnected by: VIDEO VISIT: Text to cell phone:   Telephone Information:   Mobile [phone]       Will anyone else be joining the visit? NO  (If patient encounters technical issues they should call [phone]    How would you like to obtain your AVS? MyChart    Are changes needed to the allergy or medication list? Pt stated no changes to allergies and Pt stated no med changes    Reason for visit: MAULIK Crook LPN

## 2023-12-05 NOTE — LETTER
12/5/2023         RE: Shiela Hewitt  [address]   [place] [address]        Dear Colleague,    Thank you for referring your patient, Shiela Hewitt, to the [facility]. Please see a copy of my visit note below.    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   -  Background  In brief, she noted an abdominal mass at the end of 2013, which was quite large and was resected on 01/28/2014. This tumor was about 26 cm in greatest diameter, high-grade, with necrosis present. In retrospect, she had had a CT scan done about 2 years before that, which was felt to be negative. In 08/2014, repeat imaging revealed a 3.5 cm mass near the psoas, and she received preoperative radiation therapy for this. 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 A new margin was obtained, but this was still positive for liposarcoma. All visible tumor was removed.      A recurrence was noted in the abd and she began lipodox/ifos about 3-19-15 and had 6 cycles; the last in August 2015.  -  She was admitted for partial SBO earlier in 2018.  -  She had a normal stress echo 8-31-21. She also had a normal CT angio.  -        Interval history    She had another SBO for 9 hours 2 days before the CT; she didn't go to the ER and it resolved.  That's the first time since about 6 months.    She again had a red face and neck the next morning after the CT.  She got 1 hive with gadolinium with the brain MRI.    She is no longer taking KCl and does have the diarrhea.  Normal diet.     Still ~8 BMs/d; it varies with the diet. She does have chronic diarrhea that depends on what she eats; still an issue. She has a history of gi issues; no imodium now but might consider trying  "it again-\"scared to use re SBO\"; shes concerned about getting another SBO.   She had some colitis episodes in the past.     She had a cystoscopy in Dec 2022 for UTIs.  Last UTI Aug 2022.  She saw urology who felt this might be due to the previous radiation.    Occ GERD; not taking prilosec regular but has occ and Tums.    She is doing crossfit now three times per week at a gym.  She is also walking the dog.  They will be going back to [place] at the end of the month.    She does note a little bit of very mild sciatica on the right sometimes and the neuropathy does bother her feet. Right now she is taking gabapentin at bedtime but she sometimes has foot problems before she falls asleep.-was not able to go off it re foot burning.  .      Vertigo is OK now but had it again 23.  In 2018 she had several episodes of vertigo and developed noise in her R ear requiring hospitalization.  She is not having vertigo now but has R hearing loss and has hearing aids.     Mood \"pretty good\" as her mom and sister  a couple years ago.   Does not want to see anyone on that.     She has a longstanding history of hypothyroidism on replacement T4, and some seasonal allergies. She was also noted to have an antiphospholipid antibody. This was checked because her sister presented with a mesenteric artery clot.         -  Background PMH, FH, SH  PAST MEDICAL HISTORY: She did have a tonsillectomy at age 15 and some sinus surgery in about  along with a tubal pregnancy and a tubal ligation.   ALLERGIES: She does describe 1 hive after IV contrast material about 20 years ago, so she has been taking prednisone and Benadryl for contrast scans. She does have some itching with Dilaudid, but still uses it.   SOCIAL HISTORY: She is a never smoker and does not abuse alcohol or other drugs. 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  -          On exam she appeared comfortable but tired " with normal affect.   HEENT full EOMs  NECK no obvious goiter or mass  CHEST no resp distress  NEURO  normal mentation and speech   PSYCH Mood somewhat teary discussing her mom and sister     -  CBC, LFT, GNE OK except creat 1.02,   Hb 14.2     -   I reviewed her new CT images of 12-1-23 and there is no clear recurrence but there seems to be some inflammation of the distal small bowel.     Formal read:  CT-CAP 12-1-23      IMPRESSION:  1.  Acute inflammation of the distal small bowel. Findings may reflect an infectious enteritis. Consider bowel ischemia in the differential, and correlation with laboratory values. No pneumatosis, however, at the time of imaging. There is probably   reactive mesenteric edema and free fluid in the pelvis.     2.  Status post partial right ileocolectomy and previous resection of a liposarcoma, without CT evidence of recurrent disease    -  A/P   We discussed a number of issues.      1-sarcoma. abd dedifferentiated liposarcomq.  CHEIKH now.       She is > 8 years out from last chemotherapy (August 2015).       -elevated glucose  Followed by PCP    -small bowel inflammation on CT  This almost surely is related to the SBO episode that resolved    We would restage next year.  She would like to scan a bit sooner than Dec due to anxiety.       Contrast allergy requires methylpred; She has steroid Rx for the next scan and takes that.     -intermittent hematuria/UTI  Had cystoscopy in Dec 2022 and follows w urology.  Doing well      Creat is fairly stable.  She will f/u w PCP.     -neuropathy  She will use gabapentin as needed-     -GERD  She will use prilosec prn now     -Diarrhea  Stable, f/u w PCP     -hypothyroidisim, hearing loss  Stable f/u w PCP     All questions were addressed and she will call if other questions arise.      Rob Wiseman M.D.

## 2023-12-05 NOTE — PROGRESS NOTES
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   -  Background  In brief, she noted an abdominal mass at the end of 2013, which was quite large and was resected on 01/28/2014. This tumor was about 26 cm in greatest diameter, high-grade, with necrosis present. In retrospect, she had had a CT scan done about 2 years before that, which was felt to be negative. In 08/2014, repeat imaging revealed a 3.5 cm mass near the psoas, and she received preoperative radiation therapy for this. 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 A new margin was obtained, but this was still positive for liposarcoma. All visible tumor was removed.      A recurrence was noted in the abd and she began lipodox/ifos about 3-19-15 and had 6 cycles; the last in August 2015.  -  She was admitted for partial SBO earlier in 2018.  -  She had a normal stress echo 8-31-21. She also had a normal CT angio.  -        Interval history    She had another SBO for 9 hours 2 days before the CT; she didn't go to the ER and it resolved.  That's the first time since about 6 months.    She again had a red face and neck the next morning after the CT.  She got 1 hive with gadolinium with the brain MRI.    She is no longer taking KCl and does have the diarrhea.  Normal diet.     Still ~8 BMs/d; it varies with the diet. She does have chronic diarrhea that depends on what she eats; still an issue. She has a history of gi issues; no imodium now but might consider trying it again-\"scared to use re SBO\"; shes concerned about getting another SBO.   She had some colitis episodes in the past.     She had a cystoscopy in Dec 2022 for UTIs.  Last UTI Aug 2022.  She saw urology who felt this might be due to the " "previous radiation.    Occ GERD; not taking prilosec regular but has occ and Tums.    She is doing crossfit now three times per week at a gym.  She is also walking the dog.  They will be going back to [place] at the end of the month.    She does note a little bit of very mild sciatica on the right sometimes and the neuropathy does bother her feet. Right now she is taking gabapentin at bedtime but she sometimes has foot problems before she falls asleep.-was not able to go off it re foot burning.  .      Vertigo is OK now but had it again 23.  In 2018 she had several episodes of vertigo and developed noise in her R ear requiring hospitalization.  She is not having vertigo now but has R hearing loss and has hearing aids.     Mood \"pretty good\" as her mom and sister  a couple years ago.   Does not want to see anyone on that.     She has a longstanding history of hypothyroidism on replacement T4, and some seasonal allergies. She was also noted to have an antiphospholipid antibody. This was checked because her sister presented with a mesenteric artery clot.         -  Background PMH, FH, SH  PAST MEDICAL HISTORY: She did have a tonsillectomy at age 15 and some sinus surgery in about  along with a tubal pregnancy and a tubal ligation.   ALLERGIES: She does describe 1 hive after IV contrast material about 20 years ago, so she has been taking prednisone and Benadryl for contrast scans. She does have some itching with Dilaudid, but still uses it.   SOCIAL HISTORY: She is a never smoker and does not abuse alcohol or other drugs. 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  -          On exam she appeared comfortable but tired with normal affect.   HEENT full EOMs  NECK no obvious goiter or mass  CHEST no resp distress  NEURO  normal mentation and speech   PSYCH Mood somewhat teary discussing her mom and sister     -  CBC, LFT, GNE OK except creat 1.02, ALC " 700  Hb 14.2     -   I reviewed her new CT images of 12-1-23 and there is no clear recurrence but there seems to be some inflammation of the distal small bowel.     Formal read:  CT-CAP 12-1-23      IMPRESSION:  1.  Acute inflammation of the distal small bowel. Findings may reflect an infectious enteritis. Consider bowel ischemia in the differential, and correlation with laboratory values. No pneumatosis, however, at the time of imaging. There is probably   reactive mesenteric edema and free fluid in the pelvis.     2.  Status post partial right ileocolectomy and previous resection of a liposarcoma, without CT evidence of recurrent disease    -  A/P   We discussed a number of issues.      1-sarcoma. abd dedifferentiated liposarcomq.  CHEIKH now.       She is > 8 years out from last chemotherapy (August 2015).       -elevated glucose  Followed by PCP    -small bowel inflammation on CT  This almost surely is related to the SBO episode that resolved    We would restage next year.  She would like to scan a bit sooner than Dec due to anxiety.       Contrast allergy requires methylpred; She has steroid Rx for the next scan and takes that.     -intermittent hematuria/UTI  Had cystoscopy in Dec 2022 and follows w urology.  Doing well      Creat is fairly stable.  She will f/u w PCP.     -neuropathy  She will use gabapentin as needed-     -GERD  She will use prilosec prn now     -Diarrhea  Stable, f/u w PCP     -hypothyroidisim, hearing loss  Stable f/u w PCP     All questions were addressed and she will call if other questions arise.      Rob Wiseman M.D.

## 2024-04-01 ENCOUNTER — TRANSFERRED RECORDS (OUTPATIENT)
Dept: HEALTH INFORMATION MANAGEMENT | Facility: CLINIC | Age: 69
End: 2024-04-01

## 2024-06-02 ENCOUNTER — HEALTH MAINTENANCE LETTER (OUTPATIENT)
Age: 69
End: 2024-06-02

## 2024-06-07 ENCOUNTER — LAB (OUTPATIENT)
Dept: LAB | Facility: CLINIC | Age: 69
End: 2024-06-07
Payer: MEDICARE

## 2024-06-07 ENCOUNTER — ANCILLARY PROCEDURE (OUTPATIENT)
Dept: CT IMAGING | Facility: CLINIC | Age: 69
End: 2024-06-07
Attending: INTERNAL MEDICINE
Payer: MEDICARE

## 2024-06-07 DIAGNOSIS — C49.9 SARCOMA OF SOFT TISSUE (H): ICD-10-CM

## 2024-06-07 DIAGNOSIS — G62.9 NEUROPATHY: ICD-10-CM

## 2024-06-07 DIAGNOSIS — R19.7 DIARRHEA, UNSPECIFIED TYPE: ICD-10-CM

## 2024-06-07 DIAGNOSIS — R73.09 ELEVATED GLUCOSE: ICD-10-CM

## 2024-06-07 DIAGNOSIS — E03.4 HYPOTHYROIDISM DUE TO ACQUIRED ATROPHY OF THYROID: ICD-10-CM

## 2024-06-07 DIAGNOSIS — R79.89 ELEVATED SERUM CREATININE: ICD-10-CM

## 2024-06-07 DIAGNOSIS — K21.9 GASTROESOPHAGEAL REFLUX DISEASE WITHOUT ESOPHAGITIS: ICD-10-CM

## 2024-06-07 LAB
ALBUMIN SERPL BCG-MCNC: 4.7 G/DL (ref 3.5–5.2)
ALP SERPL-CCNC: 77 U/L (ref 40–150)
ALT SERPL W P-5'-P-CCNC: 24 U/L (ref 0–50)
ANION GAP SERPL CALCULATED.3IONS-SCNC: 15 MMOL/L (ref 7–15)
AST SERPL W P-5'-P-CCNC: 38 U/L (ref 0–45)
BASOPHILS # BLD AUTO: 0 10E3/UL (ref 0–0.2)
BASOPHILS NFR BLD AUTO: 0 %
BILIRUB SERPL-MCNC: 0.5 MG/DL
BUN SERPL-MCNC: 20.5 MG/DL (ref 8–23)
CALCIUM SERPL-MCNC: 9.6 MG/DL (ref 8.8–10.2)
CHLORIDE SERPL-SCNC: 104 MMOL/L (ref 98–107)
CREAT BLD-MCNC: 1.2 MG/DL (ref 0.5–1)
CREAT SERPL-MCNC: 1.12 MG/DL (ref 0.51–0.95)
DEPRECATED HCO3 PLAS-SCNC: 20 MMOL/L (ref 22–29)
EGFRCR SERPLBLD CKD-EPI 2021: 49 ML/MIN/1.73M2
EGFRCR SERPLBLD CKD-EPI 2021: 54 ML/MIN/1.73M2
EOSINOPHIL # BLD AUTO: 0 10E3/UL (ref 0–0.7)
EOSINOPHIL NFR BLD AUTO: 0 %
ERYTHROCYTE [DISTWIDTH] IN BLOOD BY AUTOMATED COUNT: 13 % (ref 10–15)
GLUCOSE SERPL-MCNC: 163 MG/DL (ref 70–99)
HCT VFR BLD AUTO: 42.6 % (ref 35–47)
HGB BLD-MCNC: 14.4 G/DL (ref 11.7–15.7)
IMM GRANULOCYTES # BLD: 0 10E3/UL
IMM GRANULOCYTES NFR BLD: 0 %
LYMPHOCYTES # BLD AUTO: 0.7 10E3/UL (ref 0.8–5.3)
LYMPHOCYTES NFR BLD AUTO: 10 %
MCH RBC QN AUTO: 33 PG (ref 26.5–33)
MCHC RBC AUTO-ENTMCNC: 33.8 G/DL (ref 31.5–36.5)
MCV RBC AUTO: 98 FL (ref 78–100)
MONOCYTES # BLD AUTO: 0.1 10E3/UL (ref 0–1.3)
MONOCYTES NFR BLD AUTO: 1 %
NEUTROPHILS # BLD AUTO: 6.6 10E3/UL (ref 1.6–8.3)
NEUTROPHILS NFR BLD AUTO: 89 %
NRBC # BLD AUTO: 0 10E3/UL
NRBC BLD AUTO-RTO: 0 /100
PLATELET # BLD AUTO: 224 10E3/UL (ref 150–450)
POTASSIUM SERPL-SCNC: 3.8 MMOL/L (ref 3.4–5.3)
PROT SERPL-MCNC: 7.5 G/DL (ref 6.4–8.3)
RBC # BLD AUTO: 4.37 10E6/UL (ref 3.8–5.2)
SODIUM SERPL-SCNC: 139 MMOL/L (ref 135–145)
WBC # BLD AUTO: 7.4 10E3/UL (ref 4–11)

## 2024-06-07 PROCEDURE — 80053 COMPREHEN METABOLIC PANEL: CPT | Performed by: PATHOLOGY

## 2024-06-07 PROCEDURE — 71260 CT THORAX DX C+: CPT | Mod: MG | Performed by: RADIOLOGY

## 2024-06-07 PROCEDURE — G1010 CDSM STANSON: HCPCS | Performed by: RADIOLOGY

## 2024-06-07 PROCEDURE — 85025 COMPLETE CBC W/AUTO DIFF WBC: CPT

## 2024-06-07 PROCEDURE — 74177 CT ABD & PELVIS W/CONTRAST: CPT | Mod: MG | Performed by: RADIOLOGY

## 2024-06-07 PROCEDURE — 36415 COLL VENOUS BLD VENIPUNCTURE: CPT

## 2024-06-07 PROCEDURE — 80053 COMPREHEN METABOLIC PANEL: CPT

## 2024-06-07 RX ORDER — IOPAMIDOL 755 MG/ML
84 INJECTION, SOLUTION INTRAVASCULAR ONCE
Status: COMPLETED | OUTPATIENT
Start: 2024-06-07 | End: 2024-06-07

## 2024-06-07 RX ADMIN — IOPAMIDOL 84 ML: 755 INJECTION, SOLUTION INTRAVASCULAR at 09:51

## 2024-06-07 NOTE — DISCHARGE INSTRUCTIONS

## 2024-06-07 NOTE — NURSING NOTE
Chief Complaint   Patient presents with    Labs Only    Blood Draw     Labs drawn via PIV placed by U/S.     Labs drawn from PIV placed by U/S. Line flushed with saline.     Velia Scott RN

## 2024-06-10 NOTE — PROGRESS NOTES
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   -  Background  In brief, she noted an abdominal mass at the end of 2013, which was quite large and was resected on 01/28/2014. This tumor was about 26 cm in greatest diameter, high-grade, with necrosis present. In retrospect, she had had a CT scan done about 2 years before that, which was felt to be negative. In 08/2014, repeat imaging revealed a 3.5 cm mass near the psoas, and she received preoperative radiation therapy for this. 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 A new margin was obtained, but this was still positive for liposarcoma. All visible tumor was removed.      A recurrence was noted in the abd and she began lipodox/ifos about 3-19-15 and had 6 cycles; the last in August 2015.  -  She was admitted for partial SBO earlier in 2018.  -  She had a normal stress echo 8-31-21. She also had a normal CT angio.  -        Interval history     She is doing pretty well and is active; goes three times per week to gym.  She broke her ankle in Feb and does not walk as much due to that-happened in [place]; took till April to get better; no cane.  She is also walking the dog on short walks.    She had another SBO since 2023.    She again had a red face and neck the next morning after the CT-lasted most of the day.  She got 1 hive with gadolinium with the brain MRI.       Normal diet.  She is no longer taking KCl and does have the diarrhea.  Still ~7-8 BMs/d; it varies with the diet. She does have chronic diarrhea that depends on what she eats; still an issue. She has a history of gi issues; no imodium now but might consider trying it again-\"scared to use re SBO\"; shes concerned about getting another " "SBO.   She had some colitis episodes in the past.     She does still note a little bit of very mild sciatica on the right sometimes and the neuropathy bothers her feet. Right now she is taking gabapentin at bedtime.-was not able to go off it re foot burning.  .      She had a cystoscopy in Dec 2022 for UTIs.  Last UTI Aug 2022.  She saw urology who felt this might be due to the previous radiation.     More GERD; not taking prilosec regularly but uses Tums w/week.     Vertigo is OK now.  In 2018 she had several episodes of vertigo and developed noise in her R ear requiring hospitalization.  She is not having vertigo now but has R hearing loss and has hearing aids.     She does feel more \"tired\" ; had TSH in [place] that was OK at free T4 1.42, TSH 2.13.  She is taking B12 gummies as that was \"a little low\" in [place].    Mood \"OK\" as her mom and sister  a couple years ago.   Does not want to see anyone on that.      She has a longstanding history of hypothyroidism on replacement T4, and some seasonal allergies. She was also noted to have an antiphospholipid antibody. This was checked because her sister presented with a mesenteric artery clot.    They will be going back to [place] Sept to Nov and again late Dec..    -  Background PMH, FH, SH  PAST MEDICAL HISTORY: She did have a tonsillectomy at age 15 and some sinus surgery in about  along with a tubal pregnancy and a tubal ligation.   ALLERGIES: She does describe 1 hive after IV contrast material about 20 years ago, so she has been taking prednisone and Benadryl for contrast scans. She does have some itching with Dilaudid, but still uses it.   SOCIAL HISTORY: She is a never smoker and does not abuse alcohol or other drugs. 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  -          On exam she appeared comfortable but tired with normal affect.   HEENT full EOMs  NECK no obvious goiter or mass  CHEST no resp " distress  NEURO  normal mentation and speech   PSYCH Mood somewhat teary discussing her mom and sister     -  On 12-1-23 CBC, LFT, GNE OK except creat 1.02, , Hb 14.2    On 6-7-24 creat 1.12, alb 4.7, LFT OK, glu 163, CBC OK though  - not a true FBS    -   I reviewed her new CT images of 6-7-24 and there is no clear recurrence ; the potential inflammation of the distal small bowel seems slightly improved     Formal read:  CT-CAP 6-7-24  IMPRESSION:  1.  No evidence of new or worsening metastatic disease in the chest, abdomen and pelvis.  2.  Improved inflammatory changes along the distal small bowel. Mild wall thickening is noted along the sigmoid colon which is suspected to be due to underdistention but could also suggest mild colitis. No significant surrounding inflammatory changes are   present. Recommend correlation with clinical exam.  3.  Stable pulmonary nodules measuring up to 6 mm in the right lower lobe    -  A/P   We discussed a number of issues.      1-sarcoma. abd dedifferentiated liposarcomq.  CHEIKH now.       She is now almost 9 years out from last chemotherapy (August 2015) for recurrent sarcoma.     We will tentatively restage 6-6 and see her 6-10, but depending on how she is doing in Nov we may see her 12-10 also.  She might like to scan a bit sooner than Dec due to anxiety.    -elevated glucose  Followed by PCP-not true FBS this time     -small bowel inflammation on CT  This seems better; perhaps XRT effect          Contrast allergy requires methylpred; She has steroid Rx for the next scan and takes that.     -intermittent hematuria/UTI  Had cystoscopy in Dec 2022 and follows w urology.  Doing well      Creat is fairly stable.  She will f/u w PCP.  We discussed pushing po before and after CTs     -neuropathy  She will use gabapentin as needed    -GERD  She will use prilosec prn now     -Diarrhea  Stable, f/u w PCP     -hypothyroidisim, hearing loss  Stable f/u w PCP     All questions  were addressed and she will call if other questions arise.    Rob Wiseman M.D.  Professor  Hematology, Oncology and Transplantation

## 2024-06-11 ENCOUNTER — VIRTUAL VISIT (OUTPATIENT)
Dept: ONCOLOGY | Facility: CLINIC | Age: 69
End: 2024-06-11
Attending: INTERNAL MEDICINE
Payer: MEDICARE

## 2024-06-11 VITALS — HEIGHT: 69 IN | WEIGHT: 160 LBS | BODY MASS INDEX: 23.7 KG/M2

## 2024-06-11 DIAGNOSIS — G62.9 NEUROPATHY: ICD-10-CM

## 2024-06-11 DIAGNOSIS — R79.89 ELEVATED SERUM CREATININE: ICD-10-CM

## 2024-06-11 DIAGNOSIS — R73.09 ELEVATED GLUCOSE: ICD-10-CM

## 2024-06-11 DIAGNOSIS — K21.9 GASTROESOPHAGEAL REFLUX DISEASE WITHOUT ESOPHAGITIS: ICD-10-CM

## 2024-06-11 DIAGNOSIS — C49.9 SARCOMA OF SOFT TISSUE (H): Primary | ICD-10-CM

## 2024-06-11 DIAGNOSIS — E03.4 HYPOTHYROIDISM DUE TO ACQUIRED ATROPHY OF THYROID: ICD-10-CM

## 2024-06-11 DIAGNOSIS — Z91.041 CONTRAST MEDIA ALLERGY: ICD-10-CM

## 2024-06-11 DIAGNOSIS — R19.7 DIARRHEA, UNSPECIFIED TYPE: ICD-10-CM

## 2024-06-11 PROCEDURE — 99214 OFFICE O/P EST MOD 30 MIN: CPT | Mod: 95 | Performed by: INTERNAL MEDICINE

## 2024-06-11 NOTE — NURSING NOTE
Is the patient currently in the state of [place]? YES    Visit mode:VIDEO    If the visit is dropped, the patient can be reconnected by: TELEPHONE VISIT: Phone number: [phone]    Will anyone else be joining the visit? Yes, spouse Андрей is there with her.   (If patient encounters technical issues they should call [phone] :[phone])    How would you like to obtain your AVS? MyChart    Are changes needed to the allergy or medication list? No    Are refills needed on medications prescribed by this physician? NO    Reason for visit: RECHECK    Sarah MEHTA

## 2024-06-20 DIAGNOSIS — E03.9 HYPOTHYROIDISM, UNSPECIFIED TYPE: ICD-10-CM

## 2024-06-21 ENCOUNTER — TELEPHONE (OUTPATIENT)
Dept: FAMILY MEDICINE | Facility: CLINIC | Age: 69
End: 2024-06-21
Payer: MEDICARE

## 2024-06-21 RX ORDER — LEVOTHYROXINE SODIUM 75 UG/1
75 TABLET ORAL DAILY
Qty: 90 TABLET | Refills: 1 | Status: SHIPPED | OUTPATIENT
Start: 2024-06-21

## 2024-06-21 NOTE — TELEPHONE ENCOUNTER
Patient calling regarding her levothyroxine refill.    She had her TSH with free 4 done in [place] in march.    She will upload results to [employer] and then we can request refill if provider is OK with those results.    Christine M Klisch, RN

## 2024-08-05 ENCOUNTER — MYC MEDICAL ADVICE (OUTPATIENT)
Dept: FAMILY MEDICINE | Facility: CLINIC | Age: 69
End: 2024-08-05
Payer: MEDICARE

## 2024-08-05 NOTE — LETTER
August 12, 2024      Shilea Hewitt  [address] [place] [address]    Your team at [facility] cares about your health. We have reviewed your chart and based on our findings; we are making the following recommendations to better manage your health.      You are in particular need of attention regarding the following:      PREVENTATIVE VISIT: Annual Medicare Wellness: Schedule an Annual Medicare Wellness Exam. Please call your [facility] clinic to set up your appointment.     If you have already completed these items, please contact the clinic via phone or   MyChart so your care team can review and update your records. Thank you for   choosing [facility] for your healthcare needs. For any questions,   concerns, or to schedule an appointment please contact our clinic.        Healthy Regards,        Your [facility] Care Team

## 2024-08-12 NOTE — TELEPHONE ENCOUNTER
Letter sent    Belinda Bello MA    
Patient Quality Outreach    Patient is due for the following:   Breast Cancer Screening - Mammogram  Physical Annual Wellness Visit    Next Steps:   Schedule a Annual Wellness Visit    Type of outreach:    Sent [employer] message.      Questions for provider review:    None           Belinda Bello CMA        
No

## 2024-08-26 ENCOUNTER — ANCILLARY PROCEDURE (OUTPATIENT)
Dept: MAMMOGRAPHY | Facility: CLINIC | Age: 69
End: 2024-08-26
Attending: FAMILY MEDICINE
Payer: MEDICARE

## 2024-08-26 DIAGNOSIS — Z12.31 VISIT FOR SCREENING MAMMOGRAM: ICD-10-CM

## 2024-08-26 PROCEDURE — 77063 BREAST TOMOSYNTHESIS BI: CPT | Mod: TC | Performed by: RADIOLOGY

## 2024-08-26 PROCEDURE — 77067 SCR MAMMO BI INCL CAD: CPT | Mod: TC | Performed by: RADIOLOGY

## 2024-09-09 ENCOUNTER — TRANSFERRED RECORDS (OUTPATIENT)
Dept: HEALTH INFORMATION MANAGEMENT | Facility: CLINIC | Age: 69
End: 2024-09-09
Payer: MEDICARE

## 2024-09-12 ENCOUNTER — OFFICE VISIT (OUTPATIENT)
Dept: FAMILY MEDICINE | Facility: CLINIC | Age: 69
End: 2024-09-12
Attending: FAMILY MEDICINE
Payer: MEDICARE

## 2024-09-12 VITALS
OXYGEN SATURATION: 99 % | BODY MASS INDEX: 23.55 KG/M2 | WEIGHT: 159 LBS | SYSTOLIC BLOOD PRESSURE: 112 MMHG | DIASTOLIC BLOOD PRESSURE: 74 MMHG | RESPIRATION RATE: 16 BRPM | HEIGHT: 69 IN | TEMPERATURE: 97.6 F | HEART RATE: 61 BPM

## 2024-09-12 DIAGNOSIS — Z29.11 NEED FOR VACCINATION AGAINST RESPIRATORY SYNCYTIAL VIRUS: ICD-10-CM

## 2024-09-12 DIAGNOSIS — Z13.220 LIPID SCREENING: ICD-10-CM

## 2024-09-12 DIAGNOSIS — G62.2 POLYNEUROPATHY DUE TO OTHER TOXIC AGENTS (H): ICD-10-CM

## 2024-09-12 DIAGNOSIS — Z00.00 ENCOUNTER FOR MEDICARE ANNUAL WELLNESS EXAM: Primary | ICD-10-CM

## 2024-09-12 DIAGNOSIS — R53.83 OTHER FATIGUE: ICD-10-CM

## 2024-09-12 DIAGNOSIS — S82.64XS CLOSED NONDISPLACED FRACTURE OF LATERAL MALLEOLUS OF RIGHT FIBULA, SEQUELA: ICD-10-CM

## 2024-09-12 DIAGNOSIS — E03.9 HYPOTHYROIDISM, UNSPECIFIED TYPE: ICD-10-CM

## 2024-09-12 DIAGNOSIS — Z87.440 H/O RECURRENT URINARY TRACT INFECTION: ICD-10-CM

## 2024-09-12 DIAGNOSIS — R73.09 ELEVATED GLUCOSE: ICD-10-CM

## 2024-09-12 LAB
ANION GAP SERPL CALCULATED.3IONS-SCNC: 11 MMOL/L (ref 7–15)
BUN SERPL-MCNC: 20.3 MG/DL (ref 8–23)
CALCIUM SERPL-MCNC: 9.3 MG/DL (ref 8.8–10.4)
CHLORIDE SERPL-SCNC: 106 MMOL/L (ref 98–107)
CHOLEST SERPL-MCNC: 147 MG/DL
CREAT SERPL-MCNC: 1.13 MG/DL (ref 0.51–0.95)
EGFRCR SERPLBLD CKD-EPI 2021: 53 ML/MIN/1.73M2
GLUCOSE SERPL-MCNC: 96 MG/DL (ref 70–99)
HBA1C MFR BLD: 5.5 % (ref 0–5.6)
HCO3 SERPL-SCNC: 25 MMOL/L (ref 22–29)
HDLC SERPL-MCNC: 80 MG/DL
HOLD SPECIMEN: NORMAL
LDLC SERPL CALC-MCNC: 52 MG/DL
NONHDLC SERPL-MCNC: 67 MG/DL
POTASSIUM SERPL-SCNC: 4 MMOL/L (ref 3.4–5.3)
SODIUM SERPL-SCNC: 142 MMOL/L (ref 135–145)
TRIGL SERPL-MCNC: 73 MG/DL
TSH SERPL DL<=0.005 MIU/L-ACNC: 1.59 UIU/ML (ref 0.3–4.2)
VIT B12 SERPL-MCNC: 382 PG/ML (ref 232–1245)

## 2024-09-12 PROCEDURE — 84443 ASSAY THYROID STIM HORMONE: CPT | Performed by: FAMILY MEDICINE

## 2024-09-12 PROCEDURE — 80061 LIPID PANEL: CPT | Mod: GZ | Performed by: FAMILY MEDICINE

## 2024-09-12 PROCEDURE — 80048 BASIC METABOLIC PNL TOTAL CA: CPT | Performed by: FAMILY MEDICINE

## 2024-09-12 PROCEDURE — G0439 PPPS, SUBSEQ VISIT: HCPCS | Performed by: FAMILY MEDICINE

## 2024-09-12 PROCEDURE — 99214 OFFICE O/P EST MOD 30 MIN: CPT | Mod: 25 | Performed by: FAMILY MEDICINE

## 2024-09-12 PROCEDURE — 36415 COLL VENOUS BLD VENIPUNCTURE: CPT | Performed by: FAMILY MEDICINE

## 2024-09-12 PROCEDURE — 83036 HEMOGLOBIN GLYCOSYLATED A1C: CPT | Performed by: FAMILY MEDICINE

## 2024-09-12 PROCEDURE — 82607 VITAMIN B-12: CPT | Performed by: FAMILY MEDICINE

## 2024-09-12 RX ORDER — GABAPENTIN 300 MG/1
CAPSULE ORAL
Qty: 180 CAPSULE | Refills: 3 | Status: SHIPPED | OUTPATIENT
Start: 2024-09-12

## 2024-09-12 RX ORDER — ESTRADIOL 0.1 MG/G
1 CREAM VAGINAL
Qty: 42.5 G | Refills: 5 | Status: SHIPPED | OUTPATIENT
Start: 2024-09-12

## 2024-09-12 ASSESSMENT — PAIN SCALES - GENERAL: PAINLEVEL: NO PAIN (0)

## 2024-09-12 NOTE — PATIENT INSTRUCTIONS
Patient Education   Preventive Care Advice   This is general advice given by our system to help you stay healthy. However, your care team may have specific advice just for you. Please talk to your care team about your preventive care needs.  Nutrition  Eat 5 or more servings of fruits and vegetables each day.  Try wheat bread, brown rice and whole grain pasta (instead of white bread, rice, and pasta).  Get enough calcium and vitamin D. Check the label on foods and aim for 100% of the RDA (recommended daily allowance).  Lifestyle  Exercise at least 150 minutes each week  (30 minutes a day, 5 days a week).  Do muscle strengthening activities 2 days a week. These help control your weight and prevent disease.  No smoking.  Wear sunscreen to prevent skin cancer.  Have a dental exam and cleaning every 6 months.  Yearly exams  See your health care team every year to talk about:  Any changes in your health.  Any medicines your care team has prescribed.  Preventive care, family planning, and ways to prevent chronic diseases.  Shots (vaccines)   HPV shots (up to age 26), if you've never had them before.  Hepatitis B shots (up to age 59), if you've never had them before.  COVID-19 shot: Get this shot when it's due.  Flu shot: Get a flu shot every year.  Tetanus shot: Get a tetanus shot every 10 years.  Pneumococcal, hepatitis A, and RSV shots: Ask your care team if you need these based on your risk.  Shingles shot (for age 50 and up)  General health tests  Diabetes screening:  Starting at age 35, Get screened for diabetes at least every 3 years.  If you are younger than age 35, ask your care team if you should be screened for diabetes.  Cholesterol test: At age 39, start having a cholesterol test every 5 years, or more often if advised.  Bone density scan (DEXA): At age 50, ask your care team if you should have this scan for osteoporosis (brittle bones).  Hepatitis C: Get tested at least once in your life.  STIs (sexually  transmitted infections)  Before age 24: Ask your care team if you should be screened for STIs.  After age 24: Get screened for STIs if you're at risk. You are at risk for STIs (including HIV) if:  You are sexually active with more than one person.  You don't use condoms every time.  You or a partner was diagnosed with a sexually transmitted infection.  If you are at risk for HIV, ask about PrEP medicine to prevent HIV.  Get tested for HIV at least once in your life, whether you are at risk for HIV or not.  Cancer screening tests  Cervical cancer screening: If you have a cervix, begin getting regular cervical cancer screening tests starting at age 21.  Breast cancer scan (mammogram): If you've ever had breasts, begin having regular mammograms starting at age 40. This is a scan to check for breast cancer.  Colon cancer screening: It is important to start screening for colon cancer at age 45.  Have a colonoscopy test every 10 years (or more often if you're at risk) Or, ask your provider about stool tests like a FIT test every year or Cologuard test every 3 years.  To learn more about your testing options, visit:   .  For help making a decision, visit:   https://bit.ly/fa37121.  Prostate cancer screening test: If you have a prostate, ask your care team if a prostate cancer screening test (PSA) at age 55 is right for you.  Lung cancer screening: If you are a current or former smoker ages 50 to 80, ask your care team if ongoing lung cancer screenings are right for you.  For informational purposes only. Not to replace the advice of your health care provider. Copyright   2023 [facility]. All rights reserved. Clinically reviewed by the [facility] Transitions Program. [employer] 996822 - REV 01/24.   Learning About Risk for Heart Attack and Stroke (01:56)  Your health professional recommends that you watch this short online health video.  Learn what raises your risk for having a heart attack or stroke and  how you can lower your risk.   Purpose: Explains risk factors for heart attack and stroke and ways to lower the risk.  Goal: Users will understand what it means to be at risk for having a heart attack or stroke and what they can do to reduce their risk.    Watch: Scan the QR code or visit the link to view video       https://hwi.se/[email or web address]  Current as of: June 24, 2023  Content Version: 14.1 2006-2024 [employer].   Care instructions adapted under license by your healthcare professional. If you have questions about a medical condition or this instruction, always ask your healthcare professional. [employer] disclaims any warranty or liability for your use of this information.       Patient Education   Preventive Care Advice   This is general advice given by our system to help you stay healthy. However, your care team may have specific advice just for you. Please talk to your care team about your preventive care needs.  Nutrition  Eat 5 or more servings of fruits and vegetables each day.  Try wheat bread, brown rice and whole grain pasta (instead of white bread, rice, and pasta).  Get enough calcium and vitamin D. Check the label on foods and aim for 100% of the RDA (recommended daily allowance).  Lifestyle  Exercise at least 150 minutes each week  (30 minutes a day, 5 days a week).  Do muscle strengthening activities 2 days a week. These help control your weight and prevent disease.  No smoking.  Wear sunscreen to prevent skin cancer.  Have a dental exam and cleaning every 6 months.  Yearly exams  See your health care team every year to talk about:  Any changes in your health.  Any medicines your care team has prescribed.  Preventive care, family planning, and ways to prevent chronic diseases.  Shots (vaccines)   HPV shots (up to age 26), if you've never had them before.  Hepatitis B shots (up to age 59), if you've never had them before.  COVID-19 shot: Get this shot when it's  due.  Flu shot: Get a flu shot every year.  Tetanus shot: Get a tetanus shot every 10 years.  Pneumococcal, hepatitis A, and RSV shots: Ask your care team if you need these based on your risk.  Shingles shot (for age 50 and up)  General health tests  Diabetes screening:  Starting at age 35, Get screened for diabetes at least every 3 years.  If you are younger than age 35, ask your care team if you should be screened for diabetes.  Cholesterol test: At age 39, start having a cholesterol test every 5 years, or more often if advised.  Bone density scan (DEXA): At age 50, ask your care team if you should have this scan for osteoporosis (brittle bones).  Hepatitis C: Get tested at least once in your life.  STIs (sexually transmitted infections)  Before age 24: Ask your care team if you should be screened for STIs.  After age 24: Get screened for STIs if you're at risk. You are at risk for STIs (including HIV) if:  You are sexually active with more than one person.  You don't use condoms every time.  You or a partner was diagnosed with a sexually transmitted infection.  If you are at risk for HIV, ask about PrEP medicine to prevent HIV.  Get tested for HIV at least once in your life, whether you are at risk for HIV or not.  Cancer screening tests  Cervical cancer screening: If you have a cervix, begin getting regular cervical cancer screening tests starting at age 21.  Breast cancer scan (mammogram): If you've ever had breasts, begin having regular mammograms starting at age 40. This is a scan to check for breast cancer.  Colon cancer screening: It is important to start screening for colon cancer at age 45.  Have a colonoscopy test every 10 years (or more often if you're at risk) Or, ask your provider about stool tests like a FIT test every year or Cologuard test every 3 years.  To learn more about your testing options, visit:   .  For help making a decision, visit:   https://bit.ly/al27653.  Prostate cancer screening  test: If you have a prostate, ask your care team if a prostate cancer screening test (PSA) at age 55 is right for you.  Lung cancer screening: If you are a current or former smoker ages 50 to 80, ask your care team if ongoing lung cancer screenings are right for you.  For informational purposes only. Not to replace the advice of your health care provider. Copyright   2023 [facility]. All rights reserved. Clinically reviewed by the [facility] Transitions Program. [employer] 744378 - REV 01/24.   Learning About Risk for Heart Attack and Stroke (01:56)  Your health professional recommends that you watch this short online health video.  Learn what raises your risk for having a heart attack or stroke and how you can lower your risk.   Purpose: Explains risk factors for heart attack and stroke and ways to lower the risk.  Goal: Users will understand what it means to be at risk for having a heart attack or stroke and what they can do to reduce their risk.    Watch: Scan the QR code or visit the link to view video       https://hwi.se/[email or web address]  Current as of: June 24, 2023  Content Version: 14.1    2318-6839 [employer].   Care instructions adapted under license by your healthcare professional. If you have questions about a medical condition or this instruction, always ask your healthcare professional. [employer] disclaims any warranty or liability for your use of this information.

## 2024-09-12 NOTE — PROGRESS NOTES
Preventive Care Visit  [facility]  Mindyalisia Vieira DO, Family Medicine  Sep 12, 2024      Assessment & Plan     Encounter for Medicare annual wellness exam      Hypothyroidism, unspecified type  The current medical regimen is effective;  continue present plan and medications.    - TSH WITH FREE T4 REFLEX; Future  - TSH WITH FREE T4 REFLEX    Elevated glucose  The current medical regimen is effective;  continue present plan and medications.    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    - gabapentin (NEURONTIN) 300 MG capsule; Take one tab po in the am and two tabs po about 30 mins before bed    Lipid screening    - Lipid panel reflex to direct LDL Fasting; Future  - Lipid panel reflex to direct LDL Fasting    H/O recurrent urinary tract infection  The current medical regimen is effective;  continue present plan and medications.    - estradiol (ESTRACE) 0.1 MG/GM vaginal cream; Place 1 g vaginally twice a week.    Other fatigue    Patient states her B 12 was low in the past   - Vitamin B12; Future  - Vitamin B12    Closed nondisplaced fracture of lateral malleolus of right fibula, sequela  Still painful and tender to the touch so she needs to see ortho to determine if the fracture healed properly   - Orthopedic  Referral; Future    Need for vaccination against respiratory syncytial virus    - RSV vaccine, bivalent, ABRYSVO, injection; Inject 0.5 mLs into the muscle once for 1 dose. Pharmacist administered            Counseling  Appropriate preventive services were addressed with this patient via screening, questionnaire, or discussion as appropriate for fall prevention, nutrition, physical activity, Tobacco-use  cessation, social engagement, weight loss and cognition.  Checklist reviewing preventive services available has been given to the patient.  Reviewed patient's diet, addressing concerns and/or questions.   She is at risk for lack of exercise and has been provided with information to increase physical activity for the benefit of her well-being.   Discussed possible causes of fatigue. The patient was provided with written information regarding signs of hearing loss.       Follow up yearly for CPE    Subjective   Shiela is a 69 year old, presenting for the following:  Physical        9/12/2024    10:32 AM   Additional Questions   Roomed by Belinda HE   Accompanied by self         9/12/2024    10:32 AM   Patient Reported Additional Medications   Patient reports taking the following new medications none         Via the Health Maintenance questionnaire, the patient has reported the following services have been completed -Eye Exam: [facility] 2023-[facility], this information has been sent to the abstraction team.    Health Care Directive  Patient does not have a Health Care Directive or Living Will: Patient states has Advance Directive and will bring in a copy to clinic.    HPI    -- Follow up on broken right ankle 2/5/24.  She had a lateral malleolar fracture.  She continues to have mid foot pain.      -- Question on RSV vaccine ?     -- Sleep issues - can't stay asleep- benadryl just dries out nostril.  This has been for many years.  She still gets occasional hot flashes but that doesn't seem to bother her.  She is waking up at 3:15 am.        -- Question on estradiol - was given this for UTI by urology- no more issues so wanting to get refills through PCP      Elevated blood sugar Follow-up    How often are you checking your blood sugar? Not at all- not covered by insurance   What concerns do you have today about your diabetes? None   Do you have any of these symptoms? (Select all that apply)  No numbness or tingling in  feet.  No redness, sores or blisters on feet.  No complaints of excessive thirst.  No reports of blurry vision.  No significant changes to weight.      BP Readings from Last 2 Encounters:   09/12/24 112/74   08/03/23 102/66     Hemoglobin A1C (%)   Date Value   09/12/2024 5.5   12/01/2023 5.6   11/23/2020 5.2   06/12/2020 5.4     LDL Cholesterol Calculated (mg/dL)   Date Value   06/09/2023 66   06/10/2022 47   11/23/2020 71   11/27/2018 41             Hypothyroidism Follow-up    Since last visit, patient describes the following symptoms: Weight stable, no hair loss, no skin changes, no constipation, no loose stools  TSH   Date Value Ref Range Status   06/09/2023 3.79 0.30 - 4.20 uIU/mL Final   06/10/2022 0.69 0.40 - 4.00 mU/L Final   05/21/2021 0.92 0.40 - 4.00 mU/L Final             9/9/2024   General Health   How would you rate your overall physical health? Good   Feel stress (tense, anxious, or unable to sleep) Very much      (!) STRESS CONCERN      9/9/2024   Nutrition   Diet: Low salt            9/9/2024   Exercise   Days per week of moderate/strenous exercise 3 days   Average minutes spent exercising at this level 60 min            9/9/2024   Social Factors   Frequency of gathering with friends or relatives Once a week   Worry food won't last until get money to buy more No   Food not last or not have enough money for food? No   Do you have housing? (Housing is defined as stable permanent housing and does not include staying ouside in a car, in a tent, in an abandoned building, in an overnight shelter, or couch-surfing.) Yes   Are you worried about losing your housing? No   Lack of transportation? No   Unable to get utilities (heat,electricity)? No            9/12/2024   Fall Risk   Fallen 2 or more times in the past year? Yes   Trouble with walking or balance? Yes   Gait Speed Test (Document in seconds) 4.31               9/9/2024   Activities of Daily Living- Home Safety   Needs help with the following daily  activites None of the above   Safety concerns in the home None of the above            9/9/2024   Dental   Dentist two times every year? Yes            9/9/2024   Hearing Screening   Hearing concerns? (!) I NEED TO ASK PEOPLE TO SPEAK UP OR REPEAT THEMSELVES.    (!) IT'S HARD TO FOLLOW A CONVERSATION IN A NOISY RESTAURANT OR CROWDED ROOM.       Multiple values from one day are sorted in reverse-chronological order         9/9/2024   Driving Risk Screening   Patient/family members have concerns about driving No            9/9/2024   General Alertness/Fatigue Screening   Have you been more tired than usual lately? (!) YES            9/9/2024   Urinary Incontinence Screening   Bothered by leaking urine in past 6 months No              Today's PHQ-2 Score:       9/12/2024    10:33 AM   PHQ-2 ( 1999 Pf[employer])   Q1: Little interest or pleasure in doing things 0   Q2: Feeling down, depressed or hopeless 0   PHQ-2 Score 0   Q1: Little interest or pleasure in doing things Not at all   Q2: Feeling down, depressed or hopeless Not at all   PHQ-2 Score 0         9/9/2024   Substance Use   Alcohol more than 3/day or more than 7/wk No   Do you have a current opioid prescription? No   How severe/bad is pain from 1 to 10? 0/10 (No Pain)   Do you use any other substances recreationally? No        Social History     Tobacco Use    Smoking status: Never     Passive exposure: Never    Smokeless tobacco: Never   Vaping Use    Vaping status: Never Used   Substance Use Topics    Alcohol use: Yes     Comment: not much    Drug use: No           8/26/2024   LAST FHS-7 RESULTS   1st degree relative breast or ovarian cancer No   Any relative bilateral breast cancer No   Any male have breast cancer No   Any ONE woman have BOTH breast AND ovarian cancer No   Any woman with breast cancer before 50yrs No   2 or more relatives with breast AND/OR ovarian cancer No   2 or more relatives with breast AND/OR bowel cancer No           Mammogram Screening -  Mammogram every 1-2 years updated in Health Maintenance based on mutual decision making      History of abnormal Pap smear: No - age 65 or older with adequate negative prior screening test results (3 consecutive negative cytology results, 2 consecutive negative cotesting results, or 2 consecutive negative HrHPV test results within 10 years, with the most recent test occurring within the recommended screening interval for the test used)       ASCVD Risk   The 10-year ASCVD risk score (Susy DK, et al., 2019) is: 9.2%    Values used to calculate the score:      Age: 69 years      Sex: Female      Is Non- : No      Diabetic: Yes      Tobacco smoker: No      Systolic Blood Pressure: 112 mmHg      Is BP treated: No      HDL Cholesterol: 89 mg/dL      Total Cholesterol: 166 mg/dL            Reviewed and updated as needed this visit by Provider                      Current providers sharing in care for this patient include:  Patient Care Team:  Mindy Vieira DO as PCP - General (Family Practice)  Sergio Haider MD as MD (General Surgery)  Mindy Vieira DO as Assigned PCP  Pearl Alberto RN as Nurse Coordinator (Oncology)  Rob Wiseman MD as Assigned Cancer Care Provider  SURENDRA Mason MD as MD (Cardiovascular Disease)    The following health maintenance items are reviewed in Epic and correct as of today:  Health Maintenance   Topic Date Due    EYE EXAM  11/29/2023    LIPID  06/09/2024    TSH W/FREE T4 REFLEX  06/09/2024    MEDICARE ANNUAL WELLNESS VISIT  08/03/2024    ANNUAL REVIEW OF HM ORDERS  08/03/2024    COVID-19 Vaccine (6 - 2023-24 season) 09/01/2024    A1C  03/12/2025    BMP  06/07/2025    MAMMO SCREENING  08/26/2025    FALL RISK ASSESSMENT  09/12/2025    COLORECTAL CANCER SCREENING  09/21/2026    ADVANCE CARE PLANNING  08/03/2028    RSV VACCINE (1 - 1-dose 75+ series) 07/26/2031    DTAP/TDAP/TD IMMUNIZATION (6 - Td or Tdap) 08/01/2032    DEXA  12/08/2036     "HEPATITIS C SCREENING  Completed    PHQ-2 (once per calendar year)  Completed    INFLUENZA VACCINE  Completed    Pneumococcal Vaccine: 65+ Years  Completed    ZOSTER IMMUNIZATION  Completed    HPV IMMUNIZATION  Aged Out    MENINGITIS IMMUNIZATION  Aged Out    RSV MONOCLONAL ANTIBODY  Aged Out    MICROALBUMIN  Discontinued    DIABETIC FOOT EXAM  Discontinued    PAP  Discontinued         Review of Systems  Constitutional, HEENT, cardiovascular, pulmonary, gi and gu systems are negative, except as otherwise noted.     Objective    Exam  /74 (BP Location: Right arm, Patient Position: Sitting, Cuff Size: Adult Regular)   Pulse 61   Temp 97.6  F (36.4  C) (Oral)   Resp 16   Ht 1.74 m (5' 8.5\")   Wt 72.1 kg (159 lb)   SpO2 99%   BMI 23.82 kg/m     Estimated body mass index is 23.82 kg/m  as calculated from the following:    Height as of this encounter: 1.74 m (5' 8.5\").    Weight as of this encounter: 72.1 kg (159 lb).    Physical Exam  GENERAL: alert and no distress  EYES: Eyes grossly normal to inspection, PERRL and conjunctivae and sclerae normal  HENT: ear canals and TM's normal, nose and mouth without ulcers or lesions  NECK: no adenopathy, no asymmetry, masses, or scars  RESP: lungs clear to auscultation - no rales, rhonchi or wheezes  BREAST: normal without masses, tenderness or nipple discharge and no palpable axillary masses or adenopathy  CV: regular rate and rhythm, normal S1 S2, no S3 or S4, no murmur, click or rub, no peripheral edema  ABDOMEN: soft, nontender, no hepatosplenomegaly, no masses and bowel sounds normal  MS: Right ankle exam, non-tender medial malleoli, tender lateral malleoli, non tender mid foot and forefoot,  Full ROM, ligaments non-tender, no swelling or bruising   SKIN: no suspicious lesions or rashes  NEURO: Normal strength and tone, mentation intact and speech normal  PSYCH: mentation appears normal, affect normal/bright         9/12/2024   Mini Cog   Clock Draw Score 2 Normal "   3 Item Recall 3 objects recalled   Mini Cog Total Score 5                 Signed Electronically by: Mindy Vieira DO

## 2024-09-23 ENCOUNTER — OFFICE VISIT (OUTPATIENT)
Dept: ORTHOPEDICS | Facility: CLINIC | Age: 69
End: 2024-09-23
Attending: FAMILY MEDICINE
Payer: MEDICARE

## 2024-09-23 ENCOUNTER — ANCILLARY PROCEDURE (OUTPATIENT)
Dept: GENERAL RADIOLOGY | Facility: CLINIC | Age: 69
End: 2024-09-23
Attending: PEDIATRICS
Payer: MEDICARE

## 2024-09-23 DIAGNOSIS — S82.64XS CLOSED NONDISPLACED FRACTURE OF LATERAL MALLEOLUS OF RIGHT FIBULA, SEQUELA: ICD-10-CM

## 2024-09-23 DIAGNOSIS — M72.2 PLANTAR FASCIITIS: Primary | ICD-10-CM

## 2024-09-23 PROCEDURE — 73630 X-RAY EXAM OF FOOT: CPT | Mod: TC | Performed by: RADIOLOGY

## 2024-09-23 PROCEDURE — 99204 OFFICE O/P NEW MOD 45 MIN: CPT | Performed by: PEDIATRICS

## 2024-09-23 PROCEDURE — 73610 X-RAY EXAM OF ANKLE: CPT | Mod: TC | Performed by: RADIOLOGY

## 2024-09-23 NOTE — PROGRESS NOTES
ASSESSMENT & PLAN    Sheila was seen today for pain and pain.    Diagnoses and all orders for this visit:    Plantar fasciitis  -     Physical Therapy  Referral; Future  -     Physical Therapy  Referral; Future  -     Ankle/Foot Bracing Supplies Order Foot Insert; Bilateral    Closed nondisplaced fracture of lateral malleolus of right fibula, sequela  -     Orthopedic  Referral  -     XR Ankle Right G/E 3 Views  -     Cancel: XR Foot Left G/E 3 Views  -     XR Foot Right G/E 3 Views  -     Physical Therapy  Referral; Future  -     Physical Therapy  Referral; Future  -     Ankle/Foot Bracing Supplies Order Foot Insert; Bilateral      This issue is acute on chronic and Unchanged.        ICD-10-CM    1. Plantar fasciitis  M72.2 Physical Therapy  Referral     Physical Therapy  Referral     Ankle/Foot Bracing Supplies Order Foot Insert; Bilateral      2. Closed nondisplaced fracture of lateral malleolus of right fibula, sequela  S82.64XS Orthopedic  Referral     XR Ankle Right G/E 3 Views     XR Foot Right G/E 3 Views     Physical Therapy  Referral     Physical Therapy  Referral     Ankle/Foot Bracing Supplies Order Foot Insert; Bilateral     CANCELED: XR Foot Left G/E 3 Views        Patient Instructions   We discussed these other possible diagnosis: Fracture appears healed, symptoms more likely plantar fasciitis currently.    Discussed pathophysiology of this condition and implications.  Questions answered. Inserts or heel cup recommended. Good walking shoes or running shoes recommended for activities.  Minimize high-impact activities. Stretching twice daily. Ice after activity. We discussed possibly further treatment including night splints, CAM Boot immobilization, physical therapy or podiatry referral.    Plan:  - Today's Plan of Care:  Paige Inserts - 9  Referral placed to PT  Home Exercise Program to start    Discussed  activity considerations and other supportive care including Ice/Heat, OTC and other topical medications as needed.    -We also discussed other future treatment options:  MRI or CT if not improving    Follow Up: 6 - 8 weeks and as needed    Concerning signs and symptoms were reviewed and all questions were answered at this time.    Thanks for the opportunity to participate in the care of this patient, I will keep you updated on their progress.    CC: Mindy Rodgers MD [facility]  Sports Medicine Physician  [facility]    -----  Chief Complaint   Patient presents with    Right Ankle - Pain    Right Foot - Pain       SUBJECTIVE  Shiela Hewitt is a/an 69 year old female who is seen in consultation at the request of  Mindy Vieira D.O. for evaluation of right ankle, distal fibula.    The patient is seen by themselves.    Onset: 2/5/24. Patient describes injury as tripping while getting out of the car, her right ankle inverted.   - Was in a walking boot for 6 weeks, still having pain.    Location of Pain: calcaneous >> right ankle, medial and lateral ankle   Worsened by: pressure on the foot, walking, getting out of bed in the AM, hurts all day  Better with: not sure, nothing   Treatments tried: ice, heat, Tylenol, previous imaging (x-ray previous done at [facility] in [facility] 4/1/24), casting/splinting/bracing  Associated symptoms: numbness, tingling, and painful, achy pain (calcaneous)     Orthopedic/Surgical history: YES - Date: she has foot numbness d/t chemotherapy   Social History/Occupation: retired       REVIEW OF SYSTEMS:  Review of Systems    OBJECTIVE:  There were no vitals taken for this visit.   General: healthy, alert and in no distress  Skin: no suspicious lesions or rash.  CV: distal perfusion intact   Resp: normal respiratory effort without conversational dyspnea   Psych: normal mood and affect  Gait: NORMAL  Neuro: Normal light sensory exam of lower  extremity    Right Foot and Ankle Exam:    Inspection:       no visible ecchymosis       no visible edema or effusion    Foot inspection:       pes planus       ankle pronation    Tender:       Plantar fascial, heel pain    Non-Tender:       remainder of ankle and foot bilateral    ROM:        Full active and passive ROM, ankle dorsiflexion, plantarflexion, inversion, eversion, great toe dorsiflexion, remainder of toes, midfoot and subtalar bilateral    Strength:       ankle dorsiflexion 5/5 bilateral       plantarflexion 5/5 bilateral       inversion 5/5 bilateral       eversion 5/5 bilateral    Special Tests:       neg (-) anterior drawer bilateral       neg (-) talar tilt bilateral    Gait:       normal    Neurovascular:       2+ peripheral pulses bilaterally and brisk capillary refill       sensation grossly intact    RADIOLOGY:  Final results and radiologist's interpretation, available in the [facility] health record.  Images were reviewed with the patient in the office today.  My personal interpretation of the performed imaging:     3 XR views of right ankle reviewed: fibular fracture appears healed, no significant degenerative change  - will follow official read    Reviewed PCP note, reviewed OSH notes from [place]  Review of the result(s) of each unique test - XR

## 2024-09-23 NOTE — LETTER
9/23/2024      Shiela Hewitt  [address]   [place] [address]      Dear Colleague,    Thank you for referring your patient, Shiela Hewitt, to the [facility]. Please see a copy of my visit note below.    ASSESSMENT & PLAN    Shiela was seen today for pain and pain.    Diagnoses and all orders for this visit:    Plantar fasciitis  -     Physical Therapy  Referral; Future  -     Physical Therapy  Referral; Future  -     Ankle/Foot Bracing Supplies Order Foot Insert; Bilateral    Closed nondisplaced fracture of lateral malleolus of right fibula, sequela  -     Orthopedic  Referral  -     XR Ankle Right G/E 3 Views  -     Cancel: XR Foot Left G/E 3 Views  -     XR Foot Right G/E 3 Views  -     Physical Therapy  Referral; Future  -     Physical Therapy  Referral; Future  -     Ankle/Foot Bracing Supplies Order Foot Insert; Bilateral      This issue is acute on chronic and Unchanged.        ICD-10-CM    1. Plantar fasciitis  M72.2 Physical Therapy  Referral     Physical Therapy  Referral     Ankle/Foot Bracing Supplies Order Foot Insert; Bilateral      2. Closed nondisplaced fracture of lateral malleolus of right fibula, sequela  S82.64XS Orthopedic  Referral     XR Ankle Right G/E 3 Views     XR Foot Right G/E 3 Views     Physical Therapy  Referral     Physical Therapy  Referral     Ankle/Foot Bracing Supplies Order Foot Insert; Bilateral     CANCELED: XR Foot Left G/E 3 Views        Patient Instructions   We discussed these other possible diagnosis: Fracture appears healed, symptoms more likely plantar fasciitis currently.    Discussed pathophysiology of this condition and implications.  Questions answered. Inserts or heel cup recommended. Good walking shoes or running shoes recommended for activities.  Minimize high-impact activities. Stretching twice daily. Ice after activity. We  discussed possibly further treatment including night splints, CAM Boot immobilization, physical therapy or podiatry referral.    Plan:  - Today's Plan of Care:  Herbertht Inserts - 9  Referral placed to PT  Home Exercise Program to start    Discussed activity considerations and other supportive care including Ice/Heat, OTC and other topical medications as needed.    -We also discussed other future treatment options:  MRI or CT if not improving    Follow Up: 6 - 8 weeks and as needed    Concerning signs and symptoms were reviewed and all questions were answered at this time.    Thanks for the opportunity to participate in the care of this patient, I will keep you updated on their progress.    CC: Mindy Rodgers MD [facility]  Sports Medicine Physician  [facility]    -----  Chief Complaint   Patient presents with     Right Ankle - Pain     Right Foot - Pain       SUBJECTIVE  Shiela Hewitt is a/an 69 year old female who is seen in consultation at the request of  Mindy Vieira D.O. for evaluation of right ankle, distal fibula.    The patient is seen by themselves.    Onset: 2/5/24. Patient describes injury as tripping while getting out of the car, her right ankle inverted.   - Was in a walking boot for 6 weeks, still having pain.    Location of Pain: calcaneous >> right ankle, medial and lateral ankle   Worsened by: pressure on the foot, walking, getting out of bed in the AM, hurts all day  Better with: not sure, nothing   Treatments tried: ice, heat, Tylenol, previous imaging (x-ray previous done at [facility] in [facility] 4/1/24), casting/splinting/bracing  Associated symptoms: numbness, tingling, and painful, achy pain (calcaneous)     Orthopedic/Surgical history: YES - Date: she has foot numbness d/t chemotherapy   Social History/Occupation: retired       REVIEW OF SYSTEMS:  Review of Systems    OBJECTIVE:  There were no vitals taken for this visit.   General:  healthy, alert and in no distress  Skin: no suspicious lesions or rash.  CV: distal perfusion intact   Resp: normal respiratory effort without conversational dyspnea   Psych: normal mood and affect  Gait: NORMAL  Neuro: Normal light sensory exam of lower extremity    Right Foot and Ankle Exam:    Inspection:       no visible ecchymosis       no visible edema or effusion    Foot inspection:       pes planus       ankle pronation    Tender:       Plantar fascial, heel pain    Non-Tender:       remainder of ankle and foot bilateral    ROM:        Full active and passive ROM, ankle dorsiflexion, plantarflexion, inversion, eversion, great toe dorsiflexion, remainder of toes, midfoot and subtalar bilateral    Strength:       ankle dorsiflexion 5/5 bilateral       plantarflexion 5/5 bilateral       inversion 5/5 bilateral       eversion 5/5 bilateral    Special Tests:       neg (-) anterior drawer bilateral       neg (-) talar tilt bilateral    Gait:       normal    Neurovascular:       2+ peripheral pulses bilaterally and brisk capillary refill       sensation grossly intact    RADIOLOGY:  Final results and radiologist's interpretation, available in the [facility] health record.  Images were reviewed with the patient in the office today.  My personal interpretation of the performed imaging:     3 XR views of right ankle reviewed: fibular fracture appears healed, no significant degenerative change  - will follow official read    Reviewed PCP note, reviewed OSH notes from [place]  Review of the result(s) of each unique test - XR             Again, thank you for allowing me to participate in the care of your patient.        Sincerely,        Amelie Rodgers MD

## 2024-09-23 NOTE — PATIENT INSTRUCTIONS
We discussed these other possible diagnosis: Fracture appears healed, symptoms more likely plantar fasciitis currently.    Discussed pathophysiology of this condition and implications.  Questions answered. Inserts or heel cup recommended. Good walking shoes or running shoes recommended for activities.  Minimize high-impact activities. Stretching twice daily. Ice after activity. We discussed possibly further treatment including night splints, CAM Boot immobilization, physical therapy or podiatry referral.    Plan:  - Today's Plan of Care:  Paige Inserts - 9  Referral placed to PT  Home Exercise Program to start    Discussed activity considerations and other supportive care including Ice/Heat, OTC and other topical medications as needed.    -We also discussed other future treatment options:  MRI or CT if not improving    Follow Up: 6 - 8 weeks and as needed    If you have any further questions for your physician or physician s care team you can call [phone].

## 2024-11-10 ASSESSMENT — ACTIVITIES OF DAILY LIVING (ADL)
SHOPPING: A LITTLE BIT OF DIFFICULTY
PUTTING_ON_YOUR_SHOES_OR_SOCKS: NO DIFFICULTY
WALKING_A_MILE: EXTREME DIFFICULTY OR UNABLE TO PERFORM ACTIVITY
YOUR_USUAL_HOBBIES,_RECREATIONAL_OR_SPORTING_ACTIVITIES: A LITTLE BIT OF DIFFICULTY
SQUATTING: NO DIFFICULTY
LEFS_RAW_SCORE: 0
WALKING_BETWEEN_ROOMS: A LITTLE BIT OF DIFFICULTY
PERFORMING_HEAVY_ACTIVITIES_AROUND_YOUR_HOME: A LITTLE BIT OF DIFFICULTY
MAKING_SHARP_TURNS_WHILE_RUNNING_FAST: EXTREME DIFFICULTY OR UNABLE TO PERFORM ACTIVITY
SITTING_FOR_1_HOUR: NO DIFFICULTY
ANY_OF_YOUR_USUAL_WORK,_HOUSEWORK_OR_SCHOOL_ACTIVITIES: A LITTLE BIT OF DIFFICULTY
PLEASE_INDICATE_YOR_PRIMARY_REASON_FOR_REFERRAL_TO_THERAPY:: FOOT AND/OR ANKLE
RUNNING_ON_UNEVEN_GROUND: EXTREME DIFFICULTY OR UNABLE TO PERFORM ACTIVITY
STANDING_FOR_1_HOUR: A LITTLE BIT OF DIFFICULTY
PERFORMING_LIGHT_ACTIVITIES_AROUND_YOUR_HOME: NO DIFFICULTY
GOING_UP_OR_DOWN_10_STAIRS: A LITTLE BIT OF DIFFICULTY
LIFTING_AN_OBJECT,_LIKE_A_BAG_OF_GROCERIES_FROM_THE_FLOOR: NO DIFFICULTY
WALKING_2_BLOCKS: MODERATE DIFFICULTY
RUNNING_ON_EVEN_GROUND: EXTREME DIFFICULTY OR UNABLE TO PERFORM ACTIVITY
ROLLING_OVER_IN_BED: NO DIFFICULTY
GETTING_INTO_OR_OUT_OF_A_CAR: NO DIFFICULTY
GETTING_INTO_AND_OUT_OF_A_BATH: NO DIFFICULTY
LEFS_SCORE(%): 0

## 2024-11-13 ENCOUNTER — THERAPY VISIT (OUTPATIENT)
Dept: PHYSICAL THERAPY | Facility: CLINIC | Age: 69
End: 2024-11-13
Attending: PEDIATRICS
Payer: MEDICARE

## 2024-11-13 ENCOUNTER — E-VISIT (OUTPATIENT)
Dept: URGENT CARE | Facility: CLINIC | Age: 69
End: 2024-11-13
Payer: MEDICARE

## 2024-11-13 DIAGNOSIS — M79.671 RIGHT FOOT PAIN: Primary | ICD-10-CM

## 2024-11-13 DIAGNOSIS — M72.2 PLANTAR FASCIITIS: ICD-10-CM

## 2024-11-13 DIAGNOSIS — S82.64XS CLOSED NONDISPLACED FRACTURE OF LATERAL MALLEOLUS OF RIGHT FIBULA, SEQUELA: ICD-10-CM

## 2024-11-13 DIAGNOSIS — N39.0 ACUTE UTI (URINARY TRACT INFECTION): Primary | ICD-10-CM

## 2024-11-13 PROCEDURE — 97110 THERAPEUTIC EXERCISES: CPT | Mod: GP

## 2024-11-13 PROCEDURE — 97161 PT EVAL LOW COMPLEX 20 MIN: CPT | Mod: GP

## 2024-11-13 RX ORDER — NITROFURANTOIN 25; 75 MG/1; MG/1
100 CAPSULE ORAL 2 TIMES DAILY
Qty: 10 CAPSULE | Refills: 0 | Status: SHIPPED | OUTPATIENT
Start: 2024-11-13 | End: 2024-11-18

## 2024-11-13 NOTE — PROGRESS NOTES
PHYSICAL THERAPY EVALUATION  Type of Visit: Evaluation       Fall Risk Screen:  Fall screen completed by: PT  Have you fallen 2 or more times in the past year?: No  Have you fallen and had an injury in the past year?: No  Is patient a fall risk?: No    Subjective         Presenting condition or subjective complaint: (Patient-Rptd) plantar fasciitis    Pt presents to PT with R foot pain. Has a history of a R distal fibula fracture which has shown to be fully healed. Tripped on purse and stepped weird out of the car. Lots of bruising and swelling and pain. Once it healed it was fine.    Was doing crossfit and was doing heelwalks and thinks this mightve started triggered pain.    Pts current complaint is heel pain on inside and under side of heel, using blue superfeet which seem to help.    Worst: walking on flat ground (especially with no shoes),     Better: stretches, rolling ball out    Pt will be going to [place] after christmas.     Date of onset: 09/23/24 (order date)    Relevant medical history: (Patient-Rptd) Implanted device   Dates & types of surgery: (Patient-Rptd) tonsillectomy 10/70, sinus,1/23/91, laparotomy 7/85,rt foot neuroma 3/06, abdominal surg 2X 1/28/14,12/3/14    Prior diagnostic imaging/testing results: (Patient-Rptd) X-ray     Prior therapy history for the same diagnosis, illness or injury: (Patient-Rptd) No      Living Environment  Social support: (Patient-Rptd) With a significant other or spouse   Type of home: (Patient-Rptd) House; 1 level   Stairs to enter the home: (Patient-Rptd) No       Ramp: (Patient-Rptd) No   Stairs inside the home: (Patient-Rptd) No       Help at home: (Patient-Rptd) None; Home and Yard maintenance tasks  Equipment owned:       Employment: (Patient-Rptd) No    Hobbies/Interests: (Patient-Rptd) reading, golf,pickleball    Patient goals for therapy: (Patient-Rptd) walk longer without pain    Pain assessment:      Objective   FOOT/ANKLE EVALUATION    INTEGUMENTARY  (edema, incisions): WFL  POSTURE:  medium to high arch on RLE  GAIT:   Reduced pushoff RLE  BALANCE/PROPRIOCEPTION: Single Leg Stance Eyes Open (seconds): 7sec on R, mod trunk sway    ROM: WFL, no pain with OP into DF/PF    STRENGTH:  gastroc 5/5, SL heel raise endurance 12 on R, 16 on L  FLEXIBILITY:    CKC wall DF: 9.5cm on L, 10.5cm    [place]  [place]  FUNCTIONAL TESTS: Double Leg Squat: Anterior knee translation, Knee valgus, Hip internal rotation, and Improper use of glutes/hips  PALPATION:  tenderness medial calcaneal tuberble  JOINT MOBILITY:  reduced midfoot ever/inv    Assessment & Plan   CLINICAL IMPRESSIONS  Medical Diagnosis: R foot pain    Treatment Diagnosis: R foot pain   Impression/Assessment: Patient is a 69 year old female with R foot complaints.  The following significant findings have been identified: Pain, Decreased joint mobility, Decreased strength, and Impaired balance. These impairments interfere with their ability to perform self care tasks, recreational activities, household chores, driving , household mobility, and community mobility as compared to previous level of function.     Clinical Decision Making (Complexity):  Clinical Presentation: Stable/Uncomplicated  Clinical Presentation Rationale: based on medical and personal factors listed in PT evaluation  Clinical Decision Making (Complexity): Low complexity    PLAN OF CARE  Treatment Interventions:  Interventions: Manual Therapy, Neuromuscular Re-education, Therapeutic Activity, Therapeutic Exercise    Long Term Goals     PT Goal 1  Goal Identifier: ambulation  Goal Description: pt will report pain-free ambulation for 3 days in a row  Rationale: to maximize safety and independence with performance of ADLs and functional tasks;to maximize safety and independence within the home;to maximize safety and independence within the community  Target Date: 01/08/25      Frequency of Treatment: 1x/EOW  Duration of Treatment: 12  weeks    Recommended Referrals to Other Professionals:   Education Assessment:   Learner/Method: Patient;No Barriers to Learning  Education Comments: educated on diagnosis, prognosis, expectations of therapy, and importance of movement    Risks and benefits of evaluation/treatment have been explained.   Patient/Family/caregiver agrees with Plan of Care.     Evaluation Time:     PT Eval, Low Complexity Minutes (80724): 15       Signing Clinician: FLORENCIO PANG [facility]                                                                                   OUTPATIENT PHYSICAL THERAPY      PLAN OF TREATMENT FOR OUTPATIENT REHABILITATION   Patient's Last Name, First Name, Shiela Cesar YOB: 1955   Provider's Name   [facility]   Medical Record No.  [id number]     Onset Date: 09/23/24 (order date)  Start of Care Date: 11/13/24     Medical Diagnosis:  R foot pain      PT Treatment Diagnosis:  R foot pain Plan of Treatment  Frequency/Duration: 1x/EOW/ 12 weeks    Certification date from 11/13/24 to 02/05/25         See note for plan of treatment details and functional goals     CHERELLE PARNELL PT                         I CERTIFY THE NEED FOR THESE SERVICES FURNISHED UNDER        THIS PLAN OF TREATMENT AND WHILE UNDER MY CARE     (Physician attestation of this document indicates review and certification of the therapy plan).              Referring Provider:  Amelei Rodgers    Initial Assessment  See Epic Evaluation- Start of Care Date: 11/13/24

## 2024-11-13 NOTE — PATIENT INSTRUCTIONS
Dear Shiela Hewitt    After reviewing your responses, I've been able to diagnose you with a urinary tract infection, which is a common infection of the bladder with bacteria.  This is not a sexually transmitted infection, though urinating immediately after intercourse can help prevent infections.  Drinking lots of fluids is also helpful to clear your current infection and prevent the next one.      I have sent a prescription for antibiotics to your pharmacy to treat this infection.    It is important that you take all of your prescribed medication even if your symptoms are improving after a few doses.  Taking all of your medicine helps prevent the symptoms from returning.     If your symptoms worsen, you develop pain in your back or stomach, develop fevers, or are not improving in 5 days, please contact your primary care provider for an appointment or visit any of our convenient Walk-in or Urgent Care Centers to be seen, which can be found on our website here.    Thanks again for choosing us as your health care partner,    JENNIFER Oliveira CNP

## 2024-11-20 ENCOUNTER — THERAPY VISIT (OUTPATIENT)
Dept: PHYSICAL THERAPY | Facility: CLINIC | Age: 69
End: 2024-11-20
Payer: MEDICARE

## 2024-11-20 DIAGNOSIS — M79.671 RIGHT FOOT PAIN: ICD-10-CM

## 2024-11-20 DIAGNOSIS — M72.2 PLANTAR FASCIITIS: ICD-10-CM

## 2024-11-20 DIAGNOSIS — S82.64XS CLOSED NONDISPLACED FRACTURE OF LATERAL MALLEOLUS OF RIGHT FIBULA, SEQUELA: Primary | ICD-10-CM

## 2024-11-27 ENCOUNTER — THERAPY VISIT (OUTPATIENT)
Dept: PHYSICAL THERAPY | Facility: CLINIC | Age: 69
End: 2024-11-27
Payer: MEDICARE

## 2024-11-27 DIAGNOSIS — S82.64XS CLOSED NONDISPLACED FRACTURE OF LATERAL MALLEOLUS OF RIGHT FIBULA, SEQUELA: Primary | ICD-10-CM

## 2024-11-27 DIAGNOSIS — M72.2 PLANTAR FASCIITIS: ICD-10-CM

## 2024-11-27 DIAGNOSIS — M79.671 RIGHT FOOT PAIN: ICD-10-CM

## 2024-11-27 PROCEDURE — 97110 THERAPEUTIC EXERCISES: CPT | Mod: GP

## 2024-11-27 PROCEDURE — 97140 MANUAL THERAPY 1/> REGIONS: CPT | Mod: GP

## 2024-12-09 DIAGNOSIS — E03.9 HYPOTHYROIDISM, UNSPECIFIED TYPE: ICD-10-CM

## 2024-12-09 RX ORDER — LEVOTHYROXINE SODIUM 75 UG/1
75 TABLET ORAL DAILY
Qty: 90 TABLET | Refills: 2 | Status: SHIPPED | OUTPATIENT
Start: 2024-12-09

## 2024-12-09 NOTE — PROGRESS NOTES
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   -  Background  In brief, she noted an abdominal mass at the end of 2013, which was quite large and was resected on 01/28/2014. This tumor was about 26 cm in greatest diameter, high-grade, with necrosis present. In retrospect, she had had a CT scan done about 2 years before that, which was felt to be negative. In 08/2014, repeat imaging revealed a 3.5 cm mass near the psoas, and she received preoperative radiation therapy for this. 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 A new margin was obtained, but this was still positive for liposarcoma. All visible tumor was removed.      A recurrence was noted in the abd and she began lipodox/ifos about 3-19-15 and had 6 cycles; the last in August 2015.  -  She was admitted for partial SBO earlier in 2018.  -  She had a normal stress echo 8-31-21. She also had a normal CT angio.  -        Interval history    Plantar fasciitis is bothering her R foot since about 2 months.  She is doing PT exercises.  She broke her ankle in Feb and does not walk as much due to that-happened in [place]    She is doing pretty well and is active; goes three times per week to gym.      They go to [place] late Dec till May.     She had another mild SBO last month not requiring going to a hospital.     She again had a red face and neck the next morning after the last CT-lasted most of the day.  She got 1 hive with gadolinium with the brain MRI.    More GERD; not taking prilosec regularly but uses Tums w/week.    Normal diet.  She does have the diarrhea.  Still ~7 BMs/d.. She does have chronic diarrhea that depends on what she eats; still an issue. She has a history of gi issues; no  "imodium now but might consider trying it again-\"scared to use re SBO\"; shes concerned about getting another SBO.   She had some colitis episodes in the past.    She does still have the neuropathy bothers her feet. Right now she is taking gabapentin at bedtime.-was not able to go off it re foot burning.  .      She had a cystoscopy in Dec 2022 for UTIs. She has them frequently.  She saw urology who felt this might be due to the previous radiation.     Vertigo is OK now.  In 2018 she had several episodes of vertigo and developed noise in her R ear requiring hospitalization.  She has hearing aids.     She is taking B12 gummies as that was \"a little low\" in [place].     Mood \"off and on\" - her mom and sister  a couple years ago.   Does not want to see anyone on that now but might think about it next year..      She has a longstanding history of hypothyroidism on replacement T4, and some seasonal allergies. She was also noted to have an antiphospholipid antibody. This was checked because her sister presented with a mesenteric artery clot.       -  Background PMH, FH, SH  PAST MEDICAL HISTORY: She did have a tonsillectomy at age 15 and some sinus surgery in about  along with a tubal pregnancy and a tubal ligation.   ALLERGIES: She does describe 1 hive after IV contrast material about 20 years ago, so she has been taking prednisone and Benadryl for contrast scans. She does have some itching with Dilaudid, but still uses it.   SOCIAL HISTORY: She is a never smoker and does not abuse alcohol or other drugs. 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  -          On exam she appeared comfortable with normal affect.   HEENT full EOMs  NECK no obvious goiter or mass  CHEST no resp distress  NEURO  normal mentation and speech   PSYCH Mood normal     -  On 23 CBC, LFT, GNE OK except creat 1.02, , Hb 14.2     On 24 creat 1.12, alb 4.7, LFT OK, glu 163, CBC OK " though  - not a true FBS     -   The CT images of 6-7-24 showed no clear recurrence ; the potential inflammation of the distal small bowel seemed slightly improved     Formal read:  CT-CAP 6-7-24  IMPRESSION:  1.  No evidence of new or worsening metastatic disease in the chest, abdomen and pelvis.  2.  Improved inflammatory changes along the distal small bowel. Mild wall thickening is noted along the sigmoid colon which is suspected to be due to underdistention but could also suggest mild colitis. No significant surrounding inflammatory changes are   present. Recommend correlation with clinical exam.  3.  Stable pulmonary nodules measuring up to 6 mm in the right lower lobe     -  We discussed a number of issues.      -sarcoma. abd dedifferentiated liposarcomq.  CHEIKH now.      She is now almost 9 years out from last chemotherapy (August 2015) for recurrent sarcoma.     We will tentatively restage 6-6 and see her 6-10.    -plantar fasciitis improving       -small bowel inflammation on CT  This seems better; perhaps XRT effect       Contrast allergy requires methylpred; She has steroid Rx for the next scan and takes that.     -intermittent hematuria/UTI  Had cystoscopy in Dec 2022 and follows w urology.       We discussed pushing po before and after CTs     -neuropathy  She will use gabapentin as needed     -GERD  She will use prilosec prn now     -Diarrhea  Stable, f/u w PCP     -hypothyroidisim, hearing loss  Stable f/u w PCP     All questions were addressed and she will call if other questions arise.    Rob Wiseman M.D.  Professor  Hematology, Oncology and Transplantation

## 2024-12-10 ENCOUNTER — VIRTUAL VISIT (OUTPATIENT)
Dept: ONCOLOGY | Facility: CLINIC | Age: 69
End: 2024-12-10
Attending: INTERNAL MEDICINE
Payer: MEDICARE

## 2024-12-10 VITALS — WEIGHT: 154 LBS | BODY MASS INDEX: 22.81 KG/M2 | HEIGHT: 69 IN

## 2024-12-10 DIAGNOSIS — Z91.041 CONTRAST MEDIA ALLERGY: ICD-10-CM

## 2024-12-10 DIAGNOSIS — E03.4 HYPOTHYROIDISM DUE TO ACQUIRED ATROPHY OF THYROID: ICD-10-CM

## 2024-12-10 DIAGNOSIS — R79.89 ELEVATED SERUM CREATININE: ICD-10-CM

## 2024-12-10 DIAGNOSIS — K21.9 GASTROESOPHAGEAL REFLUX DISEASE WITHOUT ESOPHAGITIS: ICD-10-CM

## 2024-12-10 DIAGNOSIS — R19.7 DIARRHEA, UNSPECIFIED TYPE: ICD-10-CM

## 2024-12-10 DIAGNOSIS — C49.9 SARCOMA OF SOFT TISSUE (H): Primary | ICD-10-CM

## 2024-12-10 PROCEDURE — 99214 OFFICE O/P EST MOD 30 MIN: CPT | Mod: 95 | Performed by: INTERNAL MEDICINE

## 2024-12-10 ASSESSMENT — PAIN SCALES - GENERAL: PAINLEVEL_OUTOF10: NO PAIN (0)

## 2024-12-10 NOTE — LETTER
12/10/2024      Shiela Hewitt  [address] [place] [address]      Dear Colleague,    Thank you for referring your patient, Shiela Hewitt, to the [facility]. Please see a copy of my visit note below.    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   -  Background  In brief, she noted an abdominal mass at the end of 2013, which was quite large and was resected on 01/28/2014. This tumor was about 26 cm in greatest diameter, high-grade, with necrosis present. In retrospect, she had had a CT scan done about 2 years before that, which was felt to be negative. In 08/2014, repeat imaging revealed a 3.5 cm mass near the psoas, and she received preoperative radiation therapy for this. 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 A new margin was obtained, but this was still positive for liposarcoma. All visible tumor was removed.      A recurrence was noted in the abd and she began lipodox/ifos about 3-19-15 and had 6 cycles; the last in August 2015.  -  She was admitted for partial SBO earlier in 2018.  -  She had a normal stress echo 8-31-21. She also had a normal CT angio.  -        Interval history    Plantar fasciitis is bothering her R foot since about 2 months.  She is doing PT exercises.  She broke her ankle in Feb and does not walk as much due to that-happened in [place]    She is doing pretty well and is active; goes three times per week to gym.      They go to [place] late Dec till May.     She had another mild SBO last month not requiring going to a hospital.     She again had a red face and neck the next morning after the last CT-lasted most of the day.  She got 1 hive with gadolinium with the brain  "MRI.    More GERD; not taking prilosec regularly but uses Tums w/week.    Normal diet.  She does have the diarrhea.  Still ~7 BMs/d.. She does have chronic diarrhea that depends on what she eats; still an issue. She has a history of gi issues; no imodium now but might consider trying it again-\"scared to use re SBO\"; shes concerned about getting another SBO.   She had some colitis episodes in the past.    She does still have the neuropathy bothers her feet. Right now she is taking gabapentin at bedtime.-was not able to go off it re foot burning.  .      She had a cystoscopy in Dec 2022 for UTIs. She has them frequently.  She saw urology who felt this might be due to the previous radiation.     Vertigo is OK now.  In 2018 she had several episodes of vertigo and developed noise in her R ear requiring hospitalization.  She has hearing aids.     She is taking B12 gummies as that was \"a little low\" in [place].     Mood \"off and on\" - her mom and sister  a couple years ago.   Does not want to see anyone on that now but might think about it next year..      She has a longstanding history of hypothyroidism on replacement T4, and some seasonal allergies. She was also noted to have an antiphospholipid antibody. This was checked because her sister presented with a mesenteric artery clot.       -  Background PMH, FH, SH  PAST MEDICAL HISTORY: She did have a tonsillectomy at age 15 and some sinus surgery in about  along with a tubal pregnancy and a tubal ligation.   ALLERGIES: She does describe 1 hive after IV contrast material about 20 years ago, so she has been taking prednisone and Benadryl for contrast scans. She does have some itching with Dilaudid, but still uses it.   SOCIAL HISTORY: She is a never smoker and does not abuse alcohol or other drugs. 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  -          On exam she appeared comfortable with normal affect.   HEENT full " EOMs  NECK no obvious goiter or mass  CHEST no resp distress  NEURO  normal mentation and speech   PSYCH Mood normal     -  On 12-1-23 CBC, LFT, GNE OK except creat 1.02, , Hb 14.2     On 6-7-24 creat 1.12, alb 4.7, LFT OK, glu 163, CBC OK though  - not a true FBS     -   The CT images of 6-7-24 showed no clear recurrence ; the potential inflammation of the distal small bowel seemed slightly improved     Formal read:  CT-CAP 6-7-24  IMPRESSION:  1.  No evidence of new or worsening metastatic disease in the chest, abdomen and pelvis.  2.  Improved inflammatory changes along the distal small bowel. Mild wall thickening is noted along the sigmoid colon which is suspected to be due to underdistention but could also suggest mild colitis. No significant surrounding inflammatory changes are   present. Recommend correlation with clinical exam.  3.  Stable pulmonary nodules measuring up to 6 mm in the right lower lobe     -  We discussed a number of issues.      -sarcoma. abd dedifferentiated liposarcomq.  CHEIKH now.      She is now almost 9 years out from last chemotherapy (August 2015) for recurrent sarcoma.     We will tentatively restage 6-6 and see her 6-10.    -plantar fasciitis improving       -small bowel inflammation on CT  This seems better; perhaps XRT effect       Contrast allergy requires methylpred; She has steroid Rx for the next scan and takes that.     -intermittent hematuria/UTI  Had cystoscopy in Dec 2022 and follows w urology.       We discussed pushing po before and after CTs     -neuropathy  She will use gabapentin as needed     -GERD  She will use prilosec prn now     -Diarrhea  Stable, f/u w PCP     -hypothyroidisim, hearing loss  Stable f/u w PCP     All questions were addressed and she will call if other questions arise.    Rob Wiseman M.D.  Professor  Hematology, Oncology and Transplantation      Again, thank you for allowing me to participate in the care of your patient.         Sincerely,        Rob Wiseman MD

## 2024-12-10 NOTE — NURSING NOTE
Current patient location: [address] [place] [address]   [facility] [address]    Is the patient currently in the state of [place]? YES    Visit mode:VIDEO    If the visit is dropped, the patient can be reconnected by:VIDEO VISIT: Text to cell phone:   Telephone Information:   Mobile [phone]       Will anyone else be joining the visit? NO  (If patient encounters technical issues they should call [phone])    Are changes needed to the allergy or medication list? Pt stated no changes to allergies and Pt stated no med changes    Are refills needed on medications prescribed by this physician? NO    Rooming Documentation:  Questionnaire(s) completed    Reason for visit: MAULIK CAMACHOF

## 2024-12-18 ENCOUNTER — THERAPY VISIT (OUTPATIENT)
Dept: PHYSICAL THERAPY | Facility: CLINIC | Age: 69
End: 2024-12-18
Payer: MEDICARE

## 2024-12-18 DIAGNOSIS — M79.671 RIGHT FOOT PAIN: ICD-10-CM

## 2024-12-18 DIAGNOSIS — S82.64XS CLOSED NONDISPLACED FRACTURE OF LATERAL MALLEOLUS OF RIGHT FIBULA, SEQUELA: Primary | ICD-10-CM

## 2024-12-18 DIAGNOSIS — M72.2 PLANTAR FASCIITIS: ICD-10-CM

## 2024-12-18 PROCEDURE — 97110 THERAPEUTIC EXERCISES: CPT | Mod: GP

## 2024-12-18 PROCEDURE — 97035 APP MDLTY 1+ULTRASOUND EA 15: CPT | Mod: GP

## 2024-12-18 PROCEDURE — 97140 MANUAL THERAPY 1/> REGIONS: CPT | Mod: GP

## 2025-03-23 ENCOUNTER — HEALTH MAINTENANCE LETTER (OUTPATIENT)
Age: 70
End: 2025-03-23

## 2025-06-06 ENCOUNTER — HOSPITAL ENCOUNTER (OUTPATIENT)
Dept: CT IMAGING | Facility: CLINIC | Age: 70
Discharge: HOME | End: 2025-06-06
Attending: INTERNAL MEDICINE
Payer: MEDICARE

## 2025-06-06 ENCOUNTER — LAB (OUTPATIENT)
Dept: LAB | Facility: CLINIC | Age: 70
End: 2025-06-06
Attending: INTERNAL MEDICINE
Payer: MEDICARE

## 2025-06-06 DIAGNOSIS — R79.89 ELEVATED SERUM CREATININE: ICD-10-CM

## 2025-06-06 DIAGNOSIS — Z91.041 CONTRAST MEDIA ALLERGY: ICD-10-CM

## 2025-06-06 DIAGNOSIS — R19.7 DIARRHEA, UNSPECIFIED TYPE: ICD-10-CM

## 2025-06-06 DIAGNOSIS — C49.9 SARCOMA OF SOFT TISSUE (H): ICD-10-CM

## 2025-06-06 DIAGNOSIS — K21.9 GASTROESOPHAGEAL REFLUX DISEASE WITHOUT ESOPHAGITIS: ICD-10-CM

## 2025-06-06 DIAGNOSIS — E03.4 HYPOTHYROIDISM DUE TO ACQUIRED ATROPHY OF THYROID: ICD-10-CM

## 2025-06-06 DIAGNOSIS — R73.09 ELEVATED GLUCOSE: ICD-10-CM

## 2025-06-06 DIAGNOSIS — G62.9 NEUROPATHY: ICD-10-CM

## 2025-06-06 LAB
ALBUMIN SERPL BCG-MCNC: 4.5 G/DL (ref 3.5–5.2)
ALP SERPL-CCNC: 74 U/L (ref 40–150)
ALT SERPL W P-5'-P-CCNC: 22 U/L (ref 0–50)
ANION GAP SERPL CALCULATED.3IONS-SCNC: 14 MMOL/L (ref 7–15)
AST SERPL W P-5'-P-CCNC: 32 U/L (ref 0–45)
BASOPHILS # BLD AUTO: 0 10E3/UL (ref 0–0.2)
BASOPHILS NFR BLD AUTO: 0 %
BILIRUB SERPL-MCNC: 0.6 MG/DL
BUN SERPL-MCNC: 20.6 MG/DL (ref 8–23)
CALCIUM SERPL-MCNC: 9.9 MG/DL (ref 8.8–10.4)
CHLORIDE SERPL-SCNC: 105 MMOL/L (ref 98–107)
CREAT SERPL-MCNC: 1.11 MG/DL (ref 0.51–0.95)
EGFRCR SERPLBLD CKD-EPI 2021: 54 ML/MIN/1.73M2
EOSINOPHIL # BLD AUTO: 0 10E3/UL (ref 0–0.7)
EOSINOPHIL NFR BLD AUTO: 0 %
ERYTHROCYTE [DISTWIDTH] IN BLOOD BY AUTOMATED COUNT: 13.3 % (ref 10–15)
GLUCOSE SERPL-MCNC: 137 MG/DL (ref 70–99)
HCO3 SERPL-SCNC: 22 MMOL/L (ref 22–29)
HCT VFR BLD AUTO: 41.8 % (ref 35–47)
HGB BLD-MCNC: 14.1 G/DL (ref 11.7–15.7)
IMM GRANULOCYTES # BLD: 0 10E3/UL
IMM GRANULOCYTES NFR BLD: 0 %
LYMPHOCYTES # BLD AUTO: 1.2 10E3/UL (ref 0.8–5.3)
LYMPHOCYTES NFR BLD AUTO: 14 %
MCH RBC QN AUTO: 33.4 PG (ref 26.5–33)
MCHC RBC AUTO-ENTMCNC: 33.7 G/DL (ref 31.5–36.5)
MCV RBC AUTO: 99 FL (ref 78–100)
MONOCYTES # BLD AUTO: 0.1 10E3/UL (ref 0–1.3)
MONOCYTES NFR BLD AUTO: 1 %
NEUTROPHILS # BLD AUTO: 7.2 10E3/UL (ref 1.6–8.3)
NEUTROPHILS NFR BLD AUTO: 85 %
NRBC # BLD AUTO: 0 10E3/UL
NRBC BLD AUTO-RTO: 0 /100
PLATELET # BLD AUTO: 217 10E3/UL (ref 150–450)
POTASSIUM SERPL-SCNC: 4.1 MMOL/L (ref 3.4–5.3)
PROT SERPL-MCNC: 7.3 G/DL (ref 6.4–8.3)
RBC # BLD AUTO: 4.22 10E6/UL (ref 3.8–5.2)
SODIUM SERPL-SCNC: 141 MMOL/L (ref 135–145)
WBC # BLD AUTO: 8.5 10E3/UL (ref 4–11)

## 2025-06-06 PROCEDURE — 74177 CT ABD & PELVIS W/CONTRAST: CPT | Mod: 26 | Performed by: RADIOLOGY

## 2025-06-06 PROCEDURE — 250N000011 HC RX IP 250 OP 636: Performed by: INTERNAL MEDICINE

## 2025-06-06 PROCEDURE — 84132 ASSAY OF SERUM POTASSIUM: CPT

## 2025-06-06 PROCEDURE — 85025 COMPLETE CBC W/AUTO DIFF WBC: CPT

## 2025-06-06 PROCEDURE — 74177 CT ABD & PELVIS W/CONTRAST: CPT

## 2025-06-06 PROCEDURE — 71260 CT THORAX DX C+: CPT | Mod: 26 | Performed by: RADIOLOGY

## 2025-06-06 PROCEDURE — 36415 COLL VENOUS BLD VENIPUNCTURE: CPT

## 2025-06-06 RX ORDER — IOPAMIDOL 755 MG/ML
95 INJECTION, SOLUTION INTRAVASCULAR ONCE
Status: COMPLETED | OUTPATIENT
Start: 2025-06-06 | End: 2025-06-06

## 2025-06-06 RX ADMIN — IOPAMIDOL 95 ML: 755 INJECTION, SOLUTION INTRAVENOUS at 10:05

## 2025-06-09 NOTE — PROGRESS NOTES
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   -  Background  In brief, she noted an abdominal mass at the end of 2013, which was quite large and was resected on 01/28/2014. This tumor was about 26 cm in greatest diameter, high-grade, with necrosis present. In retrospect, she had had a CT scan done about 2 years before that, which was felt to be negative. In 08/2014, repeat imaging revealed a 3.5 cm mass near the psoas, and she received preoperative radiation therapy for this. 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 A new margin was obtained, but this was still positive for liposarcoma. All visible tumor was removed.      A recurrence was noted in the abd and she began lipodox/ifos about 3-19-15 and had 6 cycles; the last in August 2015.  -  She was admitted for partial SBO earlier in 2018.  -  She had a normal stress echo 8-31-21. She also had a normal CT angio.  -        Interval history     She is doing well overall and not much is bothering her.    Plantar fasciitis is better-\"pretty much gone.\"       She is doing pretty well and is active golfing; goes three times per week to gym.  No particular limitations to activity.    No SBOs since her last visit.  She finds fresh vegetables seems to increase SBO risk.     She again had a red face and neck the next morning after the last CT-lasted most of the day-no itching.  She got 1 hive with gadolinium with the last brain MRI.    Not that much GERD if she avoids acid foods; not taking prilosec regularly (has not used for some time) but uses Tums less than 2/month.     Normal diet.  She does have the diarrhea but \"better.\"  Still ~7-8 BMs/d. She does have chronic diarrhea that depends on what she eats; still an issue. She has a history of gi issues; " "no imodium now but might consider trying it again-\"scared to use re SBO\"; shes concerned about getting another SBO.   She had some colitis episodes in the past.    She does still have the neuropathy bothers her feet-unchanged. Right now she is taking gabapentin at bedtime.-was not able to go off it re foot burning.   She has some sleeping difficulty due to the feet.    She had a cystoscopy in Dec 2022 for UTIs. She had them frequently.  She saw urology who felt this might be due to the previous radiation.  She has not had any UTIs lately-maybe 6 mo ago.     Vertigo is OK now.  In June 2018 she had several episodes of vertigo and developed noise in her R ear requiring hospitalization.  She has hearing aids.    She will get A1c in August w PCP.    Mood \"good.\"      [place] go to [place] late [place] till [place] (also Oct-Nov).    No longer taking B12.    She has a longstanding history of hypothyroidism on replacement T4, and some seasonal allergies. She was also noted to have an antiphospholipid antibody. This was checked because her sister presented with a mesenteric artery clot.        -  Background PMH, FH, SH  PAST MEDICAL HISTORY: She did have a tonsillectomy at age 15 and some sinus surgery in about 1994 along with a tubal pregnancy and a tubal ligation.   ALLERGIES: She does describe 1 hive after IV contrast material about 20 years ago, so she has been taking prednisone and Benadryl for contrast scans. She does have some itching with Dilaudid, but still uses it.   SOCIAL HISTORY: She is a never smoker and does not abuse alcohol or other drugs. 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  -          On exam she appeared comfortable with normal affect.   HEENT full EOMs  NECK no obvious goiter or mass  CHEST no resp distress  NEURO  normal mentation and speech   PSYCH Mood normal     -  On 12-1-23 CBC, LFT, GNE OK except creat 1.02, , Hb 14.2     On 6-7-24 creat 1.12, alb " 4.7, LFT OK, glu 163, CBC OK though  - not a true FBS     On 6-6-25 creat 1.11, LFT OK, alb 4.5, CBC OK    -   The CT images of 6-7-24 showed no clear recurrence ; the potential inflammation of the distal small bowel seemed slightly improved     Formal read:  CT-CAP 6-7-24  IMPRESSION:  1.  No evidence of new or worsening metastatic disease in the chest, abdomen and pelvis.  2.  Improved inflammatory changes along the distal small bowel. Mild wall thickening is noted along the sigmoid colon which is suspected to be due to underdistention but could also suggest mild colitis. No significant surrounding inflammatory changes are   present. Recommend correlation with clinical exam.  3.  Stable pulmonary nodules measuring up to 6 mm in the right lower lobe    I reviewed the new images of 6-6-25 and there is no recurrence.    Formal read:  CT-CAP 6-6-25  IMPRESSION:  1. No definite evidence of tumor recurrence.  2. Stable subpleural nodule in the right lower lobe measuring 5.6 mm.    -  We discussed a number of issues.     -sarcoma. abd dedifferentiated liposarcomq.  CHEIKH now.      She is now almost 10 years out from last chemotherapy (August 2015) for recurrent sarcoma.    We discussed when to stop follow up and will do so after next year; we will tentatively restage after 6-10 and see her a few days after that.  She will follow w PCP re glu.       -plantar fasciitis resolved    -small bowel inflammation on CT  This seems better; perhaps XRT effect        Contrast allergy requires methylpred; She has steroid Rx for the next scan and takes that.     -intermittent hematuria/UTI  Had cystoscopy in Dec 2022 and follows w urology.     We discussed pushing po before and after CTs     -neuropathy  She will use gabapentin as needed     -GERD  controlled     -Diarrhea  Stable, f/u w PCP     -hypothyroidisim, hearing loss  Stable f/u w PCP     All questions were addressed and she will call if other questions arise.    Rob  BRIANNE Wiseman M.D.  Professor  Hematology, Oncology and Transplantation

## 2025-06-10 ENCOUNTER — VIRTUAL VISIT (OUTPATIENT)
Dept: ONCOLOGY | Facility: CLINIC | Age: 70
End: 2025-06-10
Attending: INTERNAL MEDICINE
Payer: MEDICARE

## 2025-06-10 VITALS — WEIGHT: 155 LBS | HEIGHT: 69 IN | BODY MASS INDEX: 22.96 KG/M2

## 2025-06-10 DIAGNOSIS — C49.9 SARCOMA OF SOFT TISSUE (H): Primary | ICD-10-CM

## 2025-06-10 DIAGNOSIS — Z91.041 CONTRAST MEDIA ALLERGY: ICD-10-CM

## 2025-06-10 DIAGNOSIS — R73.09 ELEVATED GLUCOSE: ICD-10-CM

## 2025-06-10 DIAGNOSIS — R19.7 DIARRHEA, UNSPECIFIED TYPE: ICD-10-CM

## 2025-06-10 DIAGNOSIS — K21.9 GASTROESOPHAGEAL REFLUX DISEASE WITHOUT ESOPHAGITIS: ICD-10-CM

## 2025-06-10 DIAGNOSIS — G62.9 NEUROPATHY: ICD-10-CM

## 2025-06-10 ASSESSMENT — PAIN SCALES - GENERAL: PAINLEVEL_OUTOF10: NO PAIN (0)

## 2025-06-10 NOTE — NURSING NOTE
Current patient location: [address] [place] [address]   [facility] [id number]    Is the patient currently in the state of [place]? YES    Visit mode: VIDEO    If the visit is dropped, the patient can be reconnected by:VIDEO VISIT: Text to cell phone:   Telephone Information:   Mobile [phone]       Will anyone else be joining the visit? NO  (If patient encounters technical issues they should call [phone])    Are changes needed to the allergy or medication list? Pt stated no changes to allergies and Pt stated no med changes    Are refills needed on medications prescribed by this physician? NO    Rooming Documentation:  Questionnaire(s) completed    Reason for visit: MAULIK MEHTA

## 2025-06-10 NOTE — LETTER
"6/10/2025      Shiela Hewitt  [address] [place] [address]      Dear Colleague,    Thank you for referring your patient, Shiela Hewitt, to the [facility]. Please see a copy of my visit note below.        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   -  Background  In brief, she noted an abdominal mass at the end of 2013, which was quite large and was resected on 01/28/2014. This tumor was about 26 cm in greatest diameter, high-grade, with necrosis present. In retrospect, she had had a CT scan done about 2 years before that, which was felt to be negative. In 08/2014, repeat imaging revealed a 3.5 cm mass near the psoas, and she received preoperative radiation therapy for this. 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 A new margin was obtained, but this was still positive for liposarcoma. All visible tumor was removed.      A recurrence was noted in the abd and she began lipodox/ifos about 3-19-15 and had 6 cycles; the last in August 2015.  -  She was admitted for partial SBO earlier in 2018.  -  She had a normal stress echo 8-31-21. She also had a normal CT angio.  -        Interval history     She is doing well overall and not much is bothering her.    Plantar fasciitis is better-\"pretty much gone.\"       She is doing pretty well and is active golfing; goes three times per week to gym.  No particular limitations to activity.    No SBOs since her last visit.  She finds fresh vegetables seems to increase SBO risk.     She again had a red face and neck the next morning after the last CT-lasted most of the day-no itching.  She got 1 hive with gadolinium with the last brain MRI.    Not that much GERD if she avoids acid foods; not taking prilosec regularly (has " "not used for some time) but uses Tums less than 2/month.     Normal diet.  She does have the diarrhea but \"better.\"  Still ~7-8 BMs/d. She does have chronic diarrhea that depends on what she eats; still an issue. She has a history of gi issues; no imodium now but might consider trying it again-\"scared to use re SBO\"; shes concerned about getting another SBO.   She had some colitis episodes in the past.    She does still have the neuropathy bothers her feet-unchanged. Right now she is taking gabapentin at bedtime.-was not able to go off it re foot burning.   She has some sleeping difficulty due to the feet.    She had a cystoscopy in Dec 2022 for UTIs. She had them frequently.  She saw urology who felt this might be due to the previous radiation.  She has not had any UTIs lately-maybe 6 mo ago.     Vertigo is OK now.  In June 2018 she had several episodes of vertigo and developed noise in her R ear requiring hospitalization.  She has hearing aids.    She will get A1c in August w PCP.    Mood \"good.\"      [place] go to [place] late [place] till [place] (also Oct-Nov).    No longer taking B12.    She has a longstanding history of hypothyroidism on replacement T4, and some seasonal allergies. She was also noted to have an antiphospholipid antibody. This was checked because her sister presented with a mesenteric artery clot.        -  Background PMH, FH, SH  PAST MEDICAL HISTORY: She did have a tonsillectomy at age 15 and some sinus surgery in about 1994 along with a tubal pregnancy and a tubal ligation.   ALLERGIES: She does describe 1 hive after IV contrast material about 20 years ago, so she has been taking prednisone and Benadryl for contrast scans. She does have some itching with Dilaudid, but still uses it.   SOCIAL HISTORY: She is a never smoker and does not abuse alcohol or other drugs. 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  -          On exam she appeared comfortable with " normal affect.   HEENT full EOMs  NECK no obvious goiter or mass  CHEST no resp distress  NEURO  normal mentation and speech   PSYCH Mood normal     -  On 12-1-23 CBC, LFT, GNE OK except creat 1.02, , Hb 14.2     On 6-7-24 creat 1.12, alb 4.7, LFT OK, glu 163, CBC OK though  - not a true FBS     On 6-6-25 creat 1.11, LFT OK, alb 4.5, CBC OK    -   The CT images of 6-7-24 showed no clear recurrence ; the potential inflammation of the distal small bowel seemed slightly improved     Formal read:  CT-CAP 6-7-24  IMPRESSION:  1.  No evidence of new or worsening metastatic disease in the chest, abdomen and pelvis.  2.  Improved inflammatory changes along the distal small bowel. Mild wall thickening is noted along the sigmoid colon which is suspected to be due to underdistention but could also suggest mild colitis. No significant surrounding inflammatory changes are   present. Recommend correlation with clinical exam.  3.  Stable pulmonary nodules measuring up to 6 mm in the right lower lobe    I reviewed the new images of 6-6-25 and there is no recurrence.    Formal read:  CT-CAP 6-6-25  IMPRESSION:  1. No definite evidence of tumor recurrence.  2. Stable subpleural nodule in the right lower lobe measuring 5.6 mm.    -  We discussed a number of issues.     -sarcoma. abd dedifferentiated liposarcomq.  CHEIKH now.      She is now almost 10 years out from last chemotherapy (August 2015) for recurrent sarcoma.    We discussed when to stop follow up and will do so after next year; we will tentatively restage after 6-10 and see her a few days after that.  She will follow w PCP re glu.       -plantar fasciitis resolved    -small bowel inflammation on CT  This seems better; perhaps XRT effect        Contrast allergy requires methylpred; She has steroid Rx for the next scan and takes that.     -intermittent hematuria/UTI  Had cystoscopy in Dec 2022 and follows w urology.     We discussed pushing po before and after  CTs     -neuropathy  She will use gabapentin as needed     -GERD  controlled     -Diarrhea  Stable, f/u w PCP     -hypothyroidisim, hearing loss  Stable f/u w PCP     All questions were addressed and she will call if other questions arise.    Rob Wiseman M.D.  Professor  Hematology, Oncology and Transplantation      Again, thank you for allowing me to participate in the care of your patient.        Sincerely,        Rob Wiseman MD    Electronically signed

## 2025-08-10 ENCOUNTER — PATIENT OUTREACH (OUTPATIENT)
Dept: ONCOLOGY | Facility: CLINIC | Age: 70
End: 2025-08-10
Payer: MEDICARE

## 2025-08-29 ENCOUNTER — ANCILLARY PROCEDURE (OUTPATIENT)
Dept: MAMMOGRAPHY | Facility: CLINIC | Age: 70
End: 2025-08-29
Attending: FAMILY MEDICINE
Payer: MEDICARE

## 2025-08-29 ENCOUNTER — ANCILLARY PROCEDURE (OUTPATIENT)
Dept: BONE DENSITY | Facility: CLINIC | Age: 70
End: 2025-08-29
Attending: FAMILY MEDICINE
Payer: MEDICARE

## 2025-08-29 DIAGNOSIS — Z12.31 VISIT FOR SCREENING MAMMOGRAM: ICD-10-CM

## 2025-08-29 PROCEDURE — 77067 SCR MAMMO BI INCL CAD: CPT | Mod: TC | Performed by: STUDENT IN AN ORGANIZED HEALTH CARE EDUCATION/TRAINING PROGRAM

## 2025-08-29 PROCEDURE — 77080 DXA BONE DENSITY AXIAL: CPT | Mod: TC | Performed by: PHYSICIAN ASSISTANT

## 2025-08-29 PROCEDURE — 77063 BREAST TOMOSYNTHESIS BI: CPT | Mod: TC | Performed by: STUDENT IN AN ORGANIZED HEALTH CARE EDUCATION/TRAINING PROGRAM

## (undated) RX ORDER — LIDOCAINE HYDROCHLORIDE 10 MG/ML
INJECTION, SOLUTION EPIDURAL; INFILTRATION; INTRACAUDAL; PERINEURAL
Status: DISPENSED
Start: 2022-06-29

## (undated) RX ORDER — SODIUM CHLORIDE 9 MG/ML
INJECTION, SOLUTION INTRAVENOUS
Status: DISPENSED
Start: 2022-06-29

## (undated) RX ORDER — HEPARIN SODIUM (PORCINE) LOCK FLUSH IV SOLN 100 UNIT/ML 100 UNIT/ML
SOLUTION INTRAVENOUS
Status: DISPENSED
Start: 2022-06-10

## (undated) RX ORDER — HEPARIN SODIUM (PORCINE) LOCK FLUSH IV SOLN 100 UNIT/ML 100 UNIT/ML
SOLUTION INTRAVENOUS
Status: DISPENSED
Start: 2018-11-27

## (undated) RX ORDER — HEPARIN SODIUM (PORCINE) LOCK FLUSH IV SOLN 100 UNIT/ML 100 UNIT/ML
SOLUTION INTRAVENOUS
Status: DISPENSED
Start: 2019-05-24

## (undated) RX ORDER — HEPARIN SODIUM (PORCINE) LOCK FLUSH IV SOLN 100 UNIT/ML 100 UNIT/ML
SOLUTION INTRAVENOUS
Status: DISPENSED
Start: 2019-12-03

## (undated) RX ORDER — LIDOCAINE HYDROCHLORIDE 20 MG/ML
JELLY TOPICAL
Status: DISPENSED
Start: 2022-12-23

## (undated) RX ORDER — HEPARIN SODIUM (PORCINE) LOCK FLUSH IV SOLN 100 UNIT/ML 100 UNIT/ML
SOLUTION INTRAVENOUS
Status: DISPENSED
Start: 2020-11-23

## (undated) RX ORDER — HEPARIN SODIUM (PORCINE) LOCK FLUSH IV SOLN 100 UNIT/ML 100 UNIT/ML
SOLUTION INTRAVENOUS
Status: DISPENSED
Start: 2021-05-21

## (undated) RX ORDER — HEPARIN SODIUM (PORCINE) LOCK FLUSH IV SOLN 100 UNIT/ML 100 UNIT/ML
SOLUTION INTRAVENOUS
Status: DISPENSED
Start: 2021-11-22

## (undated) RX ORDER — HEPARIN SODIUM (PORCINE) LOCK FLUSH IV SOLN 100 UNIT/ML 100 UNIT/ML
SOLUTION INTRAVENOUS
Status: DISPENSED
Start: 2018-06-18

## (undated) RX ORDER — HEPARIN SODIUM (PORCINE) LOCK FLUSH IV SOLN 100 UNIT/ML 100 UNIT/ML
SOLUTION INTRAVENOUS
Status: DISPENSED
Start: 2018-02-20